# Patient Record
Sex: MALE | Race: WHITE | NOT HISPANIC OR LATINO | Employment: OTHER | URBAN - METROPOLITAN AREA
[De-identification: names, ages, dates, MRNs, and addresses within clinical notes are randomized per-mention and may not be internally consistent; named-entity substitution may affect disease eponyms.]

---

## 2017-06-26 RX ORDER — FLUTICASONE PROPIONATE 50 MCG
1 SPRAY, SUSPENSION (ML) NASAL 2 TIMES DAILY PRN
COMMUNITY
Start: 2016-12-28 | End: 2018-12-06 | Stop reason: SDUPTHER

## 2017-06-27 ENCOUNTER — OFFICE VISIT (OUTPATIENT)
Dept: FAMILY MEDICINE | Facility: CLINIC | Age: 57
End: 2017-06-27
Payer: OTHER GOVERNMENT

## 2017-06-27 VITALS
TEMPERATURE: 99 F | SYSTOLIC BLOOD PRESSURE: 128 MMHG | OXYGEN SATURATION: 99 % | WEIGHT: 174 LBS | HEART RATE: 54 BPM | BODY MASS INDEX: 29.71 KG/M2 | HEIGHT: 64 IN | DIASTOLIC BLOOD PRESSURE: 84 MMHG

## 2017-06-27 DIAGNOSIS — R05.9 COUGH: ICD-10-CM

## 2017-06-27 DIAGNOSIS — J06.9 UPPER RESPIRATORY TRACT INFECTION, UNSPECIFIED TYPE: Primary | ICD-10-CM

## 2017-06-27 PROBLEM — G47.33 OBSTRUCTIVE SLEEP APNEA SYNDROME: Status: ACTIVE | Noted: 2017-06-27

## 2017-06-27 PROBLEM — I10 BENIGN HYPERTENSION: Status: ACTIVE | Noted: 2017-06-27

## 2017-06-27 PROBLEM — R79.89 HIGH THYROID STIMULATING HORMONE (TSH) LEVEL: Status: ACTIVE | Noted: 2017-06-27

## 2017-06-27 PROBLEM — J30.89 PERENNIAL ALLERGIC RHINITIS WITH SEASONAL VARIATION: Status: ACTIVE | Noted: 2017-06-27

## 2017-06-27 PROBLEM — J30.2 PERENNIAL ALLERGIC RHINITIS WITH SEASONAL VARIATION: Status: ACTIVE | Noted: 2017-06-27

## 2017-06-27 PROBLEM — B35.1 ONYCHOMYCOSIS OF TOENAIL: Status: ACTIVE | Noted: 2017-06-27

## 2017-06-27 PROBLEM — Z13.89 ENCOUNTER FOR SCREENING FOR OTHER DISORDER: Status: ACTIVE | Noted: 2017-06-27

## 2017-06-27 PROBLEM — K21.9 GASTROESOPHAGEAL REFLUX DISEASE WITHOUT ESOPHAGITIS: Status: ACTIVE | Noted: 2017-06-27

## 2017-06-27 PROBLEM — Q39.4 TERMINAL ESOPHAGEAL WEB: Status: ACTIVE | Noted: 2017-06-27

## 2017-06-27 PROBLEM — E78.2 MULTIPLE-TYPE HYPERLIPIDEMIA: Status: ACTIVE | Noted: 2017-06-27

## 2017-06-27 PROCEDURE — 99204 OFFICE O/P NEW MOD 45 MIN: CPT | Mod: ,,, | Performed by: FAMILY MEDICINE

## 2017-06-27 RX ORDER — AZITHROMYCIN 250 MG/1
250 TABLET, FILM COATED ORAL DAILY
Qty: 6 TABLET | Refills: 0 | Status: SHIPPED | OUTPATIENT
Start: 2017-06-27 | End: 2017-07-02

## 2017-06-27 RX ORDER — AZITHROMYCIN 250 MG/1
500 TABLET, FILM COATED ORAL DAILY
Qty: 6 TABLET | Refills: 0 | Status: SHIPPED | OUTPATIENT
Start: 2017-06-27 | End: 2017-06-27 | Stop reason: SDUPTHER

## 2017-06-27 RX ORDER — AZITHROMYCIN 250 MG/1
250 TABLET, FILM COATED ORAL DAILY
Qty: 6 TABLET | Refills: 0 | Status: SHIPPED | OUTPATIENT
Start: 2017-06-27 | End: 2017-06-27 | Stop reason: SDUPTHER

## 2017-06-27 NOTE — PROGRESS NOTES
Subjective:       Patient ID:  Patrice Smith is a 57 y.o. male with multiple medical diagnoses as listed in the medical history and problem list that presents for Cough (gets worse as day goes on) and Nasal Congestion (chest congestion)  .      Chief Complaint: Cough (gets worse as day goes on) and Nasal Congestion (chest congestion)    Cough   This is a new problem. The current episode started 1 to 4 weeks ago. The problem has been gradually worsening. The problem occurs hourly. The cough is productive of sputum. Pertinent negatives include no chest pain, chills, ear pain, eye redness, fever, headaches, rash, sore throat, shortness of breath, weight loss or wheezing. Nothing aggravates the symptoms. He has tried nothing for the symptoms. There is no history of asthma, COPD or emphysema.     Review of Systems   Constitutional: Negative for activity change, appetite change, chills, fatigue, fever and weight loss.   HENT: Negative for congestion, ear discharge, ear pain, hearing loss and sore throat.    Eyes: Negative for discharge, redness and itching.   Respiratory: Negative for apnea, chest tightness, shortness of breath and wheezing.    Cardiovascular: Negative for chest pain, palpitations and leg swelling.   Gastrointestinal: Negative for abdominal distention and abdominal pain.   Endocrine: Negative for cold intolerance and polydipsia.   Genitourinary: Negative for dysuria.   Musculoskeletal: Negative for arthralgias and gait problem.   Skin: Negative for color change, pallor and rash.   Neurological: Negative for dizziness and headaches.   Hematological: Negative for adenopathy.   Psychiatric/Behavioral: Negative for agitation.       Past Medical History:   Diagnosis Date    Acid reflux     Hyperlipidemia     Hypertension      No past surgical history on file.  Family History   Problem Relation Age of Onset    Parkinsonism Mother     Hypertension Mother     Ulcers Father     Arthritis Father      Diabetes Father     Hypertension Brother     Cancer Brother     Diabetes Brother     Anesthesia problems Neg Hx     Clotting disorder Neg Hx      Social History     Social History    Marital status:      Spouse name: N/A    Number of children: N/A    Years of education: N/A     Occupational History    Warehouse Mgr.       Social History Main Topics    Smoking status: Never Smoker    Smokeless tobacco: Never Used    Alcohol use Yes      Comment: social    Drug use: No    Sexual activity: Not Asked     Other Topics Concern    None     Social History Narrative    None     Current Outpatient Prescriptions   Medication Sig Dispense Refill    CIALIS 20 mg Tab TAKE 1 TABLET DAILY AS NEEDED 30 tablet 2    esomeprazole (NEXIUM) 40 MG capsule Take 40 mg by mouth before breakfast.        fluticasone (FLONASE) 50 mcg/actuation nasal spray 1 spray by Nasal route 2 (two) times daily as needed.      LACTOBACILLUS ACIDOPHILUS (PROBIOTIC ORAL) Take 1 tablet by mouth every other day.      multivitamin capsule Take 1 capsule by mouth once daily.        valsartan (DIOVAN) 160 MG tablet Take 160 mg by mouth once daily.        azithromycin (Z-CONNER) 250 MG tablet Take 1 tablet (250 mg total) by mouth once daily. 6 tablet 0     No current facility-administered medications for this visit.      Review of patient's allergies indicates:  No Known Allergies  Objective:      Vitals:    06/27/17 0811   BP: 128/84   Pulse: (!) 54   Temp: 98.8 °F (37.1 °C)     Physical Exam   Constitutional: He appears well-developed and well-nourished.   HENT:   Head: Normocephalic.   Nose: Mucosal edema and rhinorrhea present.   Mouth/Throat: Posterior oropharyngeal erythema present.   Eyes: Pupils are equal, round, and reactive to light. Right eye exhibits no discharge. Left eye exhibits no discharge. No scleral icterus.   Neck: No thyromegaly present.   Cardiovascular: Normal rate and regular rhythm.  Exam reveals no gallop and no  friction rub.    No murmur heard.  Pulmonary/Chest: No respiratory distress. He has no wheezes. He has no rales. He exhibits no tenderness.   Abdominal: He exhibits no distension. There is no tenderness.   Musculoskeletal: He exhibits no tenderness.   Skin: No erythema.   Psychiatric: He has a normal mood and affect.       Assessment:       1. Upper respiratory tract infection, unspecified type    2. Cough        Plan:       Upper respiratory tract infection, unspecified type  -Cough  -     -     azithromycin (Z-CONNER) 250 MG tablet; Take 1 tablet (250 mg total) by mouth once daily.  Dispense: 6 tablet; Refill: 0                     X-Ray Chest PA And Lateral  The patient was advised to remain hydrated and take the following medications for symptomatic relief: 1) Altenative with Ibuprofen/Tylenol for myalgias/fever >100.4, 2) Tessalon Perles for throat irritation, 3) Flonase for rhinitis, and 4) Mucinex for chest congestion. Patient will return to clinic if symptoms worsen or persist for > 1 week.       Return in about 4 weeks (around 7/25/2017) for URI.      6/27/2017 Cynthia Erazo M.D.

## 2017-06-28 ENCOUNTER — TELEPHONE (OUTPATIENT)
Dept: FAMILY MEDICINE | Facility: CLINIC | Age: 57
End: 2017-06-28

## 2017-06-28 NOTE — TELEPHONE ENCOUNTER
----- Message from Cynthia Erazo MD sent at 6/28/2017  9:47 AM CDT -----  Please notify pt : x ray wnl.     Thanks . Cynthia ERAZO MD

## 2017-09-22 LAB
ALBUMIN SERPL-MCNC: 4.5 G/DL (ref 3.6–5.1)
ALBUMIN/GLOB SERPL: 2.3 (CALC) (ref 1–2.5)
ALP SERPL-CCNC: 40 U/L (ref 40–115)
ALT SERPL-CCNC: 38 U/L (ref 9–46)
AST SERPL-CCNC: 28 U/L (ref 10–35)
BILIRUB SERPL-MCNC: 0.8 MG/DL (ref 0.2–1.2)
BUN SERPL-MCNC: 22 MG/DL (ref 7–25)
BUN/CREAT SERPL: NORMAL (CALC) (ref 6–22)
CALCIUM SERPL-MCNC: 9.4 MG/DL (ref 8.6–10.3)
CHLORIDE SERPL-SCNC: 101 MMOL/L (ref 98–110)
CHOLEST SERPL-MCNC: 191 MG/DL
CHOLEST/HDLC SERPL: 3.6 (CALC)
CO2 SERPL-SCNC: 24 MMOL/L (ref 20–31)
CREAT SERPL-MCNC: 1.03 MG/DL (ref 0.7–1.33)
GFR SERPL CREATININE-BSD FRML MDRD: 80 ML/MIN/1.73M2
GLOBULIN SER CALC-MCNC: 2 G/DL (CALC) (ref 1.9–3.7)
GLUCOSE SERPL-MCNC: 99 MG/DL (ref 65–99)
HDLC SERPL-MCNC: 53 MG/DL
LDLC SERPL CALC-MCNC: 117 MG/DL (CALC)
NONHDLC SERPL-MCNC: 138 MG/DL (CALC)
POTASSIUM SERPL-SCNC: 4.5 MMOL/L (ref 3.5–5.3)
PROT SERPL-MCNC: 6.5 G/DL (ref 6.1–8.1)
PSA SERPL-MCNC: 1.2 NG/ML
SODIUM SERPL-SCNC: 136 MMOL/L (ref 135–146)
TRIGL SERPL-MCNC: 100 MG/DL
TSH SERPL-ACNC: 3.5 MIU/L (ref 0.4–4.5)

## 2017-09-25 ENCOUNTER — OFFICE VISIT (OUTPATIENT)
Dept: FAMILY MEDICINE | Facility: CLINIC | Age: 57
End: 2017-09-25
Payer: OTHER GOVERNMENT

## 2017-09-25 VITALS
OXYGEN SATURATION: 98 % | SYSTOLIC BLOOD PRESSURE: 144 MMHG | DIASTOLIC BLOOD PRESSURE: 80 MMHG | HEIGHT: 64 IN | BODY MASS INDEX: 29.42 KG/M2 | WEIGHT: 172.31 LBS | HEART RATE: 56 BPM | RESPIRATION RATE: 16 BRPM | TEMPERATURE: 98 F

## 2017-09-25 DIAGNOSIS — I10 BENIGN HYPERTENSION: Primary | ICD-10-CM

## 2017-09-25 DIAGNOSIS — K21.9 GASTROESOPHAGEAL REFLUX DISEASE WITHOUT ESOPHAGITIS: ICD-10-CM

## 2017-09-25 PROCEDURE — 99213 OFFICE O/P EST LOW 20 MIN: CPT | Mod: 25,,, | Performed by: NURSE PRACTITIONER

## 2017-09-25 PROCEDURE — 90686 IIV4 VACC NO PRSV 0.5 ML IM: CPT | Mod: ,,, | Performed by: NURSE PRACTITIONER

## 2017-09-25 PROCEDURE — 3008F BODY MASS INDEX DOCD: CPT | Mod: ,,, | Performed by: NURSE PRACTITIONER

## 2017-09-25 PROCEDURE — 90471 IMMUNIZATION ADMIN: CPT | Mod: ,,, | Performed by: NURSE PRACTITIONER

## 2017-09-25 PROCEDURE — 3077F SYST BP >= 140 MM HG: CPT | Mod: ,,, | Performed by: NURSE PRACTITIONER

## 2017-09-25 PROCEDURE — 3079F DIAST BP 80-89 MM HG: CPT | Mod: ,,, | Performed by: NURSE PRACTITIONER

## 2017-09-25 RX ORDER — VALSARTAN 160 MG/1
160 TABLET ORAL DAILY
Qty: 90 TABLET | Refills: 3 | Status: SHIPPED | OUTPATIENT
Start: 2017-09-25 | End: 2018-03-26 | Stop reason: SDUPTHER

## 2017-09-25 RX ORDER — ESOMEPRAZOLE MAGNESIUM 40 MG/1
40 CAPSULE, DELAYED RELEASE ORAL
Qty: 90 CAPSULE | Refills: 3 | Status: SHIPPED | OUTPATIENT
Start: 2017-09-25 | End: 2018-03-26 | Stop reason: CLARIF

## 2017-09-25 NOTE — PROGRESS NOTES
Subjective:       Patient ID: Patrice Smith is a 57 y.o. male.    Chief Complaint: Follow-up and Hypertension    The patient is a 57 year old male who presents for a Recheck of Hypertension. The onset of the hypertension has been gradual. The hypertension has been occurring in a continuous pattern for years. The course has been constant. There is no chest pain, dyspnea on exertion, edema, fatigue, headache, orthopnea, palpitations, paroxysmal nocturnal dyspnea or visual changes. Self-care includes home blood pressure monitoring and use of antihypertensives. There is no adequate exercise or tobacco use. There is a family history of hypertension (mom).    Additional reasons for visit:    The last clinic visit was 6 month(s) ago. Symptoms include pain and heartburn, while symptoms do not include regurgitation, acid taste in the mouth, sore throat, dysphagia or odynophagia. Symptoms are located in the epigastrium. There is no radiation. The patient describes the pain as burning. Onset was gradual. The patient describes this as mild and improving. Current treatment includes proton pump inhibitors (Nexium). By report there is good compliance with treatment. Risk factors do not include smoking.      Review of Systems   Constitutional: Negative for appetite change, chills, diaphoresis, fatigue, fever and unexpected weight change.   HENT: Negative for congestion, ear pain, hearing loss, sore throat and trouble swallowing.    Eyes: Negative for photophobia, pain and visual disturbance.   Respiratory: Negative for cough, chest tightness and shortness of breath.    Cardiovascular: Negative for chest pain, palpitations and leg swelling.   Gastrointestinal: Negative for abdominal pain, constipation, diarrhea, nausea and vomiting.   Endocrine: Negative for cold intolerance and heat intolerance.   Genitourinary: Negative for difficulty urinating, flank pain, penile pain and testicular pain.   Musculoskeletal: Negative for  arthralgias and myalgias.   Skin: Negative for rash.   Allergic/Immunologic: Negative for immunocompromised state.   Neurological: Negative for dizziness, weakness and headaches.   Hematological: Negative for adenopathy.   Psychiatric/Behavioral: Negative for agitation, confusion, self-injury and suicidal ideas.       Past Medical History:   Diagnosis Date    Acid reflux     Hyperlipidemia     Hypertension        History reviewed. No pertinent surgical history.    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       Social History Main Topics    Smoking status: Never Smoker    Smokeless tobacco: Never Used    Alcohol use Yes      Comment: social    Drug use: No    Sexual activity: Not Asked     Other Topics Concern    None     Social History Narrative    None       Current Outpatient Prescriptions   Medication Sig Dispense Refill    CIALIS 20 mg Tab TAKE 1 TABLET DAILY AS NEEDED 30 tablet 2    esomeprazole (NEXIUM) 40 MG capsule Take 1 capsule (40 mg total) by mouth before breakfast. 90 capsule 3    fluticasone (FLONASE) 50 mcg/actuation nasal spray 1 spray by Nasal route 2 (two) times daily as needed.      LACTOBACILLUS ACIDOPHILUS (PROBIOTIC ORAL) Take 1 tablet by mouth every other day.      multivitamin capsule Take 1 capsule by mouth once daily.        valsartan (DIOVAN) 160 MG tablet Take 1 tablet (160 mg total) by mouth once daily. 90 tablet 3     No current facility-administered medications for this visit.        Review of patient's allergies indicates:  No Known  Allergies  Objective:      Physical Exam   Constitutional: He is oriented to person, place, and time. He appears well-developed and well-nourished. He is cooperative. No distress.   HENT:   Head: Normocephalic and atraumatic.   Nose: Nose normal.   Mouth/Throat: Oropharynx is clear and moist.   Eyes: Conjunctivae, EOM and lids are normal. Pupils are equal, round, and reactive to light. Lids are everted and swept, no foreign bodies found. Right pupil is round and reactive. Left pupil is round and reactive.   Neck: Normal range of motion. Neck supple.   Cardiovascular: Normal rate, regular rhythm, normal heart sounds and intact distal pulses.    Pulmonary/Chest: Effort normal and breath sounds normal. No respiratory distress. He has no wheezes. He has no rales.   Abdominal: Soft. Bowel sounds are normal. He exhibits no mass. There is no tenderness. There is no rigidity and no guarding.   Musculoskeletal: Normal range of motion.   Lymphadenopathy:     He has no cervical adenopathy.     He has no axillary adenopathy.   Neurological: He is alert and oriented to person, place, and time.   Skin: Skin is warm and dry. Capillary refill takes less than 2 seconds.   Psychiatric: He has a normal mood and affect. His behavior is normal. Judgment and thought content normal.   Nursing note and vitals reviewed.      Assessment:       1. Benign hypertension    2. Gastroesophageal reflux disease without esophagitis        Plan:     Benign hypertension - stable.   Lifestyle changes: Reduce the amount of salt in your diet; Lose weight; Avoid drinking too much alcohol;Exercise at least 30 minutes per day most days of the week.  Continue current medications and home BP monitoring.   -     valsartan (DIOVAN) 160 MG tablet; Take 1 tablet (160 mg total) by mouth once daily.  Dispense: 90 tablet; Refill: 3    Gastroesophageal reflux disease without esophagitis - stable.   Weight loss - Losing weight may help to reduce acid reflux. Raise the  head of the bed six to eight inches.  Stay up-right 2-3 hours after meals. Avoid acid reflux inducing foods: caffeine, chocolate, alcohol, peppermint, fatty, spicy, fried foods. Avoid large and late meals.   -     esomeprazole (NEXIUM) 40 MG capsule; Take 1 capsule (40 mg total) by mouth before breakfast.  Dispense: 90 capsule; Refill: 3    Other orders  -     Influenza - Quadrivalent (3 years & older) (PF)    Reviewed and discussed lab results.  F/U in 6 months

## 2017-09-25 NOTE — PATIENT INSTRUCTIONS
Esophagitis     With esophagitis, the lining of the esophagus is inflamed.   Do you often have burning pain in your chest? You may have esophagitis. This is when the lining of the esophagus becomes red and swollen (inflamed). The esophagus is the tube that connects your throat to your stomach. This sheet tells you more about esophagitis. It also explains your treatment options.  Main types of esophagitis  Reflux esophagitis. This is the more common type. It is caused by GERD (gastroesophageal reflux disease). Stomach contents with stomach acid flow back up into the esophagus. This happens over and over. It leads to inflammation. Risk factors can include:  · Being overweight  · Asthma  · Smoking  · Pregnancy  · Frequent vomiting  · Certain medicines (such as aspirin and other anti-inflammatories)  · Hiatal hernia  Infectious esophagitis. This is caused by an infection. You are more at risk for this if you have a weakened immune system and poor nutrition. Antibiotic use can also be a factor. The infection is often due to the following:  · A type of fungus (typically candida)  · A virus, such as herpes simplex virus 1 (HSV-1) or cytomegalovirus (CMV)  Eosinophilic esophagitis. Foods or other things around you can give you an allergic reaction. This triggers an immune response and leads to esophagitis.  Pill-induced esophagitis. Certain types of medicines can cause inflammation and ulcers in the esophagus. These include doxycycline, aspirin, NSAIDs, alendronate, potassium, quinidine, iron.  Symptoms of esophagitis  The following symptoms can occur with esophagitis:  · Pain when swallowing, or trouble swallowing  · Pain behind your breastbone (heartburn)  · Acid regurgitation  · Chronic sore throat  · Gum Inflammation  · Cavities  · Bad breath  · Nausea  · Pain in your upper belly (abdomen)  · Bleeding (indicated by bright red vomit or black, tarry stool)  These symptoms occur more often with reflux  esophagitis:  · Coughing, wheezing, or asthma  · Hoarseness  Diagnosis of esophagitis  Your healthcare provider will ask about your health history and symptoms. Youll also be examined. Sometimes certain tests are needed. These may include:  · Upper endoscopy. A thin, flexible tube with a tiny light and camera is used. It is inserted through the mouth down into the esophagus. This lets the provider look for damage. A small sample of tissue (biopsy) may also be removed. The sample is sent to a lab for testing.  · Upper GI X-ray with barium. An X-ray is done after you drink a substance called barium. Barium may make problems in the esophagus easier to see on an x-ray.  · Esophageal pH. A soft, thin tube is passed into the esophagus through the nose or mouth for 24 hours. It measures the acid level in the esophagus.  · Esophageal manometry. A soft, thin tube is passed into the esophagus through the nose or mouth. It measures muscle contractions in the esophagus.  Treatment of esophagitis  Medicines. Different medicines can help treat esophagitis. The medicine used will depend on the type of esophagitis you have. Talk with your healthcare provider.  Lifestyle changes. Making the following changes can help reduce irritation and ease your symptoms:  · Avoid spicy foods (pepper, chili powder, bland). Also avoid hard foods (nuts, crackers, raw vegetables) and acidic foods and drinks (tomatoes, citrus fruits and juices). Other problem foods include chocolate, peppermint, nutmeg, and foods high in fat.  · Until you can swallow without pain, follow a combined liquid and soft diet. Try foods such as cooked cereals, mashed potatoes, and soups.  · Take small bites and chew your food thoroughly.  · Avoid large meals and heavy evening meals. Don't lie down within 2 to 3 hours of eating.  · Get to or stay at a healthy weight.  · Avoid alcohol, caffeine, and smoking or tobacco products.  · Brush and floss your teeth  · Raise your  upper body by 4 to 6 inches when lying in bed. This can be done using a foam wedge. Or put blocks under the legs at the head of your bed.  Surgery. This may be needed for severe reflux esophagitis. Other noninvasive procedures to treat GERD and esophagitis are being studied. Your provider can tell you more.  Why treatment Is important  Without treatment, esophagitis can get worse. This is especially true with severe reflux esophagitis. For instance, continued symptoms can cause scarring of the esophagus. Over time, this can cause a narrowing the esophagus (stricture). This can make it hard to pass food down to the stomach. As symptoms go on they can also cause changes in the lining of the esophagus. These changes can put you at a slightly higher risk of cancer of the esophagus.   Date Last Reviewed: 7/1/2016  © 1012-5814 [employer]. [address] [place] [address], [place] [address]. All rights reserved. This information is not intended as a substitute for professional medical care. Always follow your healthcare professional's instructions.

## 2017-10-02 PROBLEM — Z13.89 ENCOUNTER FOR SCREENING FOR OTHER DISORDER: Status: RESOLVED | Noted: 2017-06-27 | Resolved: 2017-10-02

## 2017-10-09 ENCOUNTER — OFFICE VISIT (OUTPATIENT)
Dept: UROLOGY | Facility: CLINIC | Age: 57
End: 2017-10-09
Payer: OTHER GOVERNMENT

## 2017-10-09 VITALS
SYSTOLIC BLOOD PRESSURE: 152 MMHG | HEIGHT: 64 IN | WEIGHT: 176.38 LBS | DIASTOLIC BLOOD PRESSURE: 89 MMHG | BODY MASS INDEX: 30.11 KG/M2 | HEART RATE: 52 BPM

## 2017-10-09 DIAGNOSIS — Z80.42 FAMILY HISTORY OF PROSTATE CANCER: ICD-10-CM

## 2017-10-09 DIAGNOSIS — N52.8 OTHER MALE ERECTILE DYSFUNCTION: ICD-10-CM

## 2017-10-09 PROCEDURE — 99999 PR PBB SHADOW E&M-EST. PATIENT-LVL III: CPT | Mod: PBBFAC,,, | Performed by: UROLOGY

## 2017-10-09 PROCEDURE — 81002 URINALYSIS NONAUTO W/O SCOPE: CPT | Mod: PBBFAC | Performed by: UROLOGY

## 2017-10-09 PROCEDURE — 99213 OFFICE O/P EST LOW 20 MIN: CPT | Mod: S$PBB,,, | Performed by: UROLOGY

## 2017-10-09 PROCEDURE — 99213 OFFICE O/P EST LOW 20 MIN: CPT | Mod: PBBFAC | Performed by: UROLOGY

## 2017-10-09 RX ORDER — TADALAFIL 20 MG/1
20 TABLET ORAL
Qty: 30 TABLET | Refills: 3 | Status: SHIPPED | OUTPATIENT
Start: 2017-10-09 | End: 2018-11-19 | Stop reason: SDUPTHER

## 2017-10-09 NOTE — PROGRESS NOTES
CHIEF COMPLAINT:    Mr. Smith is a 57 y.o. male presenting for a follow up on family history of prostate cancer (brother) and erectile dysfunction    PRESENTING ILLNESS:    Patrice Smith is a 57 y.o. male who returns for follow up.  He states that he did not take the Avodart, recalls that there was a problem obtaining it through .  He found that the nocturia resolved once he was diagnosed with obstructed sleep apnea and used the CPAP.  He feels much better since he is able to sleep.  With regards to his erections, he used 1/2 a tab Cialis 20 mg when he needs to.      REVIEW OF SYSTEMS:    Review of Systems   Constitutional: Negative.    HENT: Negative.    Eyes: Negative.    Respiratory:        BRICE   Cardiovascular: Negative.    Gastrointestinal: Negative.    Genitourinary: Negative.    Musculoskeletal: Negative.    Skin: Negative.    Neurological: Negative.    Endo/Heme/Allergies: Negative.    Psychiatric/Behavioral: Negative.      PATIENT HISTORY:    Past Medical History:   Diagnosis Date    Acid reflux     Hyperlipidemia     Hypertension        No past surgical history on file.    Family History   Problem Relation Age of Onset    Parkinsonism Mother     Hypertension Mother     Ulcers Father     Arthritis Father     Diabetes Father     Hypertension Brother     Cancer Brother     Diabetes Brother      Social History    Marital status:      Occupational History    Warehouse Mgr.       Social History Main Topics    Smoking status: Never Smoker    Smokeless tobacco: Never Used    Alcohol use Yes      Comment: social    Drug use: No    Sexual activity: Not on file       Allergies:  Review of patient's allergies indicates no known allergies.    Medications:  Outpatient Encounter Prescriptions as of 10/9/2017   Medication Sig Dispense Refill    esomeprazole (NEXIUM) 40 MG capsule Take 1 capsule (40 mg total) by mouth before breakfast. 90 capsule 3    fluticasone (FLONASE) 50  mcg/actuation nasal spray 1 spray by Nasal route 2 (two) times daily as needed.      LACTOBACILLUS ACIDOPHILUS (PROBIOTIC ORAL) Take 1 tablet by mouth every other day.      multivitamin capsule Take 1 capsule by mouth once daily.        tadalafil (CIALIS) 20 MG Tab Take 1 tablet (20 mg total) by mouth twice a week. As needed 30 tablet 3    valsartan (DIOVAN) 160 MG tablet Take 1 tablet (160 mg total) by mouth once daily. 90 tablet 3    [DISCONTINUED] CIALIS 20 mg Tab TAKE 1 TABLET DAILY AS NEEDED 30 tablet 2     No facility-administered encounter medications on file as of 10/9/2017.          PHYSICAL EXAMINATION:    The patient generally appears in good health, is appropriately interactive, and is in no apparent distress.    Skin: No lesions.    Mental: Cooperative with normal affect.    Neuro: Grossly intact.    HEENT: Normal. No evidence of lymphadenopathy.    Chest:  normal inspiratory effort.    Abdomen: Soft, non-tender. No masses or organomegaly. Bladder is not palpable. No evidence of flank discomfort. No evidence of inguinal hernia.    Extremities: No clubbing, cyanosis, or edema    Scrotum showed no rashes or lesions. Testicles showed no masses or tenderness.  Epididymis showed no masses or tenderness.  Penis was circumcised. No meatal stenosis. No penile discharge.  No inguinal hernias.  No inguinal lymphadenopathy.    CRISTÓBAL:  30 g prostate, no nodularity.  Normal rectal tone, no rectal masses.    LABS:    Lab Results   Component Value Date    BUN 22 09/21/2017    CREATININE 1.03 09/21/2017     Lab Results   Component Value Date    PSA 1.6 02/06/2014    PSA 1.72 11/19/2012    PSA 1.4 01/06/2011   PSA 1.2 ng/ml on 9/21/2017 done at [facility], can be found under labs.     UA 1.010, pH 7, otherwise, negative    IMPRESSION:    Encounter Diagnoses   Name Primary?    Other male erectile dysfunction     Family history of prostate cancer        PLAN:    1.  Refilled the Cialis  2.  His exam and recent labs are  stable.   3.  Follow up in 1 year.

## 2018-03-22 ENCOUNTER — PATIENT MESSAGE (OUTPATIENT)
Dept: FAMILY MEDICINE | Facility: CLINIC | Age: 58
End: 2018-03-22

## 2018-03-22 ENCOUNTER — TELEPHONE (OUTPATIENT)
Dept: FAMILY MEDICINE | Facility: CLINIC | Age: 58
End: 2018-03-22

## 2018-03-22 DIAGNOSIS — E78.2 MULTIPLE-TYPE HYPERLIPIDEMIA: ICD-10-CM

## 2018-03-22 DIAGNOSIS — I10 BENIGN HYPERTENSION: Primary | ICD-10-CM

## 2018-03-22 DIAGNOSIS — E66.9 CLASS 1 OBESITY WITH BODY MASS INDEX (BMI) OF 30.0 TO 30.9 IN ADULT, UNSPECIFIED OBESITY TYPE, UNSPECIFIED WHETHER SERIOUS COMORBIDITY PRESENT: ICD-10-CM

## 2018-03-22 NOTE — TELEPHONE ENCOUNTER
Pt. called & said he has an appt. w/you on 3/26/18. He wants to know if you want to order labs before his visit. He would like you to send them to [facility].

## 2018-03-24 LAB
ALBUMIN SERPL-MCNC: 4.7 G/DL (ref 3.6–5.1)
ALBUMIN/CREAT UR: 3 MCG/MG CREAT
ALBUMIN/GLOB SERPL: 2 (CALC) (ref 1–2.5)
ALP SERPL-CCNC: 52 U/L (ref 40–115)
ALT SERPL-CCNC: 40 U/L (ref 9–46)
APPEARANCE UR: CLEAR
AST SERPL-CCNC: 27 U/L (ref 10–35)
BASOPHILS # BLD AUTO: 18 CELLS/UL (ref 0–200)
BASOPHILS NFR BLD AUTO: 0.4 %
BILIRUB SERPL-MCNC: 0.8 MG/DL (ref 0.2–1.2)
BILIRUB UR QL STRIP: NEGATIVE
BUN SERPL-MCNC: 16 MG/DL (ref 7–25)
BUN/CREAT SERPL: ABNORMAL (CALC) (ref 6–22)
CALCIUM SERPL-MCNC: 9.4 MG/DL (ref 8.6–10.3)
CHLORIDE SERPL-SCNC: 104 MMOL/L (ref 98–110)
CHOLEST SERPL-MCNC: 230 MG/DL
CHOLEST/HDLC SERPL: 4.1 (CALC)
CO2 SERPL-SCNC: 27 MMOL/L (ref 20–31)
COLOR UR: YELLOW
CREAT SERPL-MCNC: 1.07 MG/DL (ref 0.7–1.33)
CREAT UR-MCNC: 113 MG/DL (ref 20–370)
EOSINOPHIL # BLD AUTO: 131 CELLS/UL (ref 15–500)
EOSINOPHIL NFR BLD AUTO: 2.9 %
ERYTHROCYTE [DISTWIDTH] IN BLOOD BY AUTOMATED COUNT: 13.2 % (ref 11–15)
GFR SERPL CREATININE-BSD FRML MDRD: 76 ML/MIN/1.73M2
GLOBULIN SER CALC-MCNC: 2.4 G/DL (CALC) (ref 1.9–3.7)
GLUCOSE SERPL-MCNC: 106 MG/DL (ref 65–99)
GLUCOSE UR QL STRIP: NEGATIVE
HCT VFR BLD AUTO: 47.8 % (ref 38.5–50)
HDLC SERPL-MCNC: 56 MG/DL
HGB BLD-MCNC: 16.5 G/DL (ref 13.2–17.1)
HGB UR QL STRIP: NEGATIVE
KETONES UR QL STRIP: NEGATIVE
LDLC SERPL CALC-MCNC: 154 MG/DL (CALC)
LEUKOCYTE ESTERASE UR QL STRIP: NEGATIVE
LYMPHOCYTES # BLD AUTO: 1859 CELLS/UL (ref 850–3900)
LYMPHOCYTES NFR BLD AUTO: 41.3 %
MCH RBC QN AUTO: 33.8 PG (ref 27–33)
MCHC RBC AUTO-ENTMCNC: 34.5 G/DL (ref 32–36)
MCV RBC AUTO: 98 FL (ref 80–100)
MICROALBUMIN UR-MCNC: 0.3 MG/DL
MONOCYTES # BLD AUTO: 414 CELLS/UL (ref 200–950)
MONOCYTES NFR BLD AUTO: 9.2 %
NEUTROPHILS # BLD AUTO: 2079 CELLS/UL (ref 1500–7800)
NEUTROPHILS NFR BLD AUTO: 46.2 %
NITRITE UR QL STRIP: NEGATIVE
NONHDLC SERPL-MCNC: 174 MG/DL (CALC)
PH UR STRIP: 5.5 [PH] (ref 5–8)
PLATELET # BLD AUTO: 212 THOUSAND/UL (ref 140–400)
PMV BLD REES-ECKER: 11.4 FL (ref 7.5–12.5)
POTASSIUM SERPL-SCNC: 4.5 MMOL/L (ref 3.5–5.3)
PROT SERPL-MCNC: 7.1 G/DL (ref 6.1–8.1)
PROT UR QL STRIP: NEGATIVE
RBC # BLD AUTO: 4.88 MILLION/UL (ref 4.2–5.8)
SODIUM SERPL-SCNC: 138 MMOL/L (ref 135–146)
SP GR UR STRIP: 1.01 (ref 1–1.03)
TRIGL SERPL-MCNC: 96 MG/DL
TSH SERPL-ACNC: 3.8 MIU/L (ref 0.4–4.5)
WBC # BLD AUTO: 4.5 THOUSAND/UL (ref 3.8–10.8)

## 2018-03-26 ENCOUNTER — OFFICE VISIT (OUTPATIENT)
Dept: FAMILY MEDICINE | Facility: CLINIC | Age: 58
End: 2018-03-26
Payer: OTHER GOVERNMENT

## 2018-03-26 VITALS
HEART RATE: 57 BPM | DIASTOLIC BLOOD PRESSURE: 78 MMHG | BODY MASS INDEX: 30.22 KG/M2 | WEIGHT: 177 LBS | SYSTOLIC BLOOD PRESSURE: 126 MMHG | OXYGEN SATURATION: 99 % | HEIGHT: 64 IN

## 2018-03-26 DIAGNOSIS — I10 BENIGN HYPERTENSION: Primary | ICD-10-CM

## 2018-03-26 DIAGNOSIS — E78.2 MULTIPLE-TYPE HYPERLIPIDEMIA: ICD-10-CM

## 2018-03-26 DIAGNOSIS — K21.9 GASTROESOPHAGEAL REFLUX DISEASE WITHOUT ESOPHAGITIS: ICD-10-CM

## 2018-03-26 DIAGNOSIS — Z12.5 PROSTATE CANCER SCREENING: ICD-10-CM

## 2018-03-26 DIAGNOSIS — E66.09 CLASS 1 OBESITY DUE TO EXCESS CALORIES WITHOUT SERIOUS COMORBIDITY WITH BODY MASS INDEX (BMI) OF 30.0 TO 30.9 IN ADULT: ICD-10-CM

## 2018-03-26 PROCEDURE — 99214 OFFICE O/P EST MOD 30 MIN: CPT | Mod: ,,, | Performed by: NURSE PRACTITIONER

## 2018-03-26 RX ORDER — VALSARTAN 160 MG/1
160 TABLET ORAL DAILY
Qty: 90 TABLET | Refills: 2 | Status: SHIPPED | OUTPATIENT
Start: 2018-03-26 | End: 2018-07-23 | Stop reason: ALTCHOICE

## 2018-03-26 RX ORDER — ROSUVASTATIN CALCIUM 10 MG/1
10 TABLET, COATED ORAL DAILY
Qty: 90 TABLET | Refills: 2 | Status: SHIPPED | OUTPATIENT
Start: 2018-03-26 | End: 2018-11-26 | Stop reason: SDUPTHER

## 2018-03-26 RX ORDER — ESOMEPRAZOLE MAGNESIUM 40 MG/1
40 CAPSULE, DELAYED RELEASE ORAL
Qty: 90 CAPSULE | Refills: 2 | Status: SHIPPED | OUTPATIENT
Start: 2018-03-26 | End: 2018-11-27 | Stop reason: SDUPTHER

## 2018-03-26 NOTE — PATIENT INSTRUCTIONS
Medicines for Acid Reflux  Your healthcare provider has told you that you have acid reflux. This condition causes stomach acid to wash up into your throat. For most people, acid reflux is troubling but not dangerous. But left untreated, acid reflux sometimes damages the esophagus. Medicines can help control acid reflux and limit your risk of future problems.  Medicines for acid reflux  Your healthcare provider may prescribe medicine to help treat your acid reflux. Medicine will be based on your symptoms and any test results. Your provider will explain how to take your medicine. You will also be told about possible side effects.  Reducing stomach acid  Your provider may suggest antacids that you can buy over the counter. Antacids can give fast relief. Or you may be told to take a type of medicine called H2 blockers. These are available over the counter and by prescription (for higher doses).  Blocking stomach acid  In more severe cases, your healthcare provider may suggest stronger medicines such as proton pump inhibitors (PPIs). These keep the stomach from making acid. They are often prescribed for long-term use.  Other medicines  In some cases medicines to reduce or block stomach acid may not work. Then you may be switched to another type of medicine that helps your stomach empty better.     Date Last Reviewed: 10/1/2016  © 1521-1271 [employer]. [address] [place] [address], [place] [address]. All rights reserved. This information is not intended as a substitute for professional medical care. Always follow your healthcare professional's instructions.        Taking Your Blood Pressure  Blood pressure is the force of blood against the artery wall as it moves from the heart through the blood vessels. You can take your own blood pressure reading using a digital monitor. Take your readings the same each time, using the same arm. Take readings as often as your healthcare provider instructs.  About blood  pressure monitors  Blood pressure monitors are designed for certain ages and cases. You can find monitors for older adults, for pregnant women, and for children. Make sure the one you choose is the right one for your age and situation.  The American Heart Association recommends an automatic cuff monitor that fits on your upper arm (bicep). The cuff should fit your arm size. A cuff thats too large or too small will not give an accurate reading. Measure around your upper arm to find your size.  Monitors that attach to your finger or wrist are not as accurate as monitors for your upper arm.  Ask your healthcare provider for help in choosing a monitor. Bring your monitor to your next provider visit if you need help in using it the correct way.  The steps below are general instructions for using an automatic digital monitor.  Step 1. Relax    · Take your blood pressure at the same time every day, such as in the morning or evening, or at the time your healthcare provider recommends.  · Wait at least a half-hour after smoking, eating, or exercising. Don't drink coffee, tea, soda, or other caffeinated beverages before checking your blood pressure.  · Sit comfortably at a table with both feet on the floor. Do not cross your legs or feet. Place the monitor near you.  · Rest for a few minutes before you begin.  Step 2. Wrap the cuff    · Place your arm on the table, palm up. Your arm should be at the level of your heart. Wrap the cuff around your upper arm, just above your elbow. Its best done on bare skin, not over clothing. Most cuffs will indicate where the brachial artery (the blood vessel in the middle of the arm at the inner side of the elbow) should line up with the cuff. Look in your monitor's instruction booklet for an illustration. You can also bring your cuff to your healthcare provider and have them show you how to correctly place the cuff.  Step 3. Inflate the cuff    · Push the button that starts the  "pump.  · The cuff will tighten, then loosen.  · The numbers will change. When they stop changing, your blood pressure reading will appear.  · Take 2 or 3 readings one minute apart.  Step 4. Write down the results of each reading    · Write down your blood pressure numbers for each reading. Note the date and time. Keep your results in one place, such as a notebook. Even if your monitor has a built-in memory, keep a hard copy of the readings.  · Remove the cuff from your arm. Turn off the machine.  · Bring your blood pressure records with your healthcare providers at each visit.  · If you start a new blood pressure medicine, note the day you started the new medicine. Also note the day if you change the dose of your medicine. This information goes on your blood pressure recording sheet. This will help your healthcare provider monitor how well the medicine changes are working.  · Ask your healthcare provider what numbers should prompt you to call him or her. Also ask what numbers should prompt you to get help right away.  Date Last Reviewed: 11/1/2016 © 2000-2017 [employer]. [address] [place] [address]. All rights reserved. This information is not intended as a substitute for professional medical care. Always follow your healthcare professional's instructions.        Understanding Body Mass Index (BMI)  Body mass index (BMI) is a method of screening for a weight category using the ratio of your height to your weight. The BMI is a measure of overweight that is corrected for height. Knowing your BMI is a way to tell if you are at a healthy weight. The higher your BMI, the greater your risk for weight-related health problems.  What BMI means  · BMI below 18.5: Underweight  · BMI 18.5 to 24.9: Healthy weight or "ideal body weight"   · BMI 25 to 29.9: Overweight  · BMI 30 and over: Obese  · BMI 40 and over: Severe obesity   Online BMI Calculators  Find your BMI with an online BMI calculator " tool, such as these from the CDC:  · BMI calculator for adults  · BMI calculator for children and teens   Using the BMI chart  To figure out your BMI, find your height and weight (or the numbers closest to them) on the table below. Follow each column of numbers to where your height and weight meet on the table. That is your BMI.    Date Last Reviewed: 7/1/2016  © 3481-4501 [employer]. [address] [place] [address], [place] [address]. All rights reserved. This information is not intended as a substitute for professional medical care. Always follow your healthcare professional's instructions.

## 2018-03-26 NOTE — PROGRESS NOTES
Subjective:       Patient ID: Patrice Smith is a 58 y.o. male.    Chief Complaint: Hypertension; Gastroesophageal Reflux; and Follow-up (labs)    Hypertension   This is a chronic problem. The current episode started more than 1 year ago. The problem has been waxing and waning since onset. The problem is controlled. Pertinent negatives include no anxiety, blurred vision, chest pain, headaches, malaise/fatigue, neck pain, orthopnea, palpitations, peripheral edema, PND, shortness of breath or sweats. There are no associated agents to hypertension. Risk factors for coronary artery disease include dyslipidemia, male gender, obesity, sedentary lifestyle and stress. Past treatments include angiotensin blockers. The current treatment provides significant improvement. Compliance problems include exercise and diet.  There is no history of angina, kidney disease, CAD/MI, CVA, heart failure, left ventricular hypertrophy, PVD or retinopathy. There is no history of chronic renal disease or a hypertension causing med.   Gastroesophageal Reflux   He complains of heartburn. He reports no abdominal pain, no belching, no chest pain, no choking, no coughing, no dysphagia, no early satiety, no globus sensation, no hoarse voice, no nausea, no sore throat, no stridor, no tooth decay, no water brash or no wheezing. This is a chronic problem. The current episode started more than 1 year ago. The problem occurs occasionally. The problem has been waxing and waning. The heartburn duration is less than a minute. The heartburn is located in the substernum. The heartburn is of moderate intensity. The heartburn does not wake him from sleep. The heartburn does not limit his activity. The heartburn changes with position. The symptoms are aggravated by certain foods and exertion. Pertinent negatives include no anemia, fatigue, melena, muscle weakness, orthopnea or weight loss. Risk factors include obesity and lack of exercise. He has tried a PPI  for the symptoms. The treatment provided significant relief. Past invasive treatments do not include gastroplasty.   Hyperlipidemia   This is a recurrent problem. This is a new diagnosis. The problem is uncontrolled. Recent lipid tests were reviewed and are high. Exacerbating diseases include obesity. He has no history of chronic renal disease, diabetes, hypothyroidism, liver disease or nephrotic syndrome. Factors aggravating his hyperlipidemia include fatty foods. Pertinent negatives include no chest pain, focal sensory loss, focal weakness, leg pain, myalgias or shortness of breath. Current antihyperlipidemic treatment includes diet change. The current treatment provides no improvement of lipids. Compliance problems include adherence to diet and adherence to exercise.  Risk factors for coronary artery disease include hypertension, post-menopausal, stress, male sex, dyslipidemia and obesity.     Review of Systems   Constitutional: Negative for appetite change, chills, diaphoresis, fatigue, fever, malaise/fatigue, unexpected weight change and weight loss.        Obesity   HENT: Negative for congestion, ear discharge, ear pain, hearing loss, hoarse voice, sore throat, trouble swallowing and voice change.    Eyes: Negative for blurred vision, photophobia, pain and visual disturbance.   Respiratory: Negative for cough, choking, chest tightness, shortness of breath and wheezing.    Cardiovascular: Negative for chest pain, palpitations, orthopnea, leg swelling and PND.        Hypertension, hyperlipidemia   Gastrointestinal: Positive for heartburn. Negative for abdominal pain, constipation, diarrhea, dysphagia, melena, nausea and vomiting.          GERD   Endocrine: Negative for cold intolerance and heat intolerance.   Genitourinary: Negative for difficulty urinating, dysuria and flank pain.   Musculoskeletal: Negative for arthralgias, gait problem, myalgias, muscle weakness and neck pain.   Skin: Negative for rash.    Allergic/Immunologic: Negative for immunocompromised state.   Neurological: Negative for dizziness, focal weakness, weakness and headaches.   Hematological: Negative for adenopathy.   Psychiatric/Behavioral: Negative for agitation, confusion, self-injury and suicidal ideas.       Past Medical History:   Diagnosis Date    Acid reflux     Hyperlipidemia     Hypertension     History reviewed. No pertinent surgical history.    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       Social History Main Topics    Smoking status: Never Smoker    Smokeless tobacco: Never Used    Alcohol use Yes      Comment: social    Drug use: No    Sexual activity: Not Asked     Other Topics Concern    None     Social History Narrative    None       Current Outpatient Prescriptions   Medication Sig Dispense Refill    fluticasone (FLONASE) 50 mcg/actuation nasal spray 1 spray by Nasal route 2 (two) times daily as needed.      LACTOBACILLUS ACIDOPHILUS (PROBIOTIC ORAL) Take 1 tablet by mouth every other day.      multivitamin capsule Take 1 capsule by mouth once daily.        tadalafil (CIALIS) 20 MG Tab Take 1 tablet (20 mg total) by mouth twice a week. As needed 30 tablet 3    valsartan (DIOVAN) 160 MG tablet Take 1 tablet (160 mg total) by mouth once daily. 90 tablet 2    esomeprazole (NEXIUM) 40 MG capsule Take 1 capsule (40 mg total) by mouth before breakfast. 90 capsule 2    rosuvastatin (CRESTOR) 10 MG tablet Take 1 tablet (10 mg total) by mouth once daily. 90 tablet 2     No current  "facility-administered medications for this visit.        Review of patient's allergies indicates:  No Known Allergies  Objective:    HPI     Follow-up    Additional comments: labs       Last edited by Katt Smith LPN on 3/26/2018  8:01 AM. (History)      Blood pressure 126/78, pulse (!) 57, height 5' 4" (1.626 m), weight 80.3 kg (177 lb), SpO2 99 %. Body mass index is 30.38 kg/m².   Physical Exam   Constitutional: He is oriented to person, place, and time. He appears well-developed. No distress.   Obesity   HENT:   Head: Normocephalic and atraumatic.   Right Ear: Tympanic membrane and external ear normal.   Left Ear: Tympanic membrane and external ear normal.   Nose: Nose normal.   Mouth/Throat: Uvula is midline, oropharynx is clear and moist and mucous membranes are normal.   Eyes: Conjunctivae, EOM and lids are normal. Pupils are equal, round, and reactive to light.   Neck: Normal range of motion. Neck supple.   Cardiovascular: Normal rate, regular rhythm, S1 normal, S2 normal, normal heart sounds, intact distal pulses and normal pulses.    No murmur heard.  Pulmonary/Chest: Effort normal and breath sounds normal. No respiratory distress. He has no wheezes.   Abdominal: Soft. Bowel sounds are normal. There is no tenderness.   Musculoskeletal: Normal range of motion.   Lymphadenopathy:     He has no cervical adenopathy.   Neurological: He is alert and oriented to person, place, and time.   Skin: Skin is warm and dry. No rash noted.   Psychiatric: He has a normal mood and affect. His speech is normal and behavior is normal. Judgment and thought content normal.   Nursing note and vitals reviewed.          Assessment:       1. Benign hypertension    2. Multiple-type hyperlipidemia    3. Gastroesophageal reflux disease without esophagitis    4. Class 1 obesity due to excess calories without serious comorbidity with body mass index (BMI) of 30.0 to 30.9 in adult    5. Prostate cancer screening        Plan:     "   Patrice was seen today for hypertension, gastroesophageal reflux and follow-up.    Diagnoses and all orders for this visit:    Benign hypertension  -     valsartan (DIOVAN) 160 MG tablet; Take 1 tablet (160 mg total) by mouth once daily.  -     Microalbumin/creatinine urine ratio; Future  -     Urinalysis; Future  -     Microalbumin/creatinine urine ratio  -     Urinalysis  -Lifestyle changes: Reduce the amount of salt in your diet; Lose weight; Avoid drinking too much alcohol; Exercise at least 30 minutes per day most days of the week.  Continue current medications and home BP monitoring.     Multiple-type hyperlipidemia  -     Lipid panel; Future  -     Lipid panel  -     rosuvastatin (CRESTOR) 10 MG tablet; Take 1 tablet (10 mg total) by mouth once daily.  -Limit red meat, butter, fried foods, cheese, and other foods that have a lot of saturated fat. Consume more: lean meats, fish, fruits, vegetables, whole grains, beans, lentils, and nuts.  Weight loss, and 30-45 min of cardiovascular exercise daily.     Gastroesophageal reflux disease without esophagitis  -     esomeprazole (NEXIUM) 40 MG capsule; Take 1 capsule (40 mg total) by mouth before breakfast.  -     Comprehensive metabolic panel; Future  -     Comprehensive metabolic panel    Class 1 obesity due to excess calories without serious comorbidity with body mass index (BMI) of 30.0 to 30.9 in adult    Prostate cancer screening  -     PSA, Screening; Future  -     PSA, Screening       Labs reviewed with patient.  Crestor started today for hyperlipidemia.  Will recheck in 6 months.  Follow up at that time.  Labs ordered for 6 months.  Obtain labs for next office visit.

## 2018-07-19 ENCOUNTER — TELEPHONE (OUTPATIENT)
Dept: FAMILY MEDICINE | Facility: CLINIC | Age: 58
End: 2018-07-19

## 2018-07-19 DIAGNOSIS — I10 BENIGN HYPERTENSION: Primary | ICD-10-CM

## 2018-07-23 RX ORDER — IRBESARTAN 150 MG/1
150 TABLET ORAL NIGHTLY
Qty: 30 TABLET | Refills: 2 | Status: SHIPPED | OUTPATIENT
Start: 2018-07-23 | End: 2018-10-19 | Stop reason: SDUPTHER

## 2018-07-23 NOTE — TELEPHONE ENCOUNTER
Irbesartan sent to the pharmacy.  Patient should stop taking valsartan and start taking irbesartan daily.  Monitor blood pressure daily and follow up within 2 months.  Medication sent to local pharmacy.

## 2018-09-24 ENCOUNTER — PATIENT MESSAGE (OUTPATIENT)
Dept: FAMILY MEDICINE | Facility: CLINIC | Age: 58
End: 2018-09-24

## 2018-09-29 LAB
ALBUMIN SERPL-MCNC: 4.7 G/DL (ref 3.6–5.1)
ALBUMIN/CREAT UR: NORMAL MCG/MG CREAT
ALBUMIN/GLOB SERPL: 2.4 (CALC) (ref 1–2.5)
ALP SERPL-CCNC: 46 U/L (ref 40–115)
ALT SERPL-CCNC: 36 U/L (ref 9–46)
APPEARANCE UR: CLEAR
AST SERPL-CCNC: 22 U/L (ref 10–35)
BILIRUB SERPL-MCNC: 0.7 MG/DL (ref 0.2–1.2)
BILIRUB UR QL STRIP: NEGATIVE
BUN SERPL-MCNC: 21 MG/DL (ref 7–25)
BUN/CREAT SERPL: NORMAL (CALC) (ref 6–22)
CALCIUM SERPL-MCNC: 9.5 MG/DL (ref 8.6–10.3)
CHLORIDE SERPL-SCNC: 100 MMOL/L (ref 98–110)
CHOLEST SERPL-MCNC: 113 MG/DL
CHOLEST/HDLC SERPL: 2 (CALC)
CO2 SERPL-SCNC: 24 MMOL/L (ref 20–32)
COLOR UR: YELLOW
CREAT SERPL-MCNC: 1.05 MG/DL (ref 0.7–1.33)
CREAT UR-MCNC: 71 MG/DL (ref 20–320)
GFR SERPL CREATININE-BSD FRML MDRD: 78 ML/MIN/1.73M2
GLOBULIN SER CALC-MCNC: 2 G/DL (CALC) (ref 1.9–3.7)
GLUCOSE SERPL-MCNC: 92 MG/DL (ref 65–99)
GLUCOSE UR QL STRIP: NEGATIVE
HDLC SERPL-MCNC: 56 MG/DL
HGB UR QL STRIP: NEGATIVE
KETONES UR QL STRIP: NEGATIVE
LDLC SERPL CALC-MCNC: 40 MG/DL (CALC)
LEUKOCYTE ESTERASE UR QL STRIP: NEGATIVE
MICROALBUMIN UR-MCNC: <0.2 MG/DL
NITRITE UR QL STRIP: NEGATIVE
NONHDLC SERPL-MCNC: 57 MG/DL (CALC)
PH UR STRIP: 6 [PH] (ref 5–8)
POTASSIUM SERPL-SCNC: 4.4 MMOL/L (ref 3.5–5.3)
PROT SERPL-MCNC: 6.7 G/DL (ref 6.1–8.1)
PROT UR QL STRIP: NEGATIVE
PSA SERPL-MCNC: 1.2 NG/ML
SODIUM SERPL-SCNC: 136 MMOL/L (ref 135–146)
SP GR UR STRIP: 1.01 (ref 1–1.03)
TRIGL SERPL-MCNC: 89 MG/DL

## 2018-10-05 ENCOUNTER — OFFICE VISIT (OUTPATIENT)
Dept: FAMILY MEDICINE | Facility: CLINIC | Age: 58
End: 2018-10-05
Payer: OTHER GOVERNMENT

## 2018-10-05 ENCOUNTER — TELEPHONE (OUTPATIENT)
Dept: FAMILY MEDICINE | Facility: CLINIC | Age: 58
End: 2018-10-05

## 2018-10-05 VITALS
DIASTOLIC BLOOD PRESSURE: 82 MMHG | TEMPERATURE: 99 F | OXYGEN SATURATION: 98 % | SYSTOLIC BLOOD PRESSURE: 138 MMHG | HEART RATE: 59 BPM | WEIGHT: 167 LBS | HEIGHT: 64 IN | BODY MASS INDEX: 28.51 KG/M2

## 2018-10-05 DIAGNOSIS — E78.2 MULTIPLE-TYPE HYPERLIPIDEMIA: ICD-10-CM

## 2018-10-05 DIAGNOSIS — R79.89 HIGH THYROID STIMULATING HORMONE (TSH) LEVEL: ICD-10-CM

## 2018-10-05 DIAGNOSIS — Z00.00 WELL ADULT EXAM: ICD-10-CM

## 2018-10-05 DIAGNOSIS — Z23 IMMUNIZATION DUE: Primary | ICD-10-CM

## 2018-10-05 DIAGNOSIS — R73.02 IGT (IMPAIRED GLUCOSE TOLERANCE): ICD-10-CM

## 2018-10-05 DIAGNOSIS — R79.89 HIGH SERUM THYROID STIMULATING HORMONE (TSH): ICD-10-CM

## 2018-10-05 DIAGNOSIS — G47.33 OBSTRUCTIVE SLEEP APNEA SYNDROME: ICD-10-CM

## 2018-10-05 PROBLEM — E66.09 CLASS 1 OBESITY DUE TO EXCESS CALORIES WITHOUT SERIOUS COMORBIDITY WITH BODY MASS INDEX (BMI) OF 30.0 TO 30.9 IN ADULT: Status: RESOLVED | Noted: 2018-03-26 | Resolved: 2018-10-05

## 2018-10-05 LAB — HBA1C MFR BLD: 5.4 %

## 2018-10-05 PROCEDURE — 99396 PREV VISIT EST AGE 40-64: CPT | Mod: 25,,, | Performed by: FAMILY MEDICINE

## 2018-10-05 PROCEDURE — 83036 HEMOGLOBIN GLYCOSYLATED A1C: CPT | Mod: QW,,, | Performed by: FAMILY MEDICINE

## 2018-10-05 PROCEDURE — 90686 IIV4 VACC NO PRSV 0.5 ML IM: CPT | Mod: ,,, | Performed by: FAMILY MEDICINE

## 2018-10-05 PROCEDURE — 90471 IMMUNIZATION ADMIN: CPT | Mod: ,,, | Performed by: FAMILY MEDICINE

## 2018-10-05 NOTE — PROGRESS NOTES
Subjective:       Patient ID: Patrice Smith is a 58 y.o. male.    Chief Complaint: Annual Exam      Patient is here today for his annual well visit.    Patient has had elevated blood sugar in the past but has improved since he has lost weight. Last blood sugar was 92 fasting.  Wt Readings from Last 3 Encounters:   10/05/18 75.8 kg (167 lb)   03/26/18 80.3 kg (177 lb)   10/09/17 80 kg (176 lb 5.9 oz)     Lab Results   Component Value Date    WBC 4.5 03/23/2018    HGB 16.5 03/23/2018    HCT 47.8 03/23/2018     03/23/2018    CHOL 113 09/28/2018    TRIG 89 09/28/2018    HDL 56 09/28/2018    ALT 36 09/28/2018    AST 22 09/28/2018     09/28/2018    K 4.4 09/28/2018     09/28/2018    CREATININE 1.05 09/28/2018    BUN 21 09/28/2018    CO2 24 09/28/2018    TSH 3.80 03/23/2018    PSA 1.6 02/06/2014    MICROALBUR <0.2 09/28/2018     PSA was 1.2 March 2018      Allergies and Medications:   Review of patient's allergies indicates:  No Known Allergies  Current Outpatient Medications   Medication Sig Dispense Refill    esomeprazole (NEXIUM) 40 MG capsule Take 1 capsule (40 mg total) by mouth before breakfast. 90 capsule 2    fluticasone (FLONASE) 50 mcg/actuation nasal spray 1 spray by Nasal route 2 (two) times daily as needed.      irbesartan (AVAPRO) 150 MG tablet Take 1 tablet (150 mg total) by mouth every evening. 30 tablet 2    multivitamin capsule Take 1 capsule by mouth once daily.        rosuvastatin (CRESTOR) 10 MG tablet Take 1 tablet (10 mg total) by mouth once daily. 90 tablet 2    tadalafil (CIALIS) 20 MG Tab Take 1 tablet (20 mg total) by mouth twice a week. As needed 30 tablet 3     No current facility-administered medications for this visit.        Family History:   Family History   Problem Relation Age of Onset    Parkinsonism Mother     Hypertension Mother     Ulcers Father     Arthritis Father     Diabetes Father     Hypertension Brother     Cancer Brother     Diabetes Brother      Anesthesia problems Neg Hx     Clotting disorder Neg Hx        Social History:   Social History     Socioeconomic History    Marital status:      Spouse name: Not on file    Number of children: Not on file    Years of education: Not on file    Highest education level: Not on file   Social Needs    Financial resource strain: Not on file    Food insecurity - worry: Not on file    Food insecurity - inability: Not on file    Transportation needs - medical: Not on file    Transportation needs - non-medical: Not on file   Occupational History    Occupation: Warehouse Mgr.    Tobacco Use    Smoking status: Never Smoker    Smokeless tobacco: Never Used   Substance and Sexual Activity    Alcohol use: Yes     Comment: social    Drug use: No    Sexual activity: Not on file   Other Topics Concern    Not on file   Social History Narrative    Not on file       Review of Systems   Constitutional: Positive for unexpected weight change (Intentional weight loss since last visit. ). Negative for activity change, appetite change, chills, diaphoresis, fatigue and fever.   HENT: Negative for congestion, dental problem, drooling, ear discharge, ear pain, facial swelling, hearing loss, mouth sores, nosebleeds, postnasal drip, rhinorrhea, sinus pressure, sinus pain, sneezing, sore throat, tinnitus, trouble swallowing and voice change.    Eyes: Negative for photophobia, pain, discharge, redness, itching and visual disturbance.   Respiratory: Positive for apnea ( Obstructive sleep apnea on AutoPap and well controlled.). Negative for cough, choking, chest tightness, shortness of breath, wheezing and stridor.    Cardiovascular: Negative for chest pain, palpitations and leg swelling.   Gastrointestinal: Negative for abdominal distention, abdominal pain, anal bleeding, blood in stool, constipation, diarrhea, nausea, rectal pain and vomiting.   Endocrine: Negative for cold intolerance, heat intolerance, polydipsia,  polyphagia and polyuria.   Genitourinary: Negative for decreased urine volume, difficulty urinating, discharge, dysuria, enuresis, flank pain, frequency, genital sores, hematuria, penile pain, penile swelling, scrotal swelling, testicular pain and urgency.        Some ED controlled with Cialis.   Musculoskeletal: Negative for arthralgias, back pain, gait problem, joint swelling, myalgias, neck pain and neck stiffness.   Skin: Negative for color change, pallor, rash and wound.   Allergic/Immunologic: Negative for environmental allergies, food allergies and immunocompromised state.   Neurological: Negative for dizziness, tremors, seizures, syncope, facial asymmetry, speech difficulty, weakness, light-headedness, numbness and headaches.   Hematological: Negative for adenopathy. Does not bruise/bleed easily.   Psychiatric/Behavioral: Positive for sleep disturbance. Negative for agitation, behavioral problems, confusion, decreased concentration, dysphoric mood, hallucinations, self-injury and suicidal ideas. The patient is not nervous/anxious and is not hyperactive.        Objective:     Vitals:    10/05/18 0829   BP: 138/82   Pulse: (!) 59   Temp: 98.8 °F (37.1 °C)        Physical Exam   Constitutional: He is oriented to person, place, and time. He appears well-developed and well-nourished.  Non-toxic appearance. He does not have a sickly appearance. He does not appear ill. No distress.   HENT:   Head: Normocephalic and atraumatic.   Right Ear: External ear normal.   Left Ear: External ear normal.   Nose: Nose normal.   Mouth/Throat: Oropharynx is clear and moist. No oropharyngeal exudate.   Eyes: Conjunctivae and EOM are normal. Pupils are equal, round, and reactive to light. Right eye exhibits no discharge. Left eye exhibits no discharge. No scleral icterus.   Neck: Normal range of motion. Neck supple. No JVD present. No tracheal deviation present. No thyromegaly present.   Cardiovascular: Normal rate, normal heart  sounds and intact distal pulses. Exam reveals no gallop and no friction rub.   No murmur heard.  Pulmonary/Chest: Effort normal and breath sounds normal. No stridor. No respiratory distress. He has no wheezes. He has no rales. He exhibits no tenderness.   Abdominal: Soft. Bowel sounds are normal. He exhibits no distension and no mass. There is no tenderness. There is no rebound and no guarding. No hernia.   Genitourinary: Rectum normal, prostate normal, testes normal and penis normal. Rectal exam shows guaiac negative stool. Cremasteric reflex is present. Right testis shows no mass, no swelling and no tenderness. Right testis is descended. Cremasteric reflex is not absent on the right side. Left testis shows no mass, no swelling and no tenderness. Left testis is descended. Cremasteric reflex is not absent on the left side. Circumcised. No phimosis, paraphimosis, hypospadias, penile erythema or penile tenderness. No discharge found.   Musculoskeletal: He exhibits no edema, tenderness or deformity.   Lymphadenopathy:     He has no cervical adenopathy.   Neurological: He is alert and oriented to person, place, and time. He has normal reflexes. He displays normal reflexes. No cranial nerve deficit or sensory deficit. He exhibits normal muscle tone. Coordination normal.   Skin: Skin is warm and dry. No rash noted. He is not diaphoretic. No erythema. No pallor.   Psychiatric: He has a normal mood and affect. His behavior is normal. Judgment and thought content normal.   Nursing note and vitals reviewed.      Assessment:       1. Immunization due    2. High thyroid stimulating hormone (TSH) level    3. Multiple-type hyperlipidemia    4. Obstructive sleep apnea syndrome    5. Well adult exam    6. IGT (impaired glucose tolerance)    7. High serum thyroid stimulating hormone (TSH)        Plan:       Patrice was seen today for annual exam.    Diagnoses and all orders for this visit:    Immunization due  -     Influenza -  Quadrivalent (3 years & older) (PF)    High thyroid stimulating hormone (TSH) level    Multiple-type hyperlipidemia    Obstructive sleep apnea syndrome    Well adult exam    IGT (impaired glucose tolerance)  -     POCT HEMOGLOBIN A1C    High serum thyroid stimulating hormone (TSH)         Follow-up in about 6 months (around 4/5/2019) for follow up cholesterol and blood sugar..

## 2018-10-08 ENCOUNTER — TELEPHONE (OUTPATIENT)
Dept: FAMILY MEDICINE | Facility: CLINIC | Age: 58
End: 2018-10-08

## 2018-10-08 NOTE — TELEPHONE ENCOUNTER
----- Message from MARGUERITE Valdivia sent at 10/5/2018 10:56 AM CDT -----  Labs look good.  Cholesterol has greatly improved and is now normal.  Continue current medication.

## 2018-10-17 ENCOUNTER — TELEPHONE (OUTPATIENT)
Dept: FAMILY MEDICINE | Facility: CLINIC | Age: 58
End: 2018-10-17

## 2018-10-17 LAB — TSH SERPL DL<=0.005 MIU/L-ACNC: 5.15 ULU/ML (ref 0.3–5.6)

## 2018-10-17 NOTE — TELEPHONE ENCOUNTER
----- Message from Ant Gallo MD sent at 10/17/2018 12:03 PM CDT -----  Results Ok, notify patient.

## 2018-10-19 DIAGNOSIS — I10 BENIGN HYPERTENSION: ICD-10-CM

## 2018-10-19 NOTE — TELEPHONE ENCOUNTER
Pharmacy faxed and requested a 90 day refill on Irbesartan tab 150 mg.    Patient was last seen in the office on 10/05/2018.

## 2018-10-22 RX ORDER — IRBESARTAN 150 MG/1
150 TABLET ORAL NIGHTLY
Qty: 90 TABLET | Refills: 3 | Status: SHIPPED | OUTPATIENT
Start: 2018-10-22 | End: 2018-10-24 | Stop reason: SDUPTHER

## 2018-10-24 DIAGNOSIS — I10 BENIGN HYPERTENSION: ICD-10-CM

## 2018-10-24 RX ORDER — IRBESARTAN 150 MG/1
150 TABLET ORAL NIGHTLY
Qty: 90 TABLET | Refills: 3 | Status: SHIPPED | OUTPATIENT
Start: 2018-10-24 | End: 2018-10-29 | Stop reason: SDUPTHER

## 2018-10-29 DIAGNOSIS — I10 BENIGN HYPERTENSION: ICD-10-CM

## 2018-10-29 RX ORDER — IRBESARTAN 150 MG/1
150 TABLET ORAL NIGHTLY
Qty: 90 TABLET | Refills: 3 | Status: SHIPPED | OUTPATIENT
Start: 2018-10-29 | End: 2019-04-05

## 2018-10-29 NOTE — TELEPHONE ENCOUNTER
Patient sent a message to the portal requesting a refill on Avapro.    Patient was last seen in the office on 10/5/2018.

## 2018-11-19 DIAGNOSIS — N52.8 OTHER MALE ERECTILE DYSFUNCTION: ICD-10-CM

## 2018-11-24 RX ORDER — TADALAFIL 20 MG/1
20 TABLET ORAL
Qty: 30 TABLET | Refills: 3 | Status: SHIPPED | OUTPATIENT
Start: 2018-11-26 | End: 2019-02-01 | Stop reason: SDUPTHER

## 2018-11-26 DIAGNOSIS — K21.9 GASTROESOPHAGEAL REFLUX DISEASE WITHOUT ESOPHAGITIS: ICD-10-CM

## 2018-11-26 DIAGNOSIS — E78.2 MULTIPLE-TYPE HYPERLIPIDEMIA: ICD-10-CM

## 2018-11-27 RX ORDER — ROSUVASTATIN CALCIUM 10 MG/1
10 TABLET, COATED ORAL DAILY
Qty: 90 TABLET | Refills: 2 | Status: SHIPPED | OUTPATIENT
Start: 2018-11-27 | End: 2018-11-29 | Stop reason: SDUPTHER

## 2018-11-27 RX ORDER — PANTOPRAZOLE SODIUM 40 MG/1
40 TABLET, DELAYED RELEASE ORAL DAILY
Qty: 90 TABLET | Refills: 3 | Status: SHIPPED | OUTPATIENT
Start: 2018-11-27 | End: 2019-11-04 | Stop reason: SDUPTHER

## 2018-11-27 RX ORDER — ESOMEPRAZOLE MAGNESIUM 40 MG/1
40 CAPSULE, DELAYED RELEASE ORAL
Qty: 90 CAPSULE | Refills: 2 | Status: CANCELLED | OUTPATIENT
Start: 2018-11-27

## 2018-11-27 NOTE — TELEPHONE ENCOUNTER
Pharmacy faxed in a refill request on Rosuvastatin tabs 10 mg and Esomeprazole MAG DR CAPS 40 MG.   They are also requesting a 90 day supply.     Patient was last seen in the office on 10/5/2018.

## 2018-11-29 DIAGNOSIS — E78.2 MULTIPLE-TYPE HYPERLIPIDEMIA: ICD-10-CM

## 2018-11-29 NOTE — TELEPHONE ENCOUNTER
Pharmacy sent a refill request for Rosuvastatin 10 mg Tablets.     Patient was last seen in the office on 10/5/2018.

## 2018-12-03 RX ORDER — ROSUVASTATIN CALCIUM 10 MG/1
10 TABLET, COATED ORAL DAILY
Qty: 90 TABLET | Refills: 1 | Status: SHIPPED | OUTPATIENT
Start: 2018-12-03 | End: 2019-08-16 | Stop reason: SDUPTHER

## 2018-12-06 DIAGNOSIS — J30.2 PERENNIAL ALLERGIC RHINITIS WITH SEASONAL VARIATION: Primary | ICD-10-CM

## 2018-12-06 DIAGNOSIS — J30.89 PERENNIAL ALLERGIC RHINITIS WITH SEASONAL VARIATION: Primary | ICD-10-CM

## 2018-12-06 RX ORDER — FLUTICASONE PROPIONATE 50 MCG
1 SPRAY, SUSPENSION (ML) NASAL 2 TIMES DAILY PRN
Qty: 3 BOTTLE | Refills: 1 | Status: SHIPPED | OUTPATIENT
Start: 2018-12-06 | End: 2019-05-01 | Stop reason: SDUPTHER

## 2019-01-30 NOTE — PROGRESS NOTES
CHIEF COMPLAINT:    Mr. Smith is a 58 y.o. male presenting for a follow up on family history of prostate cancer (brother) and erectile dysfunction.    PRESENTING ILLNESS:    Patrice Smith is a 58 y.o. male who returns for follow up.  He is doing well.  Has no voiding complaints.  He continues to use Cialis with success.  No changes in dose. He did have a calf injury.      REVIEW OF SYSTEMS:    Review of Systems   Constitutional: Negative.    HENT: Negative.    Eyes: Negative.    Respiratory: Negative.    Cardiovascular: Negative.    Gastrointestinal: Negative.    Genitourinary: Negative.    Musculoskeletal: Positive for myalgias.   Skin: Negative.    Neurological: Negative.    Endo/Heme/Allergies: Negative.    Psychiatric/Behavioral: Negative.        PATIENT HISTORY:    Past Medical History:   Diagnosis Date    Acid reflux     Hyperlipidemia     Hypertension        No past surgical history on file.    Family History   Problem Relation Age of Onset    Parkinsonism Mother     Hypertension Mother     Ulcers Father     Arthritis Father     Diabetes Father     Hypertension Brother     Cancer Brother     Diabetes Brother      Socioeconomic History    Marital status:    Occupational History    Occupation: WarehIntern Mgr.    Tobacco Use    Smoking status: Never Smoker    Smokeless tobacco: Never Used   Substance and Sexual Activity    Alcohol use: Yes     Comment: social    Drug use: No    Sexual activity: Not on file       Allergies:  Patient has no known allergies.    Medications:  Outpatient Encounter Medications as of 2/1/2019   Medication Sig Dispense Refill    fluticasone (FLONASE) 50 mcg/actuation nasal spray 1 spray (50 mcg total) by Each Nare route 2 (two) times daily as needed. 3 Bottle 1    irbesartan (AVAPRO) 150 MG tablet Take 1 tablet (150 mg total) by mouth every evening. 90 tablet 3    multivitamin capsule Take 1 capsule by mouth once daily.        pantoprazole (PROTONIX) 40  MG tablet Take 1 tablet (40 mg total) by mouth once daily. 90 tablet 3    rosuvastatin (CRESTOR) 10 MG tablet Take 1 tablet (10 mg total) by mouth once daily. 90 tablet 1    [START ON 2/4/2019] tadalafil (CIALIS) 20 MG Tab Take 1 tablet (20 mg total) by mouth twice a week. As needed 30 tablet 3    [DISCONTINUED] tadalafil (CIALIS) 20 MG Tab Take 1 tablet (20 mg total) by mouth twice a week. As needed 30 tablet 3     No facility-administered encounter medications on file as of 2/1/2019.          PHYSICAL EXAMINATION:    The patient generally appears in good health, is appropriately interactive, and is in no apparent distress.    Skin: No lesions.    Mental: Cooperative with normal affect.    Neuro: Grossly intact.    HEENT: Normal. No evidence of lymphadenopathy.    Chest:  normal inspiratory effort.    Abdomen: Soft, non-tender. No masses or organomegaly. Bladder is not palpable. No evidence of flank discomfort. No evidence of inguinal hernia.    Extremities: No clubbing, cyanosis, or edema    Scrotum showed no rashes or lesions. Testicles showed no masses or tenderness.  Epididymis showed no masses or tenderness.  Penis was circumcised. No meatal stenosis. No penile discharge.  No inguinal hernias.  No inguinal lymphadenopathy.    CRISTÓBAL:  30 g prostate. No nodularity.  Normal rectal tone, no rectal masses.     LABS:    Lab Results   Component Value Date    BUN 21 09/28/2018    CREATININE 1.05 09/28/2018     UA 1.005, pH 7, otherwise, negative    PSA 1.2 done 4 months ago at an outside lab.     IMPRESSION:    Encounter Diagnoses   Name Primary?    Other male erectile dysfunction        PLAN:    1.  Refilled Cialis  2.  Follow up in 1 year.

## 2019-02-01 ENCOUNTER — OFFICE VISIT (OUTPATIENT)
Dept: UROLOGY | Facility: CLINIC | Age: 59
End: 2019-02-01
Payer: OTHER GOVERNMENT

## 2019-02-01 VITALS
WEIGHT: 182.31 LBS | SYSTOLIC BLOOD PRESSURE: 154 MMHG | DIASTOLIC BLOOD PRESSURE: 88 MMHG | BODY MASS INDEX: 31.12 KG/M2 | HEART RATE: 54 BPM | HEIGHT: 64 IN

## 2019-02-01 DIAGNOSIS — N52.8 OTHER MALE ERECTILE DYSFUNCTION: ICD-10-CM

## 2019-02-01 PROCEDURE — 99999 PR PBB SHADOW E&M-EST. PATIENT-LVL III: ICD-10-PCS | Mod: PBBFAC,,, | Performed by: UROLOGY

## 2019-02-01 PROCEDURE — 99213 PR OFFICE/OUTPT VISIT, EST, LEVL III, 20-29 MIN: ICD-10-PCS | Mod: 25,S$PBB,, | Performed by: UROLOGY

## 2019-02-01 PROCEDURE — 99213 OFFICE O/P EST LOW 20 MIN: CPT | Mod: 25,S$PBB,, | Performed by: UROLOGY

## 2019-02-01 PROCEDURE — 99999 PR PBB SHADOW E&M-EST. PATIENT-LVL III: CPT | Mod: PBBFAC,,, | Performed by: UROLOGY

## 2019-02-01 RX ORDER — TADALAFIL 20 MG/1
20 TABLET ORAL
Qty: 30 TABLET | Refills: 3 | Status: SHIPPED | OUTPATIENT
Start: 2019-02-04 | End: 2022-06-22 | Stop reason: SDUPTHER

## 2019-03-25 ENCOUNTER — TELEPHONE (OUTPATIENT)
Dept: FAMILY MEDICINE | Facility: CLINIC | Age: 59
End: 2019-03-25

## 2019-03-25 DIAGNOSIS — Z80.42 FAMILY HISTORY OF PROSTATE CANCER: ICD-10-CM

## 2019-03-25 DIAGNOSIS — R79.89 HIGH THYROID STIMULATING HORMONE (TSH) LEVEL: ICD-10-CM

## 2019-03-25 DIAGNOSIS — I10 HYPERTENSION, UNSPECIFIED TYPE: Primary | ICD-10-CM

## 2019-03-25 DIAGNOSIS — E78.2 MULTIPLE-TYPE HYPERLIPIDEMIA: ICD-10-CM

## 2019-04-03 LAB
ALBUMIN SERPL-MCNC: 4.9 G/DL (ref 3.5–5.5)
ALBUMIN/CREAT UR: <6.4 MG/G CREAT (ref 0–30)
ALBUMIN/GLOB SERPL: 2.2 {RATIO} (ref 1.2–2.2)
ALP SERPL-CCNC: 47 IU/L (ref 39–117)
ALT SERPL-CCNC: 36 IU/L (ref 0–44)
AST SERPL-CCNC: 24 IU/L (ref 0–40)
BASOPHILS # BLD AUTO: 0 X10E3/UL (ref 0–0.2)
BASOPHILS NFR BLD AUTO: 0 %
BILIRUB SERPL-MCNC: 0.7 MG/DL (ref 0–1.2)
BUN SERPL-MCNC: 20 MG/DL (ref 6–24)
BUN/CREAT SERPL: 19 (ref 9–20)
CALCIUM SERPL-MCNC: 9.5 MG/DL (ref 8.7–10.2)
CHLORIDE SERPL-SCNC: 101 MMOL/L (ref 96–106)
CHOLEST SERPL-MCNC: 113 MG/DL (ref 100–199)
CO2 SERPL-SCNC: 23 MMOL/L (ref 20–29)
CREAT SERPL-MCNC: 1.03 MG/DL (ref 0.76–1.27)
CREAT UR-MCNC: 47 MG/DL
EOSINOPHIL # BLD AUTO: 0.2 X10E3/UL (ref 0–0.4)
EOSINOPHIL NFR BLD AUTO: 4 %
ERYTHROCYTE [DISTWIDTH] IN BLOOD BY AUTOMATED COUNT: 13.5 % (ref 12.3–15.4)
GLOBULIN SER CALC-MCNC: 2.2 G/DL (ref 1.5–4.5)
GLUCOSE SERPL-MCNC: 104 MG/DL (ref 65–99)
HCT VFR BLD AUTO: 44.7 % (ref 37.5–51)
HDLC SERPL-MCNC: 57 MG/DL
HGB BLD-MCNC: 15.1 G/DL (ref 13–17.7)
IMM GRANULOCYTES # BLD AUTO: 0 X10E3/UL (ref 0–0.1)
IMM GRANULOCYTES NFR BLD AUTO: 0 %
LDLC SERPL CALC-MCNC: 40 MG/DL (ref 0–99)
LYMPHOCYTES # BLD AUTO: 1.7 X10E3/UL (ref 0.7–3.1)
LYMPHOCYTES NFR BLD AUTO: 41 %
MCH RBC QN AUTO: 33.7 PG (ref 26.6–33)
MCHC RBC AUTO-ENTMCNC: 33.8 G/DL (ref 31.5–35.7)
MCV RBC AUTO: 100 FL (ref 79–97)
MICROALBUMIN UR-MCNC: <3 UG/ML
MONOCYTES # BLD AUTO: 0.4 X10E3/UL (ref 0.1–0.9)
MONOCYTES NFR BLD AUTO: 9 %
NEUTROPHILS # BLD AUTO: 1.9 X10E3/UL (ref 1.4–7)
NEUTROPHILS NFR BLD AUTO: 46 %
PLATELET # BLD AUTO: 216 X10E3/UL (ref 150–379)
POTASSIUM SERPL-SCNC: 4.6 MMOL/L (ref 3.5–5.2)
PROT SERPL-MCNC: 7.1 G/DL (ref 6–8.5)
PSA SERPL-MCNC: 1.6 NG/ML (ref 0–4)
RBC # BLD AUTO: 4.48 X10E6/UL (ref 4.14–5.8)
SODIUM SERPL-SCNC: 138 MMOL/L (ref 134–144)
TRIGL SERPL-MCNC: 79 MG/DL (ref 0–149)
TSH SERPL DL<=0.005 MIU/L-ACNC: 4.57 UIU/ML (ref 0.45–4.5)
VLDLC SERPL CALC-MCNC: 16 MG/DL (ref 5–40)
WBC # BLD AUTO: 4 X10E3/UL (ref 3.4–10.8)

## 2019-04-05 ENCOUNTER — OFFICE VISIT (OUTPATIENT)
Dept: FAMILY MEDICINE | Facility: CLINIC | Age: 59
End: 2019-04-05
Payer: OTHER GOVERNMENT

## 2019-04-05 VITALS
DIASTOLIC BLOOD PRESSURE: 78 MMHG | TEMPERATURE: 99 F | RESPIRATION RATE: 17 BRPM | WEIGHT: 172.5 LBS | OXYGEN SATURATION: 99 % | BODY MASS INDEX: 29.45 KG/M2 | HEART RATE: 52 BPM | SYSTOLIC BLOOD PRESSURE: 144 MMHG | HEIGHT: 64 IN

## 2019-04-05 DIAGNOSIS — I10 ESSENTIAL HYPERTENSION: Primary | ICD-10-CM

## 2019-04-05 DIAGNOSIS — R73.02 IGT (IMPAIRED GLUCOSE TOLERANCE): ICD-10-CM

## 2019-04-05 DIAGNOSIS — E66.3 OVERWEIGHT: ICD-10-CM

## 2019-04-05 PROCEDURE — 99213 OFFICE O/P EST LOW 20 MIN: CPT | Mod: ,,, | Performed by: FAMILY MEDICINE

## 2019-04-05 PROCEDURE — 99213 PR OFFICE/OUTPT VISIT, EST, LEVL III, 20-29 MIN: ICD-10-PCS | Mod: ,,, | Performed by: FAMILY MEDICINE

## 2019-04-05 RX ORDER — IRBESARTAN 300 MG/1
300 TABLET ORAL NIGHTLY
Qty: 90 TABLET | Refills: 3 | Status: SHIPPED | OUTPATIENT
Start: 2019-04-05 | End: 2019-07-02 | Stop reason: SDUPTHER

## 2019-04-05 NOTE — PROGRESS NOTES
Subjective:       Patient ID: Patrice Smith is a 59 y.o. male.    Chief Complaint: Diabetes      Patient is here for a six-month follow-up on his diabetes. He was recently diagnosed and is on medication or home testing yet.  Lab Results       Component                Value               Date                       HGBA1C                   5.4                 10/05/2018            Wt Readings from Last 3 Encounters:  04/05/19 : 78.2 kg (172 lb 8 oz)  02/01/19 : 82.7 kg (182 lb 5.1 oz)  BP Readings from Last 3 Encounters:  04/05/19 : (!) 144/78  02/01/19 : (!) 154/88  10/05/18 : 138/82    10/05/18 : 75.8 kg (167 lb)          Diabetes   He presents for his follow-up diabetic visit. He has type 2 diabetes mellitus.       Allergies and Medications:   Review of patient's allergies indicates:  No Known Allergies  Current Outpatient Medications   Medication Sig Dispense Refill    fluticasone (FLONASE) 50 mcg/actuation nasal spray 1 spray (50 mcg total) by Each Nare route 2 (two) times daily as needed. 3 Bottle 1    multivitamin capsule Take 1 capsule by mouth once daily.        pantoprazole (PROTONIX) 40 MG tablet Take 1 tablet (40 mg total) by mouth once daily. 90 tablet 3    rosuvastatin (CRESTOR) 10 MG tablet Take 1 tablet (10 mg total) by mouth once daily. 90 tablet 1    tadalafil (CIALIS) 20 MG Tab Take 1 tablet (20 mg total) by mouth twice a week. As needed 30 tablet 3    irbesartan (AVAPRO) 300 MG tablet Take 1 tablet (300 mg total) by mouth every evening. 90 tablet 3     No current facility-administered medications for this visit.        Family History:   Family History   Problem Relation Age of Onset    Parkinsonism Mother     Hypertension Mother     Ulcers Father     Arthritis Father     Diabetes Father     Hypertension Brother     Cancer Brother     Diabetes Brother     Anesthesia problems Neg Hx     Clotting disorder Neg Hx        Social History:   Social History     Socioeconomic History     Marital status:      Spouse name: Not on file    Number of children: Not on file    Years of education: Not on file    Highest education level: Not on file   Occupational History    Occupation: Warehouse Mgr.    Social Needs    Financial resource strain: Not on file    Food insecurity:     Worry: Not on file     Inability: Not on file    Transportation needs:     Medical: Not on file     Non-medical: Not on file   Tobacco Use    Smoking status: Never Smoker    Smokeless tobacco: Never Used   Substance and Sexual Activity    Alcohol use: Yes     Comment: social    Drug use: No    Sexual activity: Not on file   Lifestyle    Physical activity:     Days per week: Not on file     Minutes per session: Not on file    Stress: Not at all   Relationships    Social connections:     Talks on phone: Not on file     Gets together: Not on file     Attends Synagogue service: Not on file     Active member of club or organization: Not on file     Attends meetings of clubs or organizations: Not on file     Relationship status: Not on file   Other Topics Concern    Not on file   Social History Narrative    Not on file       Review of Systems    Objective:     Vitals:    04/05/19 0935   BP: (!) 144/78   Pulse:    Resp:    Temp:         Physical Exam   Cardiovascular: Normal rate, regular rhythm, normal heart sounds and intact distal pulses. Exam reveals no gallop and no friction rub.   No murmur heard.  Pulmonary/Chest: Effort normal and breath sounds normal. No stridor. No respiratory distress. He has no wheezes. He has no rales. He exhibits no tenderness.       Assessment:       1. Essential hypertension    2. Overweight    3. IGT (impaired glucose tolerance)        Plan:       Patrice was seen today for diabetes.    Diagnoses and all orders for this visit:    Essential hypertension  -     irbesartan (AVAPRO) 300 MG tablet; Take 1 tablet (300 mg total) by mouth every evening.    Overweight    IGT (impaired  glucose tolerance)         Follow up in about 6 months (around 10/5/2019) for follow uo DM, follow up HTN.

## 2019-04-05 NOTE — PATIENT INSTRUCTIONS
Diabetes: Learning About Serving and Portion Sizes     A good rule of thumb: Devote half your plate to vegetables and green salad. Split the other half between protein and starchy carbohydrates. Fruit makes a good dessert.     Servings and portions. Whats the difference? These terms can be very confusing. But learning to measure serving sizes can help you figure out how many carbohydrates (carbs) and other foods you eat each day. They are also powerful tools for managing your weight.  Servings and portions  Many different words are used to describe amounts of food. If your health care provider uses a term youre not sure of, dont be afraid to ask. It helps to know the difference between servings and portions:  · A serving size is a fixed size. Food producers use this term to describe their products. For example, the label on a cereal box could say that 1 cup of dry cereal = 1 serving.  · A portion (also called a helping) is how much you eat or how much you put on your plate at a meal. For example, you might eat 2 cups of cereal at breakfast.  Using serving information  The portion you choose to eat (such as 2 cups of cereal) may be more than one serving as listed on the food label (such as 1 cup of cereal). Thats why it helps to measure or weigh the food you eat. Because the food label values are based on servings, youll need to know how many servings you eat at one sitting.     Ounces: 2 to 3 ounces is about the size of your palm.       1 Cup: 1 cup (or a medium-sized piece) is about the size of your fist.       1/2 Cup: 1/2 cup is about the size of your cupped hand.      One tablespoon is about the size of your thumb.  One teaspoon is about the size of the tip of your thumb.  Keeping track of serving sizes  When youre planning for a snack or a meal, keep servings in mind. If you dont have measuring cups or a scale handy, there are ways to eyeball serving sizes, such as comparing your food to the size  of your hand (see pictures above).  Managing portion sizes  If your weight is a concern, reducing your portions can help. You can eat more than one serving of a food at once. But to keep from eating too much at one meal, learn how to manage your portions. A portion is the amount of each type of food on your plate. See the plate diagram for an example of balanced portions.  Date Last Reviewed: 3/1/2016  © 1014-5496 [employer]. [address] [place] [address], [place] [address]. All rights reserved. This information is not intended as a substitute for professional medical care. Always follow your healthcare professional's instructions.

## 2019-05-01 DIAGNOSIS — J30.89 PERENNIAL ALLERGIC RHINITIS WITH SEASONAL VARIATION: ICD-10-CM

## 2019-05-01 DIAGNOSIS — J30.2 PERENNIAL ALLERGIC RHINITIS WITH SEASONAL VARIATION: ICD-10-CM

## 2019-05-02 RX ORDER — FLUTICASONE PROPIONATE 50 MCG
SPRAY, SUSPENSION (ML) NASAL
Qty: 48 G | Refills: 5 | Status: SHIPPED | OUTPATIENT
Start: 2019-05-02 | End: 2021-05-10 | Stop reason: SDUPTHER

## 2019-07-02 DIAGNOSIS — I10 ESSENTIAL HYPERTENSION: ICD-10-CM

## 2019-07-02 RX ORDER — IRBESARTAN 300 MG/1
300 TABLET ORAL NIGHTLY
Qty: 90 TABLET | Refills: 1 | Status: SHIPPED | OUTPATIENT
Start: 2019-07-02 | End: 2019-09-26 | Stop reason: SDUPTHER

## 2019-08-16 DIAGNOSIS — E78.2 MULTIPLE-TYPE HYPERLIPIDEMIA: ICD-10-CM

## 2019-08-16 RX ORDER — ROSUVASTATIN CALCIUM 10 MG/1
10 TABLET, COATED ORAL DAILY
Qty: 90 TABLET | Refills: 0 | Status: SHIPPED | OUTPATIENT
Start: 2019-08-16 | End: 2019-10-23 | Stop reason: SDUPTHER

## 2019-09-26 DIAGNOSIS — I10 ESSENTIAL HYPERTENSION: ICD-10-CM

## 2019-09-26 RX ORDER — IRBESARTAN 300 MG/1
300 TABLET ORAL NIGHTLY
Qty: 90 TABLET | Refills: 1 | Status: SHIPPED | OUTPATIENT
Start: 2019-09-26 | End: 2019-10-11

## 2019-10-01 ENCOUNTER — TELEPHONE (OUTPATIENT)
Dept: FAMILY MEDICINE | Facility: CLINIC | Age: 59
End: 2019-10-01

## 2019-10-01 DIAGNOSIS — R79.89 HIGH SERUM THYROID STIMULATING HORMONE (TSH): ICD-10-CM

## 2019-10-01 DIAGNOSIS — E78.2 MULTIPLE-TYPE HYPERLIPIDEMIA: Primary | ICD-10-CM

## 2019-10-04 ENCOUNTER — PATIENT MESSAGE (OUTPATIENT)
Dept: FAMILY MEDICINE | Facility: CLINIC | Age: 59
End: 2019-10-04

## 2019-10-08 LAB
ALBUMIN SERPL-MCNC: 5 G/DL (ref 3.5–5.5)
ALBUMIN/GLOB SERPL: 2.5 {RATIO} (ref 1.2–2.2)
ALP SERPL-CCNC: 46 IU/L (ref 39–117)
ALT SERPL-CCNC: 37 IU/L (ref 0–44)
AST SERPL-CCNC: 25 IU/L (ref 0–40)
BILIRUB SERPL-MCNC: 0.9 MG/DL (ref 0–1.2)
BUN SERPL-MCNC: 21 MG/DL (ref 6–24)
BUN/CREAT SERPL: 19 (ref 9–20)
CALCIUM SERPL-MCNC: 9.3 MG/DL (ref 8.7–10.2)
CHLORIDE SERPL-SCNC: 100 MMOL/L (ref 96–106)
CHOLEST SERPL-MCNC: 103 MG/DL (ref 100–199)
CO2 SERPL-SCNC: 23 MMOL/L (ref 20–29)
CREAT SERPL-MCNC: 1.12 MG/DL (ref 0.76–1.27)
GLOBULIN SER CALC-MCNC: 2 G/DL (ref 1.5–4.5)
GLUCOSE SERPL-MCNC: 102 MG/DL (ref 65–99)
HDLC SERPL-MCNC: 53 MG/DL
LDLC SERPL CALC-MCNC: 36 MG/DL (ref 0–99)
POTASSIUM SERPL-SCNC: 5 MMOL/L (ref 3.5–5.2)
PROT SERPL-MCNC: 7 G/DL (ref 6–8.5)
SODIUM SERPL-SCNC: 138 MMOL/L (ref 134–144)
TRIGL SERPL-MCNC: 72 MG/DL (ref 0–149)
TSH SERPL DL<=0.005 MIU/L-ACNC: 5.02 UIU/ML (ref 0.45–4.5)
VLDLC SERPL CALC-MCNC: 14 MG/DL (ref 5–40)

## 2019-10-08 NOTE — TELEPHONE ENCOUNTER
Labs returned normal except for mildly elevated glucose this is chronic and stable continue sugar free low carb diet recheck in 6 months

## 2019-10-10 ENCOUNTER — TELEPHONE (OUTPATIENT)
Dept: FAMILY MEDICINE | Facility: CLINIC | Age: 59
End: 2019-10-10

## 2019-10-10 NOTE — TELEPHONE ENCOUNTER
----- Message from Ant Gallo MD sent at 10/8/2019  8:16 AM CDT -----  Labs returned normal except for mildly elevated glucose this is chronic and stable continue sugar free low carb diet recheck in 6 months

## 2019-10-11 ENCOUNTER — OFFICE VISIT (OUTPATIENT)
Dept: FAMILY MEDICINE | Facility: CLINIC | Age: 59
End: 2019-10-11
Payer: OTHER GOVERNMENT

## 2019-10-11 VITALS
RESPIRATION RATE: 17 BRPM | OXYGEN SATURATION: 99 % | DIASTOLIC BLOOD PRESSURE: 76 MMHG | SYSTOLIC BLOOD PRESSURE: 146 MMHG | HEART RATE: 57 BPM | BODY MASS INDEX: 28.48 KG/M2 | WEIGHT: 166.81 LBS | HEIGHT: 64 IN | TEMPERATURE: 98 F

## 2019-10-11 DIAGNOSIS — Z00.00 WELL ADULT EXAM: Primary | ICD-10-CM

## 2019-10-11 DIAGNOSIS — Z23 NEED FOR INFLUENZA VACCINATION: ICD-10-CM

## 2019-10-11 DIAGNOSIS — I10 ESSENTIAL HYPERTENSION: ICD-10-CM

## 2019-10-11 DIAGNOSIS — Z23 IMMUNIZATION DUE: ICD-10-CM

## 2019-10-11 DIAGNOSIS — E66.3 OVERWEIGHT: ICD-10-CM

## 2019-10-11 DIAGNOSIS — E78.2 MULTIPLE-TYPE HYPERLIPIDEMIA: ICD-10-CM

## 2019-10-11 DIAGNOSIS — G47.33 OBSTRUCTIVE SLEEP APNEA SYNDROME: ICD-10-CM

## 2019-10-11 PROCEDURE — 99396 PREV VISIT EST AGE 40-64: CPT | Mod: 25,S$PBB,, | Performed by: FAMILY MEDICINE

## 2019-10-11 PROCEDURE — 90471 IMMUNIZATION ADMIN: CPT | Mod: PBBFAC | Performed by: FAMILY MEDICINE

## 2019-10-11 PROCEDURE — 99396 PR PREVENTIVE VISIT,EST,40-64: ICD-10-PCS | Mod: 25,S$PBB,, | Performed by: FAMILY MEDICINE

## 2019-10-11 PROCEDURE — 99213 OFFICE O/P EST LOW 20 MIN: CPT | Performed by: FAMILY MEDICINE

## 2019-10-11 RX ORDER — NAPROXEN SODIUM 220 MG/1
81 TABLET, FILM COATED ORAL DAILY
Refills: 0 | COMMUNITY
Start: 2019-10-11 | End: 2022-06-16

## 2019-10-11 RX ORDER — METOPROLOL SUCCINATE 50 MG/1
50 TABLET, EXTENDED RELEASE ORAL DAILY
Qty: 30 TABLET | Refills: 11 | Status: SHIPPED | OUTPATIENT
Start: 2019-10-11 | End: 2019-10-18 | Stop reason: ALTCHOICE

## 2019-10-11 NOTE — PROGRESS NOTES
Subjective:       Patient ID: Patrice Smith is a 59 y.o. male.    Chief Complaint: Annual Exam      Patient is here for his annual physical today he reports that his blood pressure is normally stable and controlled at home,  BP Readings from Last 3 Encounters:  10/11/19 : (!) 146/76  04/05/19 : (!) 144/78  02/01/19 : (!) 154/88  Lab Results       Component                Value               Date                       WBC                      4.0                 04/02/2019                 HGB                      15.1                04/02/2019                 HCT                      44.7                04/02/2019                 PLT                      216                 04/02/2019                 CHOL                     103                 10/07/2019                 TRIG                     72                  10/07/2019                 HDL                      53                  10/07/2019                 ALT                      37                  10/07/2019                 AST                      25                  10/07/2019                 NA                       138                 10/07/2019                 K                        5.0                 10/07/2019                 CL                       100                 10/07/2019                 CREATININE               1.12                10/07/2019                 BUN                      21                  10/07/2019                 CO2                      23                  10/07/2019                 TSH                      5.020 (H)           10/07/2019                 PSA                      1.6                 02/06/2014                 HGBA1C                   5.4                 10/05/2018                 MICROALBUR               <3.0                04/02/2019            Wt Readings from Last 3 Encounters:  10/11/19 : 75.7 kg (166 lb 12.8 oz)  04/05/19 : 78.2 kg (172 lb 8 oz)  02/01/19 : 82.7 kg (182 lb 5.1 oz)  Has had to make  major lifestyle and dietary changes because of wife's beef and meat allergy.          Allergies and Medications:   Review of patient's allergies indicates:  No Known Allergies  Current Outpatient Medications   Medication Sig Dispense Refill    fluticasone propionate (FLONASE) 50 mcg/actuation nasal spray USE 1 SPRAY IN EACH NOSTRIL TWICE A DAY AS NEEDED 48 g 5    multivitamin capsule Take 1 capsule by mouth once daily.        pantoprazole (PROTONIX) 40 MG tablet Take 1 tablet (40 mg total) by mouth once daily. 90 tablet 3    rosuvastatin (CRESTOR) 10 MG tablet Take 1 tablet (10 mg total) by mouth once daily. 90 tablet 0    tadalafil (CIALIS) 20 MG Tab Take 1 tablet (20 mg total) by mouth twice a week. As needed 30 tablet 3    aspirin 81 MG Chew Take 1 tablet (81 mg total) by mouth once daily.  0    metoprolol succinate (TOPROL-XL) 50 MG 24 hr tablet Take 1 tablet (50 mg total) by mouth once daily. 30 tablet 11     No current facility-administered medications for this visit.        Family History:   Family History   Problem Relation Age of Onset    Parkinsonism Mother     Hypertension Mother     Ulcers Father     Arthritis Father     Diabetes Father     Hypertension Brother     Cancer Brother     Diabetes Brother     Anesthesia problems Neg Hx     Clotting disorder Neg Hx        Social History:   Social History     Socioeconomic History    Marital status:      Spouse name: Not on file    Number of children: Not on file    Years of education: Not on file    Highest education level: Not on file   Occupational History    Occupation: Warehouse Mgr.    Social Needs    Financial resource strain: Not on file    Food insecurity:     Worry: Not on file     Inability: Not on file    Transportation needs:     Medical: Not on file     Non-medical: Not on file   Tobacco Use    Smoking status: Never Smoker    Smokeless tobacco: Never Used   Substance and Sexual Activity    Alcohol use: Yes      Comment: social    Drug use: No    Sexual activity: Not on file   Lifestyle    Physical activity:     Days per week: Not on file     Minutes per session: Not on file    Stress: Not at all   Relationships    Social connections:     Talks on phone: Not on file     Gets together: Not on file     Attends [employer] service: Not on file     Active member of club or organization: Not on file     Attends meetings of clubs or organizations: Not on file     Relationship status: Not on file   Other Topics Concern    Not on file   Social History Narrative    Not on file       Review of Systems   Constitutional: Positive for unexpected weight change ( 12 lb intention loss). Negative for activity change, appetite change, chills, diaphoresis, fatigue and fever.   HENT: Negative for congestion, dental problem, drooling, ear discharge, ear pain, facial swelling, hearing loss, mouth sores, nosebleeds, postnasal drip, rhinorrhea, sinus pressure, sinus pain, sneezing, sore throat, tinnitus, trouble swallowing and voice change.    Eyes: Negative for photophobia, pain, discharge, redness, itching and visual disturbance.   Respiratory: Positive for apnea (on autopap). Negative for cough, choking, chest tightness, shortness of breath, wheezing and stridor.    Cardiovascular: Negative for chest pain, palpitations and leg swelling.   Gastrointestinal: Negative for abdominal distention, abdominal pain, anal bleeding, blood in stool, constipation, diarrhea, nausea, rectal pain and vomiting.   Endocrine: Negative for cold intolerance, heat intolerance, polydipsia, polyphagia and polyuria.   Genitourinary: Negative for decreased urine volume, difficulty urinating, discharge, dysuria, enuresis, flank pain, frequency, genital sores, hematuria, penile pain, penile swelling, scrotal swelling, testicular pain and urgency.        No nocturia   Musculoskeletal: Positive for arthralgias ( shoulders) and back pain (low back). Negative for gait  problem, joint swelling, myalgias, neck pain and neck stiffness.   Skin: Negative for color change, pallor, rash and wound.   Allergic/Immunologic: Negative for environmental allergies, food allergies and immunocompromised state.   Neurological: Negative for dizziness, tremors, seizures, syncope, facial asymmetry, speech difficulty, weakness, light-headedness, numbness and headaches.   Hematological: Negative for adenopathy. Does not bruise/bleed easily.   Psychiatric/Behavioral: Negative for agitation, behavioral problems, confusion, decreased concentration, dysphoric mood, hallucinations, self-injury, sleep disturbance and suicidal ideas. The patient is not nervous/anxious and is not hyperactive.        Objective:     Vitals:    10/11/19 0830   BP: (!) 146/76   Pulse: (!) 57   Resp: 17   Temp: 98.4 °F (36.9 °C)        Physical Exam   Constitutional: He is oriented to person, place, and time. He appears well-developed and well-nourished.  Non-toxic appearance. He does not have a sickly appearance. He does not appear ill. No distress.   HENT:   Head: Normocephalic and atraumatic.   Right Ear: External ear normal.   Left Ear: External ear normal.   Nose: Nose normal.   Mouth/Throat: Oropharynx is clear and moist. No oropharyngeal exudate.   Eyes: Pupils are equal, round, and reactive to light. Conjunctivae and EOM are normal. Right eye exhibits no discharge. Left eye exhibits no discharge. No scleral icterus.   Neck: Normal range of motion. Neck supple. No JVD present. No tracheal deviation present. No thyromegaly present.   Cardiovascular: Normal rate, normal heart sounds and intact distal pulses. Exam reveals no gallop and no friction rub.   No murmur heard.  Pulmonary/Chest: Effort normal and breath sounds normal. No stridor. No respiratory distress. He has no wheezes. He has no rales. He exhibits no tenderness.   Abdominal: Soft. Bowel sounds are normal. He exhibits no distension and no mass. There is no  tenderness. There is no rebound and no guarding. No hernia.   Genitourinary: Rectum normal, prostate normal, testes normal and penis normal. Rectal exam shows guaiac negative stool. Cremasteric reflex is present. Right testis shows no mass, no swelling and no tenderness. Right testis is descended. Cremasteric reflex is not absent on the right side. Left testis shows no mass, no swelling and no tenderness. Left testis is descended. Cremasteric reflex is not absent on the left side. Circumcised. No phimosis, paraphimosis, hypospadias, penile erythema or penile tenderness. No discharge found.   Musculoskeletal: He exhibits no edema, tenderness or deformity.   Lymphadenopathy:     He has no cervical adenopathy.   Neurological: He is alert and oriented to person, place, and time. He has normal reflexes. He displays normal reflexes. No cranial nerve deficit or sensory deficit. He exhibits normal muscle tone. Coordination normal.   Skin: Skin is warm and dry. Capillary refill takes less than 2 seconds. No rash noted. He is not diaphoretic. No erythema. No pallor.   Skin survey head neck and back negative for new lesions   Psychiatric: He has a normal mood and affect. His behavior is normal. Judgment and thought content normal.   Nursing note and vitals reviewed.      Assessment:       1. Well adult exam    2. Immunization due patient will get a flu shot today but is not getting the shingles because of coverage issues.   3. Need for influenza vaccination    4. Essential hypertension    5. Multiple-type hyperlipidemia    6. Overweight    7. Obstructive sleep apnea syndrome        Plan:       Patrice was seen today for annual exam.    Diagnoses and all orders for this visit:    Well adult exam  -     Ambulatory referral to Ophthalmology    Immunization due    Need for influenza vaccination  -     Influenza - Quadrivalent (PF)    Essential hypertension    Multiple-type hyperlipidemia    Overweight    Obstructive sleep apnea  syndrome    Other orders  -     Cancel: varicella-zoster [id number], PF, (SHINGRIX, PF,) 50 mcg/0.5 mL injection; Inject 0.5 mLs into the muscle once. for 1 dose  -     metoprolol succinate (TOPROL-XL) 50 MG 24 hr tablet; Take 1 tablet (50 mg total) by mouth once daily.  -     aspirin 81 MG Chew; Take 1 tablet (81 mg total) by mouth once daily.         Follow up in about 6 months (around 4/11/2020), or BP check 2 weeks, for follow up HTN.

## 2019-10-18 ENCOUNTER — PATIENT MESSAGE (OUTPATIENT)
Dept: FAMILY MEDICINE | Facility: CLINIC | Age: 59
End: 2019-10-18

## 2019-10-18 DIAGNOSIS — I10 ESSENTIAL HYPERTENSION: Primary | ICD-10-CM

## 2019-10-18 RX ORDER — AMLODIPINE BESYLATE 5 MG/1
5 TABLET ORAL DAILY
Qty: 30 TABLET | Refills: 11 | Status: SHIPPED | OUTPATIENT
Start: 2019-10-18 | End: 2020-07-13

## 2019-10-18 NOTE — TELEPHONE ENCOUNTER
Yes.  The resting heart rate is rather slow I will change the metoprolol to amlodipine which should not slow the heart rate significantly.  We checked a blood pressure in 2 weeks for follow-up.

## 2019-10-23 DIAGNOSIS — E78.2 MULTIPLE-TYPE HYPERLIPIDEMIA: ICD-10-CM

## 2019-10-23 RX ORDER — ROSUVASTATIN CALCIUM 10 MG/1
TABLET, COATED ORAL
Qty: 90 TABLET | Refills: 5 | Status: SHIPPED | OUTPATIENT
Start: 2019-10-23 | End: 2021-04-16

## 2019-11-04 RX ORDER — PANTOPRAZOLE SODIUM 40 MG/1
TABLET, DELAYED RELEASE ORAL
Qty: 90 TABLET | Refills: 4 | Status: SHIPPED | OUTPATIENT
Start: 2019-11-04 | End: 2020-11-15

## 2019-11-22 ENCOUNTER — OFFICE VISIT (OUTPATIENT)
Dept: FAMILY MEDICINE | Facility: CLINIC | Age: 59
End: 2019-11-22
Payer: OTHER GOVERNMENT

## 2019-11-22 ENCOUNTER — TELEPHONE (OUTPATIENT)
Dept: FAMILY MEDICINE | Facility: CLINIC | Age: 59
End: 2019-11-22

## 2019-11-22 VITALS
OXYGEN SATURATION: 99 % | SYSTOLIC BLOOD PRESSURE: 132 MMHG | DIASTOLIC BLOOD PRESSURE: 72 MMHG | WEIGHT: 173.63 LBS | HEIGHT: 64 IN | BODY MASS INDEX: 29.64 KG/M2 | HEART RATE: 68 BPM | TEMPERATURE: 98 F

## 2019-11-22 DIAGNOSIS — J01.40 ACUTE NON-RECURRENT PANSINUSITIS: Primary | ICD-10-CM

## 2019-11-22 PROCEDURE — 99213 PR OFFICE/OUTPT VISIT, EST, LEVL III, 20-29 MIN: ICD-10-PCS | Mod: 25,S$PBB,, | Performed by: FAMILY MEDICINE

## 2019-11-22 PROCEDURE — 99213 OFFICE O/P EST LOW 20 MIN: CPT | Mod: 25,S$PBB,, | Performed by: FAMILY MEDICINE

## 2019-11-22 PROCEDURE — 99214 OFFICE O/P EST MOD 30 MIN: CPT | Performed by: FAMILY MEDICINE

## 2019-11-22 RX ORDER — METHYLPREDNISOLONE ACETATE 40 MG/ML
40 INJECTION, SUSPENSION INTRA-ARTICULAR; INTRALESIONAL; INTRAMUSCULAR; SOFT TISSUE
Status: DISCONTINUED | OUTPATIENT
Start: 2019-11-22 | End: 2019-11-22

## 2019-11-22 RX ORDER — AZITHROMYCIN 250 MG/1
250 TABLET, FILM COATED ORAL DAILY
Qty: 6 TABLET | Refills: 0 | Status: SHIPPED | OUTPATIENT
Start: 2019-11-22 | End: 2019-11-28

## 2019-11-22 RX ORDER — PROMETHAZINE HYDROCHLORIDE AND DEXTROMETHORPHAN HYDROBROMIDE 6.25; 15 MG/5ML; MG/5ML
5 SYRUP ORAL NIGHTLY
Qty: 180 ML | Refills: 2 | Status: SHIPPED | OUTPATIENT
Start: 2019-11-22 | End: 2019-12-02

## 2019-11-22 NOTE — PROGRESS NOTES
Subjective:       Patient ID: Patrice Smith is a 59 y.o. male.    Chief Complaint: Nasal Congestion (chest congestion)      Started as a sinus congestion has progressed into a chest cold and chest congestion.    Cough   This is a new problem. The current episode started in the past 7 days (Started 2 weeks ago). The problem has been waxing and waning. The problem occurs hourly. The cough is productive of sputum (Clear thick sputum). Associated symptoms include nasal congestion, postnasal drip and rhinorrhea. Pertinent negatives include no chest pain, chills, ear congestion, ear pain, fever, headaches, heartburn, hemoptysis, myalgias, rash, sore throat, shortness of breath, sweats, weight loss or wheezing. Nothing aggravates the symptoms. He has tried nothing for the symptoms. The treatment provided moderate relief. His past medical history is significant for bronchitis, environmental allergies and pneumonia ( age 4). There is no history of asthma, bronchiectasis, COPD or emphysema.       Allergies and Medications:   Review of patient's allergies indicates:  No Known Allergies  Current Outpatient Medications   Medication Sig Dispense Refill    amLODIPine (NORVASC) 5 MG tablet Take 1 tablet (5 mg total) by mouth once daily. 30 tablet 11    aspirin 81 MG Chew Take 1 tablet (81 mg total) by mouth once daily.  0    fluticasone propionate (FLONASE) 50 mcg/actuation nasal spray USE 1 SPRAY IN EACH NOSTRIL TWICE A DAY AS NEEDED 48 g 5    multivitamin capsule Take 1 capsule by mouth once daily.        pantoprazole (PROTONIX) 40 MG tablet TAKE 1 TABLET DAILY 90 tablet 4    rosuvastatin (CRESTOR) 10 MG tablet TAKE 1 TABLET DAILY 90 tablet 5    tadalafil (CIALIS) 20 MG Tab Take 1 tablet (20 mg total) by mouth twice a week. As needed 30 tablet 3    azithromycin (Z-CONNER) 250 MG tablet Take 1 tablet (250 mg total) by mouth once daily. po on day 1 then 1 tab po on days 2-5 for 6 doses 6 tablet 0     promethazine-dextromethorphan (PROMETHAZINE-DM) 6.25-15 mg/5 mL Syrp Take 5 mLs by mouth every evening. for 10 days 180 mL 2     Current Facility-Administered Medications   Medication Dose Route Frequency Provider Last Rate Last Dose    methylPREDNISolone acetate injection 40 mg  40 mg Intramuscular 1 time in Clinic/HOD Ant Gallo MD           Family History:   Family History   Problem Relation Age of Onset    Parkinsonism Mother     Hypertension Mother     Ulcers Father     Arthritis Father     Diabetes Father     Hypertension Brother     Cancer Brother     Diabetes Brother     Anesthesia problems Neg Hx     Clotting disorder Neg Hx        Social History:   Social History     Socioeconomic History    Marital status:      Spouse name: Not on file    Number of children: Not on file    Years of education: Not on file    Highest education level: Not on file   Occupational History    Occupation: Warehouse Mgr.    Social Needs    Financial resource strain: Not on file    Food insecurity:     Worry: Not on file     Inability: Not on file    Transportation needs:     Medical: Not on file     Non-medical: Not on file   Tobacco Use    Smoking status: Never Smoker    Smokeless tobacco: Never Used   Substance and Sexual Activity    Alcohol use: Yes     Comment: social    Drug use: No    Sexual activity: Not on file   Lifestyle    Physical activity:     Days per week: Not on file     Minutes per session: Not on file    Stress: Not at all   Relationships    Social connections:     Talks on phone: Not on file     Gets together: Not on file     Attends Jewish service: Not on file     Active member of club or organization: Not on file     Attends meetings of clubs or organizations: Not on file     Relationship status: Not on file   Other Topics Concern    Not on file   Social History Narrative    Not on file       Review of Systems   Constitutional: Negative for chills, fever and weight  loss.   HENT: Positive for postnasal drip and rhinorrhea. Negative for ear pain and sore throat.    Respiratory: Positive for cough. Negative for hemoptysis, shortness of breath and wheezing.    Cardiovascular: Negative for chest pain.   Gastrointestinal: Negative for heartburn.   Musculoskeletal: Negative for myalgias.   Skin: Negative for rash.   Allergic/Immunologic: Positive for environmental allergies.   Neurological: Negative for headaches.       Objective:     Vitals:    11/22/19 0857   BP: 132/72   Pulse: 68   Temp: 98 °F (36.7 °C)        Physical Exam   Constitutional: He appears well-developed and well-nourished. No distress.   HENT:   Head: Normocephalic and atraumatic.   Right Ear: Hearing, tympanic membrane, external ear and ear canal normal. No drainage, swelling or tenderness. No foreign bodies. Tympanic membrane is not injected, not scarred, not perforated, not erythematous, not retracted and not bulging. Tympanic membrane mobility is normal. No middle ear effusion. No hemotympanum. No decreased hearing is noted.   Left Ear: Hearing, tympanic membrane, external ear and ear canal normal. No drainage, swelling or tenderness. No foreign bodies. Tympanic membrane is not injected, not scarred, not perforated, not erythematous, not retracted and not bulging. Tympanic membrane mobility is normal.  No middle ear effusion. No hemotympanum. No decreased hearing is noted.   Nose: Nose normal. No mucosal edema, rhinorrhea, nose lacerations, sinus tenderness, nasal deformity or septal deviation. Right sinus exhibits no maxillary sinus tenderness and no frontal sinus tenderness. Left sinus exhibits no maxillary sinus tenderness and no frontal sinus tenderness.   Mouth/Throat: Uvula is midline and oropharynx is clear and moist. Normal dentition. No oropharyngeal exudate, posterior oropharyngeal edema, posterior oropharyngeal erythema or tonsillar abscesses.   Eyes: Pupils are equal, round, and reactive to light.  Conjunctivae and EOM are normal. Right eye exhibits no discharge. Left eye exhibits no discharge. No scleral icterus.   Neck: Normal range of motion. Neck supple. No thyromegaly present.   Cardiovascular: Normal rate, regular rhythm, normal heart sounds and intact distal pulses. Exam reveals no gallop and no friction rub.   No murmur heard.  Pulmonary/Chest: Effort normal and breath sounds normal. No stridor. No respiratory distress. He has no wheezes. He has no rales. He exhibits no tenderness.   Lymphadenopathy:     He has no cervical adenopathy.   Skin: He is not diaphoretic.   Nursing note and vitals reviewed.      Assessment:       1. Acute non-recurrent pansinusitis        Plan:       Patrice was seen today for nasal congestion.    Diagnoses and all orders for this visit:    Acute non-recurrent pansinusitis  -     promethazine-dextromethorphan (PROMETHAZINE-DM) 6.25-15 mg/5 mL Syrp; Take 5 mLs by mouth every evening. for 10 days  -     azithromycin (Z-CONNER) 250 MG tablet; Take 1 tablet (250 mg total) by mouth once daily. po on day 1 then 1 tab po on days 2-5 for 6 doses  -     methylPREDNISolone acetate injection 40 mg         Follow up if symptoms worsen or fail to improve.

## 2020-01-13 PROBLEM — Z00.00 WELL ADULT EXAM: Status: RESOLVED | Noted: 2019-10-11 | Resolved: 2020-01-13

## 2020-02-03 NOTE — PROGRESS NOTES
CHIEF COMPLAINT:    Mr. Smith is a 59 y.o. male presenting for a follow up on family history of prostate cancer (brother) and erectile dysfunction.    PRESENTING ILLNESS:    Patrice Smith is a 59 y.o. male who returns for follow up.  He states he is doing well.  Continues to use the Cialis and is successful. He uses the 20 mg cuts it in half.  No new diagnoses or surgeries since the last time he was seen.    No voiding complaints.     REVIEW OF SYSTEMS:    Review of Systems   Constitutional: Negative.    HENT: Negative.    Eyes: Negative.    Respiratory: Negative.    Cardiovascular: Negative.    Gastrointestinal: Negative.    Genitourinary: Negative.    Musculoskeletal: Negative.    Skin: Negative.    Neurological: Negative.    Endo/Heme/Allergies: Negative.    Psychiatric/Behavioral: Negative.        PATIENT HISTORY:    Past Medical History:   Diagnosis Date    Acid reflux     Hyperlipidemia     Hypertension        No past surgical history on file.    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    Tobacco Use    Smoking status: Never Smoker    Smokeless tobacco: Never Used   Substance and Sexual Activity    Alcohol use: Yes     Comment: social    Drug use: No    Sexual activity: Not on file       Allergies:  Patient has no known allergies.    Medications:  Outpatient Encounter Medications as of 2/7/2020   Medication Sig Dispense Refill    amLODIPine (NORVASC) 5 MG tablet Take 1 tablet (5 mg total) by mouth once daily. 30 tablet 11    aspirin 81 MG Chew Take 1 tablet (81 mg total) by mouth once daily.  0    fluticasone propionate (FLONASE) 50 mcg/actuation nasal spray USE 1 SPRAY IN EACH NOSTRIL TWICE A DAY AS  NEEDED 48 g 5    multivitamin capsule Take 1 capsule by mouth once daily.        pantoprazole (PROTONIX) 40 MG tablet TAKE 1 TABLET DAILY 90 tablet 4    rosuvastatin (CRESTOR) 10 MG tablet TAKE 1 TABLET DAILY 90 tablet 5    tadalafil (CIALIS) 20 MG Tab Take 1 tablet (20 mg total) by mouth twice a week. As needed 30 tablet 3     Facility-Administered Encounter Medications as of 2/7/2020   Medication Dose Route Frequency Provider Last Rate Last Dose    methylPREDNISolone sod suc(PF) injection 40 mg  40 mg Intramuscular 1 time in Clinic/HOD Ant Gallo MD             PHYSICAL EXAMINATION:    The patient generally appears in good health, is appropriately interactive, and is in no apparent distress.    Skin: No lesions.    Mental: Cooperative with normal affect.    Neuro: Grossly intact.    HEENT: Normal. No evidence of lymphadenopathy.    Chest:  normal inspiratory effort.    Abdomen: Soft, non-tender. No masses or organomegaly. Bladder is not palpable. No evidence of flank discomfort. No evidence of inguinal hernia.    Extremities: No clubbing, cyanosis, or edema    Scrotum showed no rashes or lesions. Testicles showed no masses or tenderness.  Epididymis showed no masses or tenderness.  Penis was circumcised. No meatal stenosis. No penile discharge.  No inguinal hernias.  No inguinal lymphadenopathy.    CRISTÓBAL:  30g prostate without nodularity, central sulcus preserved.  Normal rectal tone, no anal masses    LABS:    Lab Results   Component Value Date    BUN 21 10/07/2019    CREATININE 1.12 10/07/2019     PSA on 4/2/2019 was 1.6 ng/ml (showing up on our labs but the system will not pull it into the note)  Lab Results   Component Value Date    PSA 1.6 02/06/2014    PSA 1.72 11/19/2012    PSA 1.4 01/06/2011         IMPRESSION:    Encounter Diagnoses   Name Primary?    Other male erectile dysfunction Yes    Family history of prostate cancer        PLAN:    1.  Follow up in 1 year  2.  He will have his primary  get the PSA  3.  He will let me know when he needs a refill on the Cialis.  Information for [employer] was provided for pricing and their general info.

## 2020-02-07 ENCOUNTER — OFFICE VISIT (OUTPATIENT)
Dept: UROLOGY | Facility: CLINIC | Age: 60
End: 2020-02-07
Payer: OTHER GOVERNMENT

## 2020-02-07 VITALS
BODY MASS INDEX: 30.14 KG/M2 | HEART RATE: 45 BPM | SYSTOLIC BLOOD PRESSURE: 160 MMHG | DIASTOLIC BLOOD PRESSURE: 76 MMHG | WEIGHT: 176.56 LBS | HEIGHT: 64 IN

## 2020-02-07 DIAGNOSIS — N52.8 OTHER MALE ERECTILE DYSFUNCTION: Primary | ICD-10-CM

## 2020-02-07 DIAGNOSIS — Z80.42 FAMILY HISTORY OF PROSTATE CANCER: ICD-10-CM

## 2020-02-07 PROCEDURE — 99213 OFFICE O/P EST LOW 20 MIN: CPT | Mod: PBBFAC | Performed by: UROLOGY

## 2020-02-07 PROCEDURE — 99213 OFFICE O/P EST LOW 20 MIN: CPT | Mod: S$PBB,,, | Performed by: UROLOGY

## 2020-02-07 PROCEDURE — 99999 PR PBB SHADOW E&M-EST. PATIENT-LVL III: CPT | Mod: PBBFAC,,, | Performed by: UROLOGY

## 2020-02-07 PROCEDURE — 99999 PR PBB SHADOW E&M-EST. PATIENT-LVL III: ICD-10-PCS | Mod: PBBFAC,,, | Performed by: UROLOGY

## 2020-02-07 PROCEDURE — 99213 PR OFFICE/OUTPT VISIT, EST, LEVL III, 20-29 MIN: ICD-10-PCS | Mod: S$PBB,,, | Performed by: UROLOGY

## 2020-02-24 PROBLEM — J01.40 ACUTE NON-RECURRENT PANSINUSITIS: Status: RESOLVED | Noted: 2019-11-22 | Resolved: 2020-02-24

## 2020-04-20 ENCOUNTER — PATIENT MESSAGE (OUTPATIENT)
Dept: FAMILY MEDICINE | Facility: CLINIC | Age: 60
End: 2020-04-20

## 2020-04-23 ENCOUNTER — PATIENT MESSAGE (OUTPATIENT)
Dept: FAMILY MEDICINE | Facility: CLINIC | Age: 60
End: 2020-04-23

## 2020-05-01 ENCOUNTER — TELEPHONE (OUTPATIENT)
Dept: FAMILY MEDICINE | Facility: CLINIC | Age: 60
End: 2020-05-01

## 2020-05-05 ENCOUNTER — OFFICE VISIT (OUTPATIENT)
Dept: FAMILY MEDICINE | Facility: CLINIC | Age: 60
End: 2020-05-05
Payer: OTHER GOVERNMENT

## 2020-05-05 VITALS
RESPIRATION RATE: 16 BRPM | SYSTOLIC BLOOD PRESSURE: 170 MMHG | DIASTOLIC BLOOD PRESSURE: 88 MMHG | OXYGEN SATURATION: 98 % | BODY MASS INDEX: 28.7 KG/M2 | WEIGHT: 168.13 LBS | HEART RATE: 58 BPM | TEMPERATURE: 98 F | HEIGHT: 64 IN

## 2020-05-05 DIAGNOSIS — I10 ESSENTIAL HYPERTENSION: ICD-10-CM

## 2020-05-05 DIAGNOSIS — S16.1XXA STRAIN OF NECK MUSCLE, INITIAL ENCOUNTER: Primary | ICD-10-CM

## 2020-05-05 PROCEDURE — 99214 OFFICE O/P EST MOD 30 MIN: CPT | Mod: S$PBB,,, | Performed by: FAMILY MEDICINE

## 2020-05-05 PROCEDURE — 99214 OFFICE O/P EST MOD 30 MIN: CPT | Performed by: FAMILY MEDICINE

## 2020-05-05 PROCEDURE — 99214 PR OFFICE/OUTPT VISIT, EST, LEVL IV, 30-39 MIN: ICD-10-PCS | Mod: S$PBB,,, | Performed by: FAMILY MEDICINE

## 2020-05-05 RX ORDER — IRBESARTAN 150 MG/1
150 TABLET ORAL DAILY
Qty: 90 TABLET | Refills: 3 | Status: SHIPPED | OUTPATIENT
Start: 2020-05-05 | End: 2020-07-27 | Stop reason: SDUPTHER

## 2020-05-05 RX ORDER — CYCLOBENZAPRINE HCL 10 MG
10 TABLET ORAL NIGHTLY
Qty: 30 TABLET | Refills: 0 | Status: SHIPPED | OUTPATIENT
Start: 2020-05-05 | End: 2020-06-05

## 2020-05-05 NOTE — PATIENT INSTRUCTIONS
Patient is here for follow-up on blood pressure his pressure is elevated today and he has been having elevations at home in the past.  Blood pressure has been running at home in the 140-150 range with diastolics in the 80-90 range.  He does exercise regularly and gets his heart rate up into the 150 range with exercise resting heart rate is the in the 40-50 range.  Having neck pain and left side for approximately 2 months hurts on contralateral flexion does radiate down into the shoulder.    Ice Massage    Fill a dozen 6 oz. Paper cups with water and freeze them. When ready for massage, peel the paper from one frozen cup and wet it. Patient lies on stomach while the masseur rolls the ice cylinder over the sore tight muscles. When the patient can no longer tolerate the massage (usually 5-10 minutes), discard the ice. Patient lies prone for another five minutes to allow warming and blood flow into the muscles. Repeat 2-3 times per day.

## 2020-05-05 NOTE — PROGRESS NOTES
Subjective:       Patient ID: Patrice Smith is a 60 y.o. male.    Chief Complaint: No chief complaint on file.      Hypertension   This is a recurrent problem. The current episode started more than 1 year ago. The problem has been waxing and waning since onset. The problem is resistant. Associated symptoms include anxiety and neck pain. Pertinent negatives include no blurred vision, chest pain, headaches, malaise/fatigue, orthopnea, palpitations, peripheral edema, PND, shortness of breath or sweats. Risk factors for coronary artery disease include dyslipidemia and stress. Past treatments include nothing. Compliance problems include psychosocial issues.        Allergies and Medications:   Review of patient's allergies indicates:  No Known Allergies  Current Outpatient Medications   Medication Sig Dispense Refill    amLODIPine (NORVASC) 5 MG tablet Take 1 tablet (5 mg total) by mouth once daily. 30 tablet 11    aspirin 81 MG Chew Take 1 tablet (81 mg total) by mouth once daily.  0    fluticasone propionate (FLONASE) 50 mcg/actuation nasal spray USE 1 SPRAY IN EACH NOSTRIL TWICE A DAY AS NEEDED 48 g 5    multivitamin capsule Take 1 capsule by mouth once daily.        pantoprazole (PROTONIX) 40 MG tablet TAKE 1 TABLET DAILY 90 tablet 4    rosuvastatin (CRESTOR) 10 MG tablet TAKE 1 TABLET DAILY 90 tablet 5    tadalafil (CIALIS) 20 MG Tab Take 1 tablet (20 mg total) by mouth twice a week. As needed 30 tablet 3    vitamin A-vit C-vit E-zinc-Cu Tab Take by mouth.      cyclobenzaprine (FLEXERIL) 10 MG tablet Take 1 tablet (10 mg total) by mouth every evening. 30 tablet 0    irbesartan (AVAPRO) 150 MG tablet Take 1 tablet (150 mg total) by mouth once daily. 90 tablet 3     Current Facility-Administered Medications   Medication Dose Route Frequency Provider Last Rate Last Dose    methylPREDNISolone sod suc(PF) injection 40 mg  40 mg Intramuscular 1 time in Clinic/HOD Ant Gallo MD           Family History:    Family History   Problem Relation Age of Onset    Parkinsonism Mother     Hypertension Mother     Ulcers Father     Arthritis Father     Diabetes Father     Hypertension Brother     Cancer Brother     Diabetes Brother     Anesthesia problems Neg Hx     Clotting disorder Neg Hx        Social History:   Social History     Socioeconomic History    Marital status:      Spouse name: Not on file    Number of children: Not on file    Years of education: Not on file    Highest education level: Not on file   Occupational History    Occupation: Warehouse Mgr.    Social Needs    Financial resource strain: Not hard at all    Food insecurity:     Worry: Never true     Inability: Never true    Transportation needs:     Medical: No     Non-medical: No   Tobacco Use    Smoking status: Never Smoker    Smokeless tobacco: Never Used   Substance and Sexual Activity    Alcohol use: Yes     Frequency: 2-4 times a month     Drinks per session: 3 or 4     Binge frequency: Less than monthly     Comment: social    Drug use: No    Sexual activity: Not on file   Lifestyle    Physical activity:     Days per week: 5 days     Minutes per session: 20 min    Stress: To some extent   Relationships    Social connections:     Talks on phone: Never     Gets together: Once a week     Attends Yarsanism service: Not on file     Active member of club or organization: Yes     Attends meetings of clubs or organizations: More than 4 times per year     Relationship status:    Other Topics Concern    Not on file   Social History Narrative    Not on file       Review of Systems   Constitutional: Negative for malaise/fatigue.   Eyes: Negative for blurred vision.   Respiratory: Negative for shortness of breath.    Cardiovascular: Negative for chest pain, palpitations, orthopnea and PND.   Musculoskeletal: Positive for neck pain.   Neurological: Negative for headaches.       Objective:     Vitals:    05/05/20 0933   BP:  (!) 170/88   Pulse: (!) 58   Resp: 16   Temp: 97.9 °F (36.6 °C)        Physical Exam   Constitutional: He appears well-developed and well-nourished.   HENT:   Head: Normocephalic.   Eyes: Pupils are equal, round, and reactive to light. Conjunctivae and EOM are normal.   Cardiovascular: Normal rate, regular rhythm, normal heart sounds and intact distal pulses. Exam reveals no gallop and no friction rub.   No murmur heard.  Pulmonary/Chest: Effort normal and breath sounds normal. No stridor. No respiratory distress. He has no wheezes. He has no rales. He exhibits no tenderness.   Musculoskeletal:        Back:    Skin: Skin is warm and dry. He is not diaphoretic.   Psychiatric: He has a normal mood and affect. His behavior is normal. Judgment and thought content normal.   Nursing note and vitals reviewed.      Assessment:       1. Strain of neck muscle, initial encounter    2. Essential hypertension        Plan:       Diagnoses and all orders for this visit:    Strain of neck muscle, initial encounter  -     Ambulatory referral/consult to Physical/Occupational Therapy; Future  -     cyclobenzaprine (FLEXERIL) 10 MG tablet; Take 1 tablet (10 mg total) by mouth every evening.    Essential hypertension  -     irbesartan (AVAPRO) 150 MG tablet; Take 1 tablet (150 mg total) by mouth once daily.         Follow up in about 1 month (around 6/5/2020), or if symptoms worsen or fail to improve.

## 2020-06-04 ENCOUNTER — CLINICAL SUPPORT (OUTPATIENT)
Dept: REHABILITATION | Facility: HOSPITAL | Age: 60
End: 2020-06-04
Attending: FAMILY MEDICINE
Payer: OTHER GOVERNMENT

## 2020-06-04 DIAGNOSIS — M54.2 NECK PAIN ON LEFT SIDE: Primary | ICD-10-CM

## 2020-06-04 DIAGNOSIS — S16.1XXA STRAIN OF NECK MUSCLE, INITIAL ENCOUNTER: ICD-10-CM

## 2020-06-04 PROCEDURE — 97161 PT EVAL LOW COMPLEX 20 MIN: CPT

## 2020-06-04 NOTE — PLAN OF CARE
[facility] OUTPATIENT THERAPY  Physical Therapy Initial Evaluation    Name: Patrice Smith  Clinic Number: [id number]    Therapy Diagnosis:   Encounter Diagnoses   Name Primary?    Strain of neck muscle, initial encounter     Neck pain on left side Yes     Physician: Ant Gallo MD    Physician Orders: PT Eval and Treat   Medical Diagnosis from Referral: Strain of neck muscle, initial encounter    Evaluation Date: 6/4/2020  Authorization Period Expiration: 12/31/2020  Plan of Care Expiration: 7/31/2020  Visit # / Visits authorized: 1/ 104 (Freq 3w2, 2w4)    Total visit count: 1    Precautions: Standard    Subjective     Date of onset: 02/2020    History of current condition - Temo reports: That sometime in February 2020 he had an insidious onset of neck pain that goes down to my L shoulder blade.  He was able to have an MD consult on 5/5/2020 and was Dx with a Strin of his neck mm. As was referred to OPPT for eval  He adds that he has disturbed sleep with a freq of twice a week, has difficulty with driving, work related tasks with forklift, ADLs - showering, grooming dressing, reaching and lifting as well.          Medical History:   Past Medical History:   Diagnosis Date    Acid reflux     Hyperlipidemia     Hypertension        Surgical History:   Patrice Smith  has no past surgical history on file.    Medications:   Patrice has a current medication list which includes the following prescription(s): amlodipine, aspirin, cyclobenzaprine, fluticasone propionate, irbesartan, multivitamin, pantoprazole, rosuvastatin, tadalafil, and vitamin a-vit c-vit e-zinc-cu, and the following Facility-Administered Medications: methylprednisolone sod suc(pf).    Allergies:   Review of patient's allergies indicates:  No Known Allergies     Imaging, none: which revealed the following:N/A    Prior Therapy: N/a  Social History:  lives with their spouse  in a single story elevated home with ~ 16 steps and B HR  "with ascending.    Occupation:   Prior Level of Function: Independent  Current Level of Function: Mod I with pain and compensatory movements.      Pain:  Current 5/10, worst 6/10, best 3/10   Location: left neck  and shoulder   Description: Aching, Tingling, Electric, Shooting and Variable  Aggravating Factors: Lifting  Easing Factors: pain medication, ice and heating pad    Pts goals:  "I want to get rid of this or manage it".    Objective     Posture: Sits with forward head posture    C-Spine     Palpation / Observation:      Palpation tenderness to: L suboccipitals, levator and UT.  B scapular protracted and laterally rotated.            Flexibility      Muscle Left Right Comment         Upper Trapezius Mod Tightness Mod Tightness    Levator Mod Tightness Mod Tightness    Pectoralis minor Mod Tightness Mod Tightness    Anterior Scalenes Normal Normal    Middle Scalenes Normal Normal    Posterior Scalenes Mod Tightness Mod Tightness                      ROM       C-Spine AROM AROM  Status Comment    Left Right              Flexion 55 * - *      Extension 45 * - *      Sidebending 20 * pain 30 *pain      Rotation 35 * 53 *                                            MMT      C-Spine   Status Comment    Left Right            Flexion 3+/5 -     Extension 4-/5 -     Sidebending 3+/5 pain 3+/5     Rotation 3+/5 3+/5                   Lower Trapezius 3+/5 pain 3+/5     Middle Trapezius 3+/5 pain 4-/5     Rhomboids 3+/5 pain 4-/5     Teres Minor 4+/5 4+/            Shoulder flexion 4/5 4/5     Shoulder Abduction 4/5 4/5     Shoulder Extension 4+/5 4+/5     Shoulder Internal Rotation 4+/5 4+/5     Shoulder External Rotation 4+/5 4+/5                          Joint Mobility:       hypomobile to C4-6       SPECIAL TESTS             C-Spine   Status Comment    Left Right            Compression Test Positive. Negative.     Distraction Test Negative. Negative.                                      "            FUNCTIONAL TESTS       Neck Disability Index: 11/50 = 22% Disability  UEFS: 69/80 = 86.25%          FUNCTIONAL MOBILITY        Balance: No gross abnormalities        Gait:  Patient amb into clinic with no AD good velocity/clemecnia step/stride length good transitional balance, heel strike, toe off, TKE, and hip ext and no gross abnormalities noted.          Transfer: Patient performed sit<>stand T/F from standard height arm chair and mat Mod I/I.          Time In: 1530  Time Out: 1620    Assessment     Patrice is a 60 y.o. male  with a forward head posture and is presenting to OP Physical Therapy for initial evaluation on 6/4/2020 with a MD Dx of Strain of neck muscle, initial encounter  Patient exhibits L sided cervical pain that radiates to is L scapula at his levator insertion.  Additionally, he has decreased flexibility of his pec minors, and scalenes.  Also with noted increased mm guarding to his L UT, levator, and suboccipitals.  He demonstrates periscapular and cervical mm weakness along with decreased cervical ROM that is painful.  All of which is consistent with his postural dysfunction.  He currently reports a 22% disability and 86.25% level of function as evidenced by the NDI and UEFS respectively.  His current deficits contribute to his limited function that includes but isnot limited to disturbed sleep with a freq of twice a week, difficulty with driving, work related tasks with forklift, ADLs - showering, grooming dressing, reaching and lifting as well. The following co-morbid conditions/personal factors may affect the care plan:HTN may limit his ability to heal and recover optimally.  The patient's clinical characteristics are evolving.   Patient would benefit from skilled Physical Therapy to address his noted deficits.      Pt prognosis is Good.   Pt will benefit from skilled outpatient Physical Therapy to address the deficits stated above and in the chart below, provide  pt/family education, and to maximize pt's level of independence.     Plan of care discussed with patient: Yes  Pt's spiritual, cultural and educational needs considered and patient is agreeable to the plan of care and goals as stated below:     Goals  Patient Will: STG/LTG Status   1a demonstrate initial HEP with use of handout prn in order to optimize Rx outcomes and max. Functional mob.in 4 visits. STG    2a Relate cervical pain </= 3/10 at worst over the previous 5 days in order to improve cervical ROM for improved QoL, ADL's, as well as to facilitate ability to drive and perform work related fork lift duties in 7 visits.   STG    3a Patient will improve cervical ROM for       L SB to 25* (IE 20*),     L  rotation to 40* (IE 35*)   in order to facilitate improved ability to drive and perform work related fork lift duties in 7 visits.   STG    4a improve MMT grades for   B LT to 4-/5 (IE3+/5)   L MT to 4-/5 (IE 3+/5)   L Rhomboids to 4-/5 (IE 3+/5)   in order to improve/facilitate scapular stability and posture as well as UE support with lifting/reaching, ADLs- grooming, showering and day to day chores in 8 visits.   STG    5a Decrease disability </= 15% (IE 22%) as per the NDI in order to improve QoL, ADLs, and ability to lift and reach in 7 visits. STG    6a relate restful sleep with </= one incidence of disturbed sleep week in order to improve QoL, promote healing and function in 9 visits.   STG    7a relate improved UE function as evidenced by the UEFS to >/= 90% (IE 86.25%) in order to improve reaching/lifting tasks with his day to day  And work related duties in 8 visits.   STG              1b demonstrate final HEP with use of handout prn in order to optimize Rx outcomes and max. Functional mob.by discharge. LTG    2b Relate cervical pain </= 1/10 at worst over the previous 7 days in order to improve cervical ROM for improved QoL, ADL's, as well as to facilitate ability to drive and perform work related fork  lift duties in 14 visits.   LTG    3b  Patient will improve cervical ROM for    L SB to 30* (IE 20*),     L  rotation to 50* (IE 35*)   in order to facilitate improved ability to drive and perform work related fork lift duties in 14 visits.   LTG    4b improve MMT grades for     B LT to 4/5 (IE3+/5)   B MT to 4/5 (IE 3+/5)   B Rhomboids to 4/5 (IE 3+/5)   in order to improve/facilitate scapular stability and posture as well as UE support with lifting/reaching, ADLs- grooming, showering and day to day chores in 14 visits.   LTG    5b Decrease disability </= 5% (IE 22%) as per the NDI in order to improve QoL, ADLs  and ability to lift and reach in 14 visits. LTG    6b   relate restful sleep with no incidences of disturbed sleep over the previous week in order to improve QoL, promote healing and function in 14 visits. LTG    7b relate improved UE function as evidenced by the UEFS to >/= 95% (IE 86.25%) in order to improve reaching/lifting tasks with his day to day  And work related duties in 14 visits. LTG    8 relate imrpoved ability to drive with no exacerbation of pain over the previous week with looking over his B shoulders for improved safety and community mob. In 14 visits LTG                              Anticipated Barriers for therapy: None    Medical Necessity is demonstrated by the following  History  Co-morbidities and personal factors that may impact the plan of care Co-morbidities:   HTN    Personal Factors:   lifestyle     low   Examination  Body Structures and Functions, activity limitations and participation restrictions that may impact the plan of care Body Regions:   head  neck  upper extremities    Body Systems:    gross symmetry  ROM  strength  motor control    Participation Restrictions:   None    Activity limitations:   Learning and applying knowledge  no deficits    General Tasks and Commands  no deficits    Communication  no deficits    Mobility  lifting and carrying objects  driving (bike,  car, motorcycle)    Self care  washing oneself (bathing, drying, washing hands)  caring for body parts (brushing teeth, shaving, grooming)  dressing    Domestic Life  shopping  cooking  doing house work (cleaning house, washing dishes, laundry)    Interactions/Relationships  no deficits    Life Areas  employment    Community and Social Life  community life  recreation and leisure         moderate   Clinical Presentation evolving clinical presentation with changing clinical characteristics low   Decision Making/ Complexity Score: low         Plan       POC valid from 6/4/2020 through 7/31/2020. 3W2,2W4 for 14 visits, with treatments to consist of: Balance (88230): Improve overall coordination and balance, Cardiovascular, Closed Chain Strengthening, Core Stabilization, Flexibility (49550): improve muscle ROM, flexibility and  function, Home Exercise and Stretching, Patient Education, Plyometrics, Postural Awareness and  Training, Postural Stabilization, ROM Exercises (88901) : Passive or active activities to increase joint ROM, Strengthening  (81502): improve muscle strength and function., Therapeutic Exercise (79990): improve muscle strength, ROM, flexibility and muscle function., Gait Training (73623) : Improve overall gait function including stair climbing, Cross Friction Massage, Manual Stretching (70711): passive or active stretching to improve muscle length and function., Strain/Counter-Strain, Manual Traction, Myofascial Release , Peripheral Joint Mobilization, Soft Tissue Mobs (86820): increase ROM, tissue length, joint mechanics and modulate pain., Spine Mobilization  (99871): increase ROM, tissue length, joint mechanics and modulate pain., Massage (38687):, Combo E-Stim/Ultrasound, Cryotherapy (62502: Application of cold to decrease local swelling and decrease pain., Heat - 87371:  Application of heat to increase local circulation and decrease pain., Hi-Volt E-Stim  (): Application of  electrical stimulation to modulate pain., IFC E-Stim (): Application of electrical stimulation to modulate pain., Mechanical Traction (58301), Micro-Current, Premodulated E-Stim  (): Application of electrical stimulation to modulate pain., TENs E-Stim  (): Application of electrical stimulation to modulate pain., Ultrasound (89912): increase local circulation, improve tissue healing time and modulate pain., Whirlpool (20844): increase local tissue circulation, improve elasticity of tissues, increased blood flow for improved muscle strength, ROM, flexiblity, and function., NMES E-stim ():  Application of electrical stimulation for motor learning and control., Iontophoresis (81703), NMR (71843): re-education of movement, balance, coordination, kinesthetic sense, posture and proprioception, Self Care & Home Management (14870) - Self-care/home management training (e.g., activities of daily living [ADL] and compensatory training, meal preparation, safety procedures, and instructions in use of assistive technology devices/adaptive equipment), direct one-on-one contact (15 minutes), Therapeutic Activity (79542):  Use of dynamic activities to improve functional performance..        Bob Mercado, PT

## 2020-06-05 ENCOUNTER — OFFICE VISIT (OUTPATIENT)
Dept: FAMILY MEDICINE | Facility: CLINIC | Age: 60
End: 2020-06-05
Payer: OTHER GOVERNMENT

## 2020-06-05 VITALS
RESPIRATION RATE: 17 BRPM | BODY MASS INDEX: 27.45 KG/M2 | TEMPERATURE: 97 F | OXYGEN SATURATION: 99 % | DIASTOLIC BLOOD PRESSURE: 80 MMHG | HEIGHT: 64 IN | SYSTOLIC BLOOD PRESSURE: 130 MMHG | HEART RATE: 52 BPM | WEIGHT: 160.81 LBS

## 2020-06-05 DIAGNOSIS — I10 ESSENTIAL HYPERTENSION: ICD-10-CM

## 2020-06-05 DIAGNOSIS — R00.2 PALPITATION: Primary | ICD-10-CM

## 2020-06-05 LAB — EKG 12-LEAD: NORMAL

## 2020-06-05 PROCEDURE — 99214 OFFICE O/P EST MOD 30 MIN: CPT | Mod: 25 | Performed by: FAMILY MEDICINE

## 2020-06-05 PROCEDURE — 93005 ELECTROCARDIOGRAM TRACING: CPT | Mod: PBBFAC | Performed by: FAMILY MEDICINE

## 2020-06-05 PROCEDURE — 93010 ELECTROCARDIOGRAM REPORT: CPT | Mod: S$PBB,,, | Performed by: FAMILY MEDICINE

## 2020-06-05 PROCEDURE — 99214 OFFICE O/P EST MOD 30 MIN: CPT | Mod: 25,S$PBB,, | Performed by: FAMILY MEDICINE

## 2020-06-05 PROCEDURE — 93010 POCT EKG 12-LEAD: ICD-10-PCS | Mod: S$PBB,,, | Performed by: FAMILY MEDICINE

## 2020-06-05 PROCEDURE — 99214 PR OFFICE/OUTPT VISIT, EST, LEVL IV, 30-39 MIN: ICD-10-PCS | Mod: 25,S$PBB,, | Performed by: FAMILY MEDICINE

## 2020-06-05 NOTE — PROGRESS NOTES
Subjective:       Patient ID: Patrice Smith is a 60 y.o. male.    Chief Complaint: Hypertension (1 month follow up,also felt a flutter in throat, not sure if heart related)      Patient is here to follow-up on hypertension.  He also reports that has been having palpitations or fluttering sensation in the chest substernal at started 10 days ago and again on Thursday.  It does not occur with exercise but seems to occur more at rest.  BP Readings from Last 3 Encounters:  06/05/20 : (!) 140/78  05/05/20 : (!) 170/88  02/07/20 : (!) 160/76  Lab Results       Component                Value               Date                       WBC                      4.0                 04/02/2019                 HGB                      15.1                04/02/2019                 HCT                      44.7                04/02/2019                 PLT                      216                 04/02/2019                 CHOL                     103                 10/07/2019                 TRIG                     72                  10/07/2019                 HDL                      53                  10/07/2019                 ALT                      37                  10/07/2019                 AST                      25                  10/07/2019                 NA                       138                 10/07/2019                 K                        5.0                 10/07/2019                 CL                       100                 10/07/2019                 CREATININE               1.12                10/07/2019                 BUN                      21                  10/07/2019                 CO2                      23                  10/07/2019                 TSH                      5.020 (H)           10/07/2019                 PSA                      1.6                 02/06/2014                 HGBA1C                   5.4                 10/05/2018                 MICROALBUR                <3.0                04/02/2019                Hypertension   This is a recurrent problem. The current episode started more than 1 year ago. The problem has been gradually improving since onset. The problem is controlled. Pertinent negatives include no anxiety, blurred vision, chest pain, headaches, malaise/fatigue, neck pain, orthopnea, palpitations, peripheral edema, PND, shortness of breath or sweats. Agents associated with hypertension include NSAIDs. Risk factors for coronary artery disease include dyslipidemia and stress. Past treatments include nothing. The current treatment provides significant improvement. There are no compliance problems.    Palpitations    This is a new problem. Episode onset: 10 dd. The problem occurs intermittently. The problem has been gradually worsening. Pertinent negatives include no chest pain, malaise/fatigue or shortness of breath. He has tried nothing for the symptoms. The treatment provided moderate relief. Risk factors include dyslipidemia.       Allergies and Medications:   Review of patient's allergies indicates:  No Known Allergies  Current Outpatient Medications   Medication Sig Dispense Refill    amLODIPine (NORVASC) 5 MG tablet Take 1 tablet (5 mg total) by mouth once daily. 30 tablet 11    aspirin 81 MG Chew Take 1 tablet (81 mg total) by mouth once daily.  0    cyclobenzaprine (FLEXERIL) 10 MG tablet Take 1 tablet (10 mg total) by mouth every evening. 30 tablet 0    fluticasone propionate (FLONASE) 50 mcg/actuation nasal spray USE 1 SPRAY IN EACH NOSTRIL TWICE A DAY AS NEEDED 48 g 5    irbesartan (AVAPRO) 150 MG tablet Take 1 tablet (150 mg total) by mouth once daily. 90 tablet 3    multivitamin capsule Take 1 capsule by mouth once daily.        pantoprazole (PROTONIX) 40 MG tablet TAKE 1 TABLET DAILY 90 tablet 4    rosuvastatin (CRESTOR) 10 MG tablet TAKE 1 TABLET DAILY 90 tablet 5    tadalafil (CIALIS) 20 MG Tab Take 1 tablet (20 mg total) by mouth  twice a week. As needed 30 tablet 3    vitamin A-vit C-vit E-zinc-Cu Tab Take by mouth.       Current Facility-Administered Medications   Medication Dose Route Frequency Provider Last Rate Last Dose    methylPREDNISolone sod suc(PF) injection 40 mg  40 mg Intramuscular 1 time in Clinic/HOD Ant Gallo MD           Family History:   Family History   Problem Relation Age of Onset    Parkinsonism Mother     Hypertension Mother     Ulcers Father     Arthritis Father     Diabetes Father     Hypertension Brother     Cancer Brother     Diabetes Brother     Anesthesia problems Neg Hx     Clotting disorder Neg Hx        Social History:   Social History     Socioeconomic History    Marital status:      Spouse name: Not on file    Number of children: Not on file    Years of education: Not on file    Highest education level: Not on file   Occupational History    Occupation: [employer]    Social Needs    Financial resource strain: Not hard at all    Food insecurity:     Worry: Never true     Inability: Never true    Transportation needs:     Medical: No     Non-medical: No   Tobacco Use    Smoking status: Never Smoker    Smokeless tobacco: Never Used   Substance and Sexual Activity    Alcohol use: Yes     Frequency: 2-4 times a month     Drinks per session: 3 or 4     Binge frequency: Less than monthly     Comment: social    Drug use: No    Sexual activity: Not on file   Lifestyle    Physical activity:     Days per week: 5 days     Minutes per session: 20 min    Stress: To some extent   Relationships    Social connections:     Talks on phone: Never     Gets together: Once a week     Attends Shinto service: Not on file     Active member of club or organization: Yes     Attends meetings of clubs or organizations: More than 4 times per year     Relationship status:    Other Topics Concern    Not on file   Social History Narrative    Not on file       Review of Systems    Constitutional: Negative for malaise/fatigue.   Eyes: Negative for blurred vision.   Respiratory: Negative for shortness of breath.    Cardiovascular: Negative for chest pain, palpitations, orthopnea and PND.   Musculoskeletal: Negative for neck pain.   Neurological: Negative for headaches.     patient had a stress test 5 years ago negative  Objective:     Vitals:    06/05/20 1053   BP: 130/80   Pulse:    Resp:    Temp:         Physical Exam   Constitutional: He appears well-developed and well-nourished. No distress.   HENT:   Head: Normocephalic and atraumatic.   Eyes: Pupils are equal, round, and reactive to light. Conjunctivae and EOM are normal.   Cardiovascular: Normal rate, regular rhythm, normal heart sounds and intact distal pulses. Exam reveals no gallop and no friction rub.   No murmur heard.  Pulmonary/Chest: Effort normal and breath sounds normal. No stridor. No respiratory distress. He has no wheezes. He has no rales. He exhibits no tenderness.   Musculoskeletal: He exhibits no edema.   Skin: Skin is warm and dry. He is not diaphoretic.   Psychiatric: He has a normal mood and affect. His behavior is normal. Judgment and thought content normal.   Nursing note and vitals reviewed.    EKG shows a sinus bradycardia with early repolarization pattern no arrhythmias noted.  Assessment:       1. Palpitation uncertain etiology at this point.   2. Essential hypertension        Plan:       Patrice was seen today for hypertension.    Diagnoses and all orders for this visit:    Palpitation  -     POCT EKG 12-LEAD (NOT FOR [facility] USE)  -     Holter monitor - 24 hour; Future    Essential hypertension         Follow up in about 1 month (around 7/5/2020).

## 2020-06-10 ENCOUNTER — CLINICAL SUPPORT (OUTPATIENT)
Dept: CARDIOLOGY | Facility: HOSPITAL | Age: 60
End: 2020-06-10
Attending: FAMILY MEDICINE
Payer: OTHER GOVERNMENT

## 2020-06-10 DIAGNOSIS — R00.2 PALPITATION: ICD-10-CM

## 2020-06-10 PROCEDURE — 93225 XTRNL ECG REC<48 HRS REC: CPT

## 2020-06-12 ENCOUNTER — CLINICAL SUPPORT (OUTPATIENT)
Dept: REHABILITATION | Facility: HOSPITAL | Age: 60
End: 2020-06-12
Payer: OTHER GOVERNMENT

## 2020-06-12 DIAGNOSIS — M54.2 NECK PAIN ON LEFT SIDE: Primary | ICD-10-CM

## 2020-06-12 LAB
OHS CV EVENT MONITOR DAY: 0
OHS CV HOLTER LENGTH DECIMAL HOURS: 24
OHS CV HOLTER LENGTH HOURS: 24
OHS CV HOLTER LENGTH MINUTES: 0

## 2020-06-12 PROCEDURE — 97110 THERAPEUTIC EXERCISES: CPT

## 2020-06-12 PROCEDURE — 97012 MECHANICAL TRACTION THERAPY: CPT

## 2020-06-12 NOTE — PROGRESS NOTES
[facility] OUTPATIENT  Physical Therapy Daily Treatment Note     Name: Patrice Smith  Clinic Number: [id number]    Therapy Diagnosis:   Encounter Diagnosis   Name Primary?    Neck pain on left side Yes     Physician: Ant Gallo MD    Visit Date: 6/12/2020    Physician Orders: PT Eval and Treat   Medical Diagnosis from Referral: Strain of neck muscle, initial encounter     Evaluation Date: 6/4/2020  Authorization Period Expiration: 12/31/2020  Plan of Care Expiration: 7/31/2020  Visit # / Visits authorized: 1/ 104 (Freq 3w2, 2w4)       Total Visit count: 2    PTA Visit: 0/5      Re-assessment due by: 7/4/2020      Time In: 0730  Time Out: 0825        Precautions: Standard      Subjective     Pt reports: that he currently has 5/10 Left side neck and shoulder pain, and is to return to work this AM.        He N/A compliant with home exercise program.    Response to previous treatment: First visit following eval.  Functional change: Non-remarkable      Pain: 5/10  Location: left neck  and shoulder        Objective       Temo received hot pack for 5 minutes to L cervical spine and shoulder.        Temo received therapeutic exercises to develop strength, endurance, ROM, flexibility and posture for 40 minutes including:    · Seated (hand in belt loop) Left UT stretch 30 sec x 3  · Seated Left levator stretch 30 se x 3  · Doorway pec stretch (2 positions) 30 sec x 2  · Seated scapular depressions 10 x 2  · Seated scapular retractions 10 x 2  · Supine cervical retractions - 10 x 2          Temo received the following manual therapy techniques: N/A were applied to the: N/A for 0 minutes, including:      Temo participated in dynamic functional therapeutic activities to improve functional performance for 0  minutes, including:      Temo received the following direct contact modalities after being cleared for contraindications: N/A    Temo received the following supervised  modalities after being cleared for contradictions: Mechanical Traction:  Patrice received intermittent mechanical traction to the cervical spine at a force of 20 pounds for a total of 25 seconds. Hold time of 10 seconds at a force of 17 pounds rest time, for a total of 15 mnutes Patient tolerated treatment well without any adverse effects.    Temo received cold pack for N/A minutes to N/A.      Home Exercises Provided and Patient Education Provided     Education provided:   - Patient was issued HEP and educated on mode frequency reps, and sets as well as when to stop TE.  Patient verbalzied understanding, performed return demonstration and with no adverse affects noted nor verbalized.        Written Home Exercises Provided: yes.  Exercises were reviewed and Temo was able to demonstrate them prior to the end of the session.  Temo demonstrated fair  understanding of the education provided.     See EMR under Media for exercises provided 6/12/2020.    Assessment      Patient participated with PT to address his cervical radiculopathy and postural dysfunction.  Patient required education and cueing for improved exs quality and performance in order to address his cervical and scapula flexibility.  Patient was educated and issued an HEP as noted and received mechanical traction for his cervical spine of which he responded well with no pain following Rx.  Patient is expected to improve and progress to his established goals with cont PT Rx.        Temo is progressing well towards his goals.   Pt prognosis is Good.     Pt will continue to benefit from skilled outpatient physical therapy to address the deficits listed in the problem list box on initial evaluation, provide pt/family education and to maximize pt's level of independence in the home and community environment.     Pt's spiritual, cultural and educational needs considered and pt agreeable to plan of care and goals.       Anticipated barriers to physical therapy:  None      Goals  Patient Will: STG/LTG Status   1a demonstrate initial HEP with use of handout prn in order to optimize Rx outcomes and max. Functional mob.in 4 visits. 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   STG  In progress 6/12/2020   3a Patient will improve cervical ROM for         L SB to 25* (IE 20*),      L  rotation to 40* (IE 35*)   in order to facilitate improved ability to drive and perform work related fork lift duties in 7 visits.   STG In progress 6/12/2020    4a improve MMT grades for   B LT to 4-/5 (IE3+/5)   L MT to 4-/5 (IE 3+/5)   L Rhomboids to 4-/5 (IE 3+/5)   in order to improve/facilitate scapular stability and posture as well as UE support with lifting/reaching, ADLs- grooming, showering and day to day chores in 8 visits.   STG  In progress 6/12/2020   5a Decrease disability </= 15% (IE 22%) as per the NDI in order to improve QoL, ADLs, and ability to lift and reach in 7 visits. STG     6a relate restful sleep with </= one incidence of disturbed sleep week in order to improve QoL, promote healing and function in 9 visits.   STG     7a relate improved UE function as evidenced by the UEFS to >/= 90% (IE 86.25%) in order to improve reaching/lifting tasks with his day to day  And work related duties in 8 visits.   STG                     1b demonstrate final HEP with use of handout prn in order to optimize Rx outcomes and max. Functional mob.by discharge. 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   LTG     3b  Patient will improve cervical ROM for     L SB to 30* (IE 20*),      L  rotation to 50* (IE  35*)   in order to facilitate improved ability to drive and perform work related fork lift duties in 14 visits.   LTG     4b improve MMT grades for      B LT to 4/5 (IE3+/5)   B MT to 4/5 (IE 3+/5)   B Rhomboids to 4/5 (IE 3+/5)   in order to improve/facilitate scapular stability and posture as well as UE support with lifting/reaching, ADLs- grooming, showering and day to day chores in 14 visits.   LTG     5b Decrease disability </= 5% (IE 22%) as per the NDI in order to improve QoL, ADLs  and ability to lift and reach in 14 visits. LTG     6b   relate restful sleep with no incidences of disturbed sleep over the previous week in order to improve QoL, promote healing and function in 14 visits. LTG     7b relate improved UE function as evidenced by the UEFS to >/= 95% (IE 86.25%) in order to improve reaching/lifting tasks with his day to day  And work related duties in 14 visits. LTG     8 relate imrpoved ability to drive with no exacerbation of pain over the previous week with looking over his B shoulders for improved safety and community mob. In 14 visits LTG                                                 Plan       Cont with current POC follow up on traction results and reinforce HEP      Bob Mercado, PT

## 2020-06-12 NOTE — PROGRESS NOTES
[facility] OUTPATIENT  Physical Therapy Daily Treatment Note     Name: Patrice Smith  Clinic Number: [id number]    Therapy Diagnosis:   Encounter Diagnosis   Name Primary?    Neck pain on left side Yes     Physician: Ant Gallo MD    Visit Date: 6/15/2020    Physician Orders: PT Eval and Treat   Medical Diagnosis from Referral: Strain of neck muscle, initial encounter     Evaluation Date: 6/4/2020  Authorization Period Expiration: 12/31/2020  Plan of Care Expiration: 7/31/2020  Visit # / Visits authorized: 3/ 104 (Freq 3w2, 2w4)       Total Visit count: 3    PTA Visit: 0/5      Re-assessment due by: 7/4/2020      Time In: 1430  Time Out: 1530        Precautions: Standard      Subjective     Pt reports: that he currently has 5/10 Left side neck.  He adds that he had relief form his pain following mechanical traction on last visit that lasted him until this AM.  He also adds that he was complaint with his HEP.        He was compliant with home exercise program.    Response to previous treatment: First visit following eval.  Functional change: Non-remarkable      Pain: 5/10  Location: left neck  and shoulder        Objective       Temo received hot pack for 5 minutes to L cervical spine and shoulder.        Temo received therapeutic exercises to develop strength, endurance, ROM, flexibility and posture for 40 minutes including:    · +pulleys flex and scaption 2 min each  · +SNAGs 10 x 10 sec holds  · Seated (hand in belt loop) Left UT stretch 30 sec x 3  · Seated Left levator stretch 30 se x 3 held  · Doorway pec stretch (2 positions) 30 sec x 2  · + foam roller stretch with 1lb cuff weights at biceps x 4 min.    · Seated scapular depressions 3 sec holds10 x 2   · Seated scapular retractions 3 sec holds 10 x 2  · Supine cervical retractions - 10 x 2           Temo received the following manual therapy techniques: N/A were applied to the: N/A for 0 minutes,  including:      Temo participated in dynamic functional therapeutic activities to improve functional performance for 0  minutes, including:      Temo received the following direct contact modalities after being cleared for contraindications: N/A    Temo received the following supervised modalities after being cleared for contradictions: Mechanical Traction:  Patrice received intermittent mechanical traction to the cervical spine at a force of 22 pounds for a total of 30 seconds. Hold time of 10 seconds at a force of 17 pounds rest time, for a total of 15 mnutes Patient tolerated treatment well without any adverse effects.    Temo received cold pack for N/A minutes to N/A.      Home Exercises Provided and Patient Education Provided     Education provided:   - Patient was issued HEP and educated on mode frequency reps, and sets as well as when to stop TE.  Patient verbalzied understanding, performed return demonstration and with no adverse affects noted nor verbalized.        Written Home Exercises Provided: yes.  Exercises were reviewed and Temo was able to demonstrate them prior to the end of the session.  Temo demonstrated fair  understanding of the education provided.     See EMR under Media for exercises provided 6/12/2020.    Assessment     Patient participated with PT this day to address his postural dysfunction in order to mitigate his pain and improve his function.  Patient was able to fabiola his noted exs and was progressed as above for improved flexibility.  Patient did require cueing for improved technique with cervical and scapular retractions.  Additionally he received mechanical traction as noted as he had good results following last Rx.  He is expected to cont to improve with cont PT Rx in order to optimize his safe functional mob.          Temo is progressing well towards his goals.   Pt prognosis is Good.     Pt will continue to benefit from skilled outpatient physical therapy to address the deficits listed in  the problem list box on initial evaluation, provide pt/family education and to maximize pt's level of independence in the home and community environment.     Pt's spiritual, cultural and educational needs considered and pt agreeable to plan of care and goals.       Anticipated barriers to physical therapy: None      Goals  Patient Will: STG/LTG Status   1a demonstrate initial HEP with use of handout prn in order to optimize Rx outcomes and max. Functional mob.in 4 visits. 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   STG  In progress 6/15/2020   3a Patient will improve cervical ROM for         L SB to 25* (IE 20*),      L  rotation to 40* (IE 35*)   in order to facilitate improved ability to drive and perform work related fork lift duties in 7 visits.   STG In progress 6/15/2020    4a improve MMT grades for   B LT to 4-/5 (IE3+/5)   L MT to 4-/5 (IE 3+/5)   L Rhomboids to 4-/5 (IE 3+/5)   in order to improve/facilitate scapular stability and posture as well as UE support with lifting/reaching, ADLs- grooming, showering and day to day chores in 8 visits.   STG  In progress 6/15/2020   5a Decrease disability </= 15% (IE 22%) as per the NDI in order to improve QoL, ADLs, and ability to lift and reach in 7 visits. STG     6a relate restful sleep with </= one incidence of disturbed sleep week in order to improve QoL, promote healing and function in 9 visits.   STG     7a relate improved UE function as evidenced by the UEFS to >/= 90% (IE 86.25%) in order to improve reaching/lifting tasks with his day to day  And work related duties in 8 visits.   STG                     1b demonstrate final HEP with use of handout prn in order to optimize Rx outcomes and max. Functional mob.by discharge. LTG     2b Relate cervical  pain </= 1/10 at worst over the previous 7 days in order to improve cervical ROM for improved QoL, ADL's, as well as to facilitate ability to drive and perform work related fork lift duties in 14 visits.   LTG     3b  Patient will improve cervical ROM for     L SB to 30* (IE 20*),      L  rotation to 50* (IE 35*)   in order to facilitate improved ability to drive and perform work related fork lift duties in 14 visits.   LTG     4b improve MMT grades for      B LT to 4/5 (IE3+/5)   B MT to 4/5 (IE 3+/5)   B Rhomboids to 4/5 (IE 3+/5)   in order to improve/facilitate scapular stability and posture as well as UE support with lifting/reaching, ADLs- grooming, showering and day to day chores in 14 visits.   LTG     5b Decrease disability </= 5% (IE 22%) as per the NDI in order to improve QoL, ADLs  and ability to lift and reach in 14 visits. LTG     6b   relate restful sleep with no incidences of disturbed sleep over the previous week in order to improve QoL, promote healing and function in 14 visits. LTG     7b relate improved UE function as evidenced by the UEFS to >/= 95% (IE 86.25%) in order to improve reaching/lifting tasks with his day to day  And work related duties in 14 visits. LTG     8 relate imrpoved ability to drive with no exacerbation of pain over the previous week with looking over his B shoulders for improved safety and community mob. In 14 visits LTG                                                 Plan       Cont with current POC follow up on traction results.  Progress TE as fabiola Mercado, PT

## 2020-06-15 ENCOUNTER — CLINICAL SUPPORT (OUTPATIENT)
Dept: REHABILITATION | Facility: HOSPITAL | Age: 60
End: 2020-06-15
Payer: OTHER GOVERNMENT

## 2020-06-15 ENCOUNTER — TELEPHONE (OUTPATIENT)
Dept: FAMILY MEDICINE | Facility: CLINIC | Age: 60
End: 2020-06-15

## 2020-06-15 DIAGNOSIS — M54.2 NECK PAIN ON LEFT SIDE: Primary | ICD-10-CM

## 2020-06-15 PROCEDURE — 97012 MECHANICAL TRACTION THERAPY: CPT

## 2020-06-15 PROCEDURE — 97110 THERAPEUTIC EXERCISES: CPT

## 2020-06-15 NOTE — TELEPHONE ENCOUNTER
----- Message from Leah Baker MD sent at 6/12/2020  4:29 PM CDT -----  Please call patient with normal results. F/u with Dr. Gallo if symptoms recur.

## 2020-06-22 ENCOUNTER — CLINICAL SUPPORT (OUTPATIENT)
Dept: REHABILITATION | Facility: HOSPITAL | Age: 60
End: 2020-06-22
Payer: OTHER GOVERNMENT

## 2020-06-22 ENCOUNTER — DOCUMENTATION ONLY (OUTPATIENT)
Dept: REHABILITATION | Facility: HOSPITAL | Age: 60
End: 2020-06-22

## 2020-06-22 DIAGNOSIS — M54.2 NECK PAIN ON LEFT SIDE: ICD-10-CM

## 2020-06-22 PROCEDURE — 97012 MECHANICAL TRACTION THERAPY: CPT | Mod: CQ

## 2020-06-22 PROCEDURE — 97035 APP MDLTY 1+ULTRASOUND EA 15: CPT | Mod: CQ

## 2020-06-22 PROCEDURE — 97110 THERAPEUTIC EXERCISES: CPT | Mod: CQ

## 2020-06-22 PROCEDURE — 97140 MANUAL THERAPY 1/> REGIONS: CPT | Mod: CQ,59

## 2020-06-22 NOTE — PROGRESS NOTES
On license of [facility] OUTPATIENT  Physical Therapy Daily Treatment Note     Name: Patrice Smith  Clinic Number: [id number]    Therapy Diagnosis:   Encounter Diagnosis   Name Primary?    Neck pain on left side      Physician: Ant Gallo MD    Visit Date: 6/22/2020    Physician Orders: PT Eval and Treat   Medical Diagnosis from Referral: Strain of neck muscle, initial encounter     Evaluation Date: 6/4/2020  Authorization Period Expiration: 12/31/2020  Plan of Care Expiration: 7/31/2020  Visit # / Visits authorized: 4/ 104 (Freq 3w2, 2w4)       Total Visit count: 4    PTA Visit: 0/5      Re-assessment due by: 7/4/2020      Time In: 1530  Time Out: 1630        Precautions: Standard      Subjective     Pt reports: that he currently has 5/10 Left side neck.  He adds that he had relief form his pain following mechanical traction on last visit that lasted him until this AM.  He also adds that he was complaint with his HEP and that his pain will decrease after performing HEP.       He was compliant with home exercise program.    Response to previous treatment: First visit following eval.  Functional change: Non-remarkable      Pain: 5/10  Location: left neck  and shoulder        Objective       Temo received hot pack for 5 minutes to L cervical spine and shoulder.        Temo received therapeutic exercises to develop strength, endurance, ROM, flexibility and posture for 40 minutes including:    · +pulleys flex and scaption 2 min each  · +SNAGs 10 x 10 sec holds  · Seated (hand in belt loop) Left UT stretch 30 sec x 3  · Seated Left levator stretch 30 se x 3 held  · Doorway pec stretch (2 positions) 30 sec x 2  · + foam roller stretch with 1lb cuff weights at biceps x 4 min.    · Seated scapular depressions 3 sec holds10 x 2   · Seated scapular retractions 3 sec holds 10 x 2  · Supine cervical retractions - 10 x 2           Temo received the following manual therapy techniques: STM  were applied to the: L upper trap for 8 minutes      Temo participated in dynamic functional therapeutic activities to improve functional performance for 0  minutes, including:      Temo received the following direct contact modalities after being cleared for contraindications: Ultrasound to L upper trap 1.5 @ 100 % for a duration of 8 minutes. Therapist was in attendance throughout treatment.     Temo received the following supervised modalities after being cleared for contradictions: Mechanical Traction:  Patrice received intermittent mechanical traction to the cervical spine at a force of 24 pounds for a total of 30 seconds. Hold time of 10 seconds at a force of 12 pounds rest time, for a total of 15 mnutes Patient tolerated treatment well without any adverse effects.    Temo received cold pack for N/A minutes to N/A.      Home Exercises Provided and Patient Education Provided     Education provided:   - Patient was issued HEP and educated on mode frequency reps, and sets as well as when to stop TE.  Patient verbalzied understanding, performed return demonstration and with no adverse affects noted nor verbalized.        Written Home Exercises Provided: yes.  Exercises were reviewed and Temo was able to demonstrate them prior to the end of the session.  Temo demonstrated fair  understanding of the education provided.     See EMR under Media for exercises provided 6/12/2020.    Assessment     Patient participated with PT this day to address his postural dysfunction in order to mitigate his pain and improve his function.  Patient was able to fabiola his noted exs and was progressed as above for improved flexibility.  Patient did require cueing for improved technique with cervical and scapular retractions/ depressions.  Additionally he received STM to L upper trap in which severe trigger point noted so US followed up in this area to address soft tissue dysfunction. Mechanical traction as noted as he had good results following  last Rx.  He is expected to cont to improve with cont PT Rx in order to optimize his safe functional mob.          Temo is progressing well towards his goals.   Pt prognosis is Good.     Pt will continue to benefit from skilled outpatient physical therapy to address the deficits listed in the problem list box on initial evaluation, provide pt/family education and to maximize pt's level of independence in the home and community environment.     Pt's spiritual, cultural and educational needs considered and pt agreeable to plan of care and goals.       Anticipated barriers to physical therapy: None      Goals  Patient Will: STG/LTG Status   1a demonstrate initial HEP with use of handout prn in order to optimize Rx outcomes and max. Functional mob.in 4 visits. 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   STG  In progress 6/22/2020   3a Patient will improve cervical ROM for         L SB to 25* (IE 20*),      L  rotation to 40* (IE 35*)   in order to facilitate improved ability to drive and perform work related fork lift duties in 7 visits.   STG In progress 6/22/2020    4a improve MMT grades for   B LT to 4-/5 (IE3+/5)   L MT to 4-/5 (IE 3+/5)   L Rhomboids to 4-/5 (IE 3+/5)   in order to improve/facilitate scapular stability and posture as well as UE support with lifting/reaching, ADLs- grooming, showering and day to day chores in 8 visits.   STG  In progress 6/22/2020   5a Decrease disability </= 15% (IE 22%) as per the NDI in order to improve QoL, ADLs, and ability to lift and reach in 7 visits. STG     6a relate restful sleep with </= one incidence of disturbed sleep week in order to improve QoL, promote healing and function in 9 visits.   STG     7a relate improved UE function as evidenced by the UEFS to >/= 90%  (IE 86.25%) in order to improve reaching/lifting tasks with his day to day  And work related duties in 8 visits.   STG                     1b demonstrate final HEP with use of handout prn in order to optimize Rx outcomes and max. Functional mob.by discharge. 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   LTG     3b  Patient will improve cervical ROM for     L SB to 30* (IE 20*),      L  rotation to 50* (IE 35*)   in order to facilitate improved ability to drive and perform work related fork lift duties in 14 visits.   LTG     4b improve MMT grades for      B LT to 4/5 (IE3+/5)   B MT to 4/5 (IE 3+/5)   B Rhomboids to 4/5 (IE 3+/5)   in order to improve/facilitate scapular stability and posture as well as UE support with lifting/reaching, ADLs- grooming, showering and day to day chores in 14 visits.   LTG     5b Decrease disability </= 5% (IE 22%) as per the NDI in order to improve QoL, ADLs  and ability to lift and reach in 14 visits. LTG     6b   relate restful sleep with no incidences of disturbed sleep over the previous week in order to improve QoL, promote healing and function in 14 visits. LTG     7b relate improved UE function as evidenced by the UEFS to >/= 95% (IE 86.25%) in order to improve reaching/lifting tasks with his day to day  And work related duties in 14 visits. LTG     8 relate imrpoved ability to drive with no exacerbation of pain over the previous week with looking over his B shoulders for improved safety and community mob. In 14 visits LTG                                                 Plan       Cont with current POC follow up on traction results.  Progress TE as fabiola Carvalho, PTA

## 2020-06-23 NOTE — PROGRESS NOTES
[facility] OUT[facility]  Physical Therapy Daily Treatment Note     Name: Patrice Smith  Clinic Number: [id number]    Therapy Diagnosis:   No diagnosis found.  Physician: Ant Gallo MD    Visit Date: 6/24/2020    Physician Orders: PT Eval and Treat   Medical Diagnosis from Referral: Strain of neck muscle, initial encounter     Evaluation Date: 6/4/2020  Authorization Period Expiration: 12/31/2020  Plan of Care Expiration: 7/31/2020  Visit # / Visits authorized: 5/ 104 (Freq 3w2, 2w4)       Total Visit count: 5    PTA Visit: 0/5      Re-assessment due by: 7/4/2020      Time In: ***  Time Out: ***        Precautions: Standard      Subjective     Pt reports: that he currently has 5/10 Left side neck.  He adds that he had relief form his pain following mechanical traction on last visit that lasted him until this AM.  He also adds that he was complaint with his HEP and that his pain will decrease after performing HEP.       He was compliant with home exercise program.    Response to previous treatment: First visit following eval.  Functional change: Non-remarkable      Pain: 5/10  Location: left neck  and shoulder        Objective       Temo received hot pack for 5*** minutes to L cervical spine and shoulder.        Temo received therapeutic exercises to develop strength, endurance, ROM, flexibility and posture for 40*** minutes including:    · +pulleys flex and scaption 2 min each  · +SNAGs 10 x 10 sec holds  · Seated (hand in belt loop) Left UT stretch 30 sec x 3  · Seated Left levator stretch 30 se x 3 held  · Doorway pec stretch (2 positions) 30 sec x 2  · + foam roller stretch with 1lb cuff weights at biceps x 4 min.    · Seated scapular depressions 3 sec holds10 x 2   · Seated scapular retractions 3 sec holds 10 x 2  · Supine cervical retractions - 10 x 2           Temo received the following manual therapy techniques: STM were applied to the: L upper trap for ***8  minutes      Temo participated in dynamic functional therapeutic activities to improve functional performance for 0  minutes, including:      Temo received the following direct contact modalities after being cleared for contraindications: Ultrasound to L upper trap 1.5 @ 100 % for a duration of 8*** minutes. Therapist was in attendance throughout treatment.     Temo received the following supervised modalities after being cleared for contradictions: Mechanical Traction:  Patrice received intermittent mechanical traction to the cervical spine at a force of 24 pounds for a total of 30 seconds. Hold time of 10 seconds at a force of 12 pounds rest time, for a total of 15*** mnutes Patient tolerated treatment well without any adverse effects.    Temo received cold pack for N/A minutes to N/A.      Home Exercises Provided and Patient Education Provided     Education provided:   - Patient was issued HEP and educated on mode frequency reps, and sets as well as when to stop TE.  Patient verbalzied understanding, performed return demonstration and with no adverse affects noted nor verbalized.        Written Home Exercises Provided: yes.  Exercises were reviewed and Temo was able to demonstrate them prior to the end of the session.  Temo demonstrated fair  understanding of the education provided.     See EMR under Media for exercises provided 6/12/2020.    Assessment       ***  ***Patient participated with PT this day to address his postural dysfunction in order to mitigate his pain and improve his function.  Patient was able to fabiola his noted exs and was progressed as above for improved flexibility.  Patient did require cueing for improved technique with cervical and scapular retractions/ depressions.  Additionally he received STM to L upper trap in which severe trigger point noted so US followed up in this area to address soft tissue dysfunction. Mechanical traction as noted as he had good results following last Rx.  He is expected  to cont to improve with cont PT Rx in order to optimize his safe functional mob.          Temo is progressing well towards his goals.   Pt prognosis is Good.     Pt will continue to benefit from skilled outpatient physical therapy to address the deficits listed in the problem list box on initial evaluation, provide pt/family education and to maximize pt's level of independence in the home and community environment.     Pt's spiritual, cultural and educational needs considered and pt agreeable to plan of care and goals.       Anticipated barriers to physical therapy: None      Goals  Patient Will: STG/LTG Status   1a demonstrate initial HEP with use of handout prn in order to optimize Rx outcomes and max. Functional mob.in 4 visits. STG  In progress 6/23/2020   2a Relate cervical pain </= 3/10 at worst over the previous 5 days in order to improve cervical ROM for improved QoL, ADL's, as well as to facilitate ability to drive and perform work related fork lift duties in 7 visits.   STG  In progress 6/23/2020   3a Patient will improve cervical ROM for         L SB to 25* (IE 20*),      L  rotation to 40* (IE 35*)   in order to facilitate improved ability to drive and perform work related fork lift duties in 7 visits.   STG In progress 6/23/2020    4a improve MMT grades for   B LT to 4-/5 (IE3+/5)   L MT to 4-/5 (IE 3+/5)   L Rhomboids to 4-/5 (IE 3+/5)   in order to improve/facilitate scapular stability and posture as well as UE support with lifting/reaching, ADLs- grooming, showering and day to day chores in 8 visits.   STG  In progress 6/23/2020   5a Decrease disability </= 15% (IE 22%) as per the NDI in order to improve QoL, ADLs, and ability to lift and reach in 7 visits. STG     6a relate restful sleep with </= one incidence of disturbed sleep week in order to improve QoL, promote healing and function in 9 visits.   STG     7a relate improved UE function as evidenced by the UEFS to >/= 90% (IE 86.25%) in order to  improve reaching/lifting tasks with his day to day  And work related duties in 8 visits.   STG                     1b demonstrate final HEP with use of handout prn in order to optimize Rx outcomes and max. Functional mob.by discharge. 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   LTG     3b  Patient will improve cervical ROM for     L SB to 30* (IE 20*),      L  rotation to 50* (IE 35*)   in order to facilitate improved ability to drive and perform work related fork lift duties in 14 visits.   LTG     4b improve MMT grades for      B LT to 4/5 (IE3+/5)   B MT to 4/5 (IE 3+/5)   B Rhomboids to 4/5 (IE 3+/5)   in order to improve/facilitate scapular stability and posture as well as UE support with lifting/reaching, ADLs- grooming, showering and day to day chores in 14 visits.   LTG     5b Decrease disability </= 5% (IE 22%) as per the NDI in order to improve QoL, ADLs  and ability to lift and reach in 14 visits. LTG     6b   relate restful sleep with no incidences of disturbed sleep over the previous week in order to improve QoL, promote healing and function in 14 visits. LTG     7b relate improved UE function as evidenced by the UEFS to >/= 95% (IE 86.25%) in order to improve reaching/lifting tasks with his day to day  And work related duties in 14 visits. LTG     8 relate imrpoved ability to drive with no exacerbation of pain over the previous week with looking over his B shoulders for improved safety and community mob. In 14 visits LTG                                                 Plan     ***  Cont with current POC follow up on traction results.  Progress TE as fabiola Mercado, PT

## 2020-06-24 ENCOUNTER — CLINICAL SUPPORT (OUTPATIENT)
Dept: REHABILITATION | Facility: HOSPITAL | Age: 60
End: 2020-06-24
Payer: OTHER GOVERNMENT

## 2020-06-24 DIAGNOSIS — M54.2 NECK PAIN ON LEFT SIDE: ICD-10-CM

## 2020-06-24 PROCEDURE — 97012 MECHANICAL TRACTION THERAPY: CPT | Mod: CQ

## 2020-06-24 PROCEDURE — 97110 THERAPEUTIC EXERCISES: CPT | Mod: CQ

## 2020-06-24 NOTE — PROGRESS NOTES
[facility] OUTPATIENT  Physical Therapy Daily Treatment Note     Name: Patrice Smith  Clinic Number: [id number]    Therapy Diagnosis:   No diagnosis found.  Physician: Ant Gallo MD    Visit Date: 6/24/2020    Physician Orders: PT Eval and Treat   Medical Diagnosis from Referral: Strain of neck muscle, initial encounter     Evaluation Date: 6/4/2020  Authorization Period Expiration: 12/31/2020  Plan of Care Expiration: 7/31/2020  Visit # / Visits authorized: 5/ 104 (Freq 3w2, 2w4)       Total Visit count: 5    PTA Visit: 2/5      Re-assessment due by: 7/4/2020      Time In: 1330  Time Out: 1430        Precautions: Standard      Subjective     Pt reports: that he currently has 2/10 Left side neck.  He adds that he had relief form his pain since his last therapy session.     He was compliant with home exercise program.    Response to previous treatment: pain relief  Functional change: Non-remarkable      Pain: 2/10  Location: left neck  and shoulder        Objective       Temo received hot pack for 5 minutes to L cervical spine and shoulder.  NP      Temo received therapeutic exercises to develop strength, endurance, ROM, flexibility and posture for 40 minutes including:    · +pulleys flex and scaption 2 min each  · +SNAGs 10 x 10 sec holds  · Seated (hand in belt loop) Left UT stretch 30 sec x 3  · Seated Left levator stretch 30 se x 3 held  · Doorway pec stretch (2 positions) 30 sec x 2  · + foam roller stretch with 1lb cuff weights at biceps x 4 min.    · Seated scapular depressions 3 sec holds10 x 2   · Seated scapular retractions 3 sec holds 10 x 2  · Supine cervical retractions - 10 x 2           Temo received the following manual therapy techniques: STM were applied to the: L upper trap for 0 minutes      Temo participated in dynamic functional therapeutic activities to improve functional performance for 0  minutes, including:      Temo received the following  direct contact modalities after being cleared for contraindications: Ultrasound to L upper trap 1.5 @ 100 % for a duration of 0 minutes. Therapist was in attendance throughout treatment.     Temo received the following supervised modalities after being cleared for contradictions: Mechanical Traction:  Patrice received intermittent mechanical traction to the cervical spine at a force of 24 pounds for a total of 30 seconds. Hold time of 10 seconds at a force of 12 pounds rest time, for a total of 15 mnutes Patient tolerated treatment well without any adverse effects.    Temo received cold pack for N/A minutes to N/A.      Home Exercises Provided and Patient Education Provided     Education provided:   - Patient was issued HEP and educated on mode frequency reps, and sets as well as when to stop TE.  Patient verbalzied understanding, performed return demonstration and with no adverse affects noted nor verbalized.        Written Home Exercises Provided: yes.  Exercises were reviewed and Temo was able to demonstrate them prior to the end of the session.  Temo demonstrated fair  understanding of the education provided.     See EMR under Media for exercises provided 6/12/2020.    Assessment     Patient participated with PT this day to address his postural dysfunction in order to mitigate his pain and improve his function.  Patient was able to fabiola his noted exs and was progressed as above for improved flexibility.  Patient did require cueing for improved technique with cervical and scapular retractions/ depressions. He was able to perform all recommended TE without increase of painful symptoms. Mechanical traction performed again today as he reports good results following modalities.  He is expected to cont to improve with cont PT Rx in order to optimize his safe functional mob.          Temo is progressing well towards his goals.   Pt prognosis is Good.     Pt will continue to benefit from skilled outpatient physical therapy to  address the deficits listed in the problem list box on initial evaluation, provide pt/family education and to maximize pt's level of independence in the home and community environment.     Pt's spiritual, cultural and educational needs considered and pt agreeable to plan of care and goals.       Anticipated barriers to physical therapy: None      Goals  Patient Will: STG/LTG Status   1a demonstrate initial HEP with use of handout prn in order to optimize Rx outcomes and max. Functional mob.in 4 visits. STG  In progress 6/24/2020   2a Relate cervical pain </= 3/10 at worst over the previous 5 days in order to improve cervical ROM for improved QoL, ADL's, as well as to facilitate ability to drive and perform work related fork lift duties in 7 visits.   STG  In progress 6/24/2020   3a Patient will improve cervical ROM for         L SB to 25* (IE 20*),      L  rotation to 40* (IE 35*)   in order to facilitate improved ability to drive and perform work related fork lift duties in 7 visits.   STG In progress 6/24/2020    4a improve MMT grades for   B LT to 4-/5 (IE3+/5)   L MT to 4-/5 (IE 3+/5)   L Rhomboids to 4-/5 (IE 3+/5)   in order to improve/facilitate scapular stability and posture as well as UE support with lifting/reaching, ADLs- grooming, showering and day to day chores in 8 visits.   STG  In progress 6/24/2020   5a Decrease disability </= 15% (IE 22%) as per the NDI in order to improve QoL, ADLs, and ability to lift and reach in 7 visits. STG     6a relate restful sleep with </= one incidence of disturbed sleep week in order to improve QoL, promote healing and function in 9 visits.   STG     7a relate improved UE function as evidenced by the UEFS to >/= 90% (IE 86.25%) in order to improve reaching/lifting tasks with his day to day  And work related duties in 8 visits.   STG                     1b demonstrate final HEP with use of handout prn in order to optimize Rx outcomes and max. Functional mob.by discharge.  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   LTG     3b  Patient will improve cervical ROM for     L SB to 30* (IE 20*),      L  rotation to 50* (IE 35*)   in order to facilitate improved ability to drive and perform work related fork lift duties in 14 visits.   LTG     4b improve MMT grades for      B LT to 4/5 (IE3+/5)   B MT to 4/5 (IE 3+/5)   B Rhomboids to 4/5 (IE 3+/5)   in order to improve/facilitate scapular stability and posture as well as UE support with lifting/reaching, ADLs- grooming, showering and day to day chores in 14 visits.   LTG     5b Decrease disability </= 5% (IE 22%) as per the NDI in order to improve QoL, ADLs  and ability to lift and reach in 14 visits. LTG     6b   relate restful sleep with no incidences of disturbed sleep over the previous week in order to improve QoL, promote healing and function in 14 visits. LTG     7b relate improved UE function as evidenced by the UEFS to >/= 95% (IE 86.25%) in order to improve reaching/lifting tasks with his day to day  And work related duties in 14 visits. LTG     8 relate imrpoved ability to drive with no exacerbation of pain over the previous week with looking over his B shoulders for improved safety and community mob. In 14 visits LTG                                                 Plan       Cont with current POC follow up on traction results.  Progress TE as fabiola Carvalho, PTA

## 2020-06-26 ENCOUNTER — CLINICAL SUPPORT (OUTPATIENT)
Dept: REHABILITATION | Facility: HOSPITAL | Age: 60
End: 2020-06-26
Payer: OTHER GOVERNMENT

## 2020-06-26 DIAGNOSIS — M54.2 NECK PAIN ON LEFT SIDE: ICD-10-CM

## 2020-06-26 PROCEDURE — 97110 THERAPEUTIC EXERCISES: CPT | Mod: CQ

## 2020-06-26 PROCEDURE — 97140 MANUAL THERAPY 1/> REGIONS: CPT | Mod: CQ

## 2020-06-26 NOTE — PROGRESS NOTES
"                               [facility] OUTPATIENT  Physical Therapy Daily Treatment Note     Name: Patrice Smith  Clinic Number: [id number]    Therapy Diagnosis:   Encounter Diagnosis   Name Primary?    Neck pain on left side      Physician: Ant Gallo MD    Visit Date: 6/26/2020    Physician Orders: PT Eval and Treat   Medical Diagnosis from Referral: Strain of neck muscle, initial encounter     Evaluation Date: 6/4/2020  Authorization Period Expiration: 12/31/2020  Plan of Care Expiration: 7/31/2020  Visit # / Visits authorized: 6/ 104 (Freq 3w2, 2w4)       Total Visit count: 6    PTA Visit: 2/5      Re-assessment due by: 7/4/2020      Time In: 1515  Time Out: 1615      Precautions: Standard      Subjective     Pt reports: that he currently has 2/10 Left side neck.  He adds that he had relief form his pain since his last therapy session. " I don't know if it's ever going to get under a 2/10."    He was compliant with home exercise program.    Response to previous treatment: pain relief  Functional change: Non-remarkable      Pain: 2/10  Location: left neck  and shoulder        Objective         Temo received therapeutic exercises to develop strength, endurance, ROM, flexibility and posture for 40 minutes including:    · +pulleys flex and scaption 2 min each  · +SNAGs 10 x 10 sec holds  · Seated (hand in belt loop) Left UT stretch 30 sec x 3  · Seated Left levator stretch 30 se x 3 held  · Doorway pec stretch (2 positions) 30 sec x 2  · + foam roller stretch with 1lb cuff weights at biceps x 4 min.  NP  · Seated scapular depressions on wall with arm lifts 10 x each   · Standing rows with theraband 2 x 10   · Bruggers with PTB 2 x 10   · Supine cervical retractions - 10 x 2           Temo received the following manual therapy techniques: STM were applied to the: L upper trap for 8 minutes      Temo participated in dynamic functional therapeutic activities to improve functional performance for " 0  minutes, including:      Temo received the following direct contact modalities after being cleared for contraindications: Ultrasound to L upper trap 1.5 @ 100 % for a duration of 0 minutes. Therapist was in attendance throughout treatment.     Temo received the following supervised modalities after being cleared for contradictions: Mechanical Traction:  Patrice received intermittent mechanical traction to the cervical spine at a force of 24 pounds for a total of 30 seconds. Hold time of 10 seconds at a force of 12 pounds rest time, for a total of 15 mnutes Patient tolerated treatment well without any adverse effects. DNP    Temo received cold pack for N/A minutes to N/A.      Home Exercises Provided and Patient Education Provided     Education provided:   - Patient was issued HEP and educated on mode frequency reps, and sets as well as when to stop TE.  Patient verbalzied understanding, performed return demonstration and with no adverse affects noted nor verbalized.        Written Home Exercises Provided: yes.  Exercises were reviewed and Temo was able to demonstrate them prior to the end of the session.  Temo demonstrated fair  understanding of the education provided.     See EMR under Media for exercises provided 6/12/2020.    Assessment     Patient participated with PT this day to address his postural dysfunction in order to mitigate his pain and improve his function.  Patient was able to fabiola his noted exs and was progressed as above for improved flexibility and periscapular strength.  He was able to perform all recommended TE without increase of painful symptoms.  He is expected to cont to improve with cont PT Rx in order to optimize his safe functional mob.          Temo is progressing well towards his goals.   Pt prognosis is Good.     Pt will continue to benefit from skilled outpatient physical therapy to address the deficits listed in the problem list box on initial evaluation, provide pt/family education and  to maximize pt's level of independence in the home and community environment.     Pt's spiritual, cultural and educational needs considered and pt agreeable to plan of care and goals.       Anticipated barriers to physical therapy: None      Goals  Patient Will: STG/LTG Status   1a demonstrate initial HEP with use of handout prn in order to optimize Rx outcomes and max. Functional mob.in 4 visits. 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   STG  In progress 6/26/2020   3a Patient will improve cervical ROM for         L SB to 25* (IE 20*),      L  rotation to 40* (IE 35*)   in order to facilitate improved ability to drive and perform work related fork lift duties in 7 visits.   STG In progress 6/26/2020    4a improve MMT grades for   B LT to 4-/5 (IE3+/5)   L MT to 4-/5 (IE 3+/5)   L Rhomboids to 4-/5 (IE 3+/5)   in order to improve/facilitate scapular stability and posture as well as UE support with lifting/reaching, ADLs- grooming, showering and day to day chores in 8 visits.   STG  In progress 6/26/2020   5a Decrease disability </= 15% (IE 22%) as per the NDI in order to improve QoL, ADLs, and ability to lift and reach in 7 visits. STG     6a relate restful sleep with </= one incidence of disturbed sleep week in order to improve QoL, promote healing and function in 9 visits.   STG     7a relate improved UE function as evidenced by the UEFS to >/= 90% (IE 86.25%) in order to improve reaching/lifting tasks with his day to day  And work related duties in 8 visits.   STG                     1b demonstrate final HEP with use of handout prn in order to optimize Rx outcomes and max. Functional mob.by discharge. LTG     2b Relate cervical pain </= 1/10 at worst over the previous 7 days in order to improve cervical  ROM for improved QoL, ADL's, as well as to facilitate ability to drive and perform work related fork lift duties in 14 visits.   LTG     3b  Patient will improve cervical ROM for     L SB to 30* (IE 20*),      L  rotation to 50* (IE 35*)   in order to facilitate improved ability to drive and perform work related fork lift duties in 14 visits.   LTG     4b improve MMT grades for      B LT to 4/5 (IE3+/5)   B MT to 4/5 (IE 3+/5)   B Rhomboids to 4/5 (IE 3+/5)   in order to improve/facilitate scapular stability and posture as well as UE support with lifting/reaching, ADLs- grooming, showering and day to day chores in 14 visits.   LTG     5b Decrease disability </= 5% (IE 22%) as per the NDI in order to improve QoL, ADLs  and ability to lift and reach in 14 visits. LTG     6b   relate restful sleep with no incidences of disturbed sleep over the previous week in order to improve QoL, promote healing and function in 14 visits. LTG     7b relate improved UE function as evidenced by the UEFS to >/= 95% (IE 86.25%) in order to improve reaching/lifting tasks with his day to day  And work related duties in 14 visits. LTG     8 relate imrpoved ability to drive with no exacerbation of pain over the previous week with looking over his B shoulders for improved safety and community mob. In 14 visits LTG                                                 Plan       Cont with current POC follow up on traction results.  Progress TE as fabiola Carvalho, PTA

## 2020-06-29 ENCOUNTER — CLINICAL SUPPORT (OUTPATIENT)
Dept: REHABILITATION | Facility: HOSPITAL | Age: 60
End: 2020-06-29
Payer: OTHER GOVERNMENT

## 2020-06-29 DIAGNOSIS — M54.2 NECK PAIN ON LEFT SIDE: ICD-10-CM

## 2020-06-29 PROCEDURE — 97110 THERAPEUTIC EXERCISES: CPT | Mod: CQ

## 2020-06-29 PROCEDURE — 97012 MECHANICAL TRACTION THERAPY: CPT | Mod: CQ

## 2020-06-29 NOTE — PROGRESS NOTES
[facility] OUTPATIENT  Physical Therapy Daily Treatment Note     Name: Patrice Smith  Clinic Number: [id number]    Therapy Diagnosis:   No diagnosis found.  Physician: Ant Gallo MD    Visit Date: 6/29/2020    Physician Orders: PT Eval and Treat   Medical Diagnosis from Referral: Strain of neck muscle, initial encounter     Evaluation Date: 6/4/2020  Authorization Period Expiration: 12/31/2020  Plan of Care Expiration: 7/31/2020  Visit # / Visits authorized: 7/ 104 (Freq 3w2, 2w4)       Total Visit count: 7    PTA Visit: 3/5      Re-assessment due by: 7/4/2020      Time In: 1445  Time Out: 1530      Precautions: Standard      Subjective     Pt reports: that he currently has 2/10 Left side neck.     He was compliant with home exercise program.    Response to previous treatment: pain relief  Functional change: Non-remarkable      Pain: 2/10  Location: left neck  and shoulder        Objective         Temo received therapeutic exercises to develop strength, endurance, ROM, flexibility and posture for 40 minutes including:    · +pulleys flex and scaption 2 min each  · +SNAGs 10 x 10 sec holds  · Seated  Left UT stretch 30 sec x 3  · Seated Left levator stretch 30 se x 3   · Seated SCM stretch 3 x 30 L   · Doorway pec stretch (2 positions) 30 sec x 2  · + foam roller stretch with 1lb cuff weights at biceps x 4 min.  NP  · Seated scapular depressions on wall with arm lifts 10 x each   · Standing rows with theraband 2 x 10   · Bruggers with PTB 2 x 10   · Supine cervical retractions - 10 x 2           Temo received the following manual therapy techniques: STM were applied to the: L upper trap for 0 minutes      Temo participated in dynamic functional therapeutic activities to improve functional performance for 0  minutes, including:      Temo received the following direct contact modalities after being cleared for contraindications: Ultrasound to L upper trap 1.5 @ 100 % for  a duration of 0 minutes. Therapist was in attendance throughout treatment.     Temo received the following supervised modalities after being cleared for contradictions: Mechanical Traction:  Patrice received intermittent mechanical traction to the cervical spine at a force of 24 pounds for a total of 30 seconds. Hold time of 10 seconds at a force of 12 pounds rest time, for a total of 15 mnutes Patient tolerated treatment well without any adverse effects.     Temo received cold pack for N/A minutes to N/A.      Home Exercises Provided and Patient Education Provided     Education provided:   - Patient was issued HEP and educated on mode frequency reps, and sets as well as when to stop TE.  Patient verbalzied understanding, performed return demonstration and with no adverse affects noted nor verbalized.        Written Home Exercises Provided: yes.  Exercises were reviewed and Temo was able to demonstrate them prior to the end of the session.  Temo demonstrated fair  understanding of the education provided.     See EMR under Media for exercises provided 6/12/2020.    Assessment     Patient participated with PT this day to address his postural dysfunction in order to mitigate his pain and improve his function.  Patient was able to fabiola his noted exs and was progressed as above for improved flexibility and periscapular strength.  He was able to perform all recommended TE without increase of painful symptoms. Mechanical traction resumed today as pt reported no significant decrease of pain following manual therapy last treatment. He is expected to cont to improve with cont PT Rx in order to optimize his safe functional mob.      Temo is progressing well towards his goals.   Pt prognosis is Good.     Pt will continue to benefit from skilled outpatient physical therapy to address the deficits listed in the problem list box on initial evaluation, provide pt/family education and to maximize pt's level of independence in the home and  community environment.     Pt's spiritual, cultural and educational needs considered and pt agreeable to plan of care and goals.       Anticipated barriers to physical therapy: None      Goals  Patient Will: STG/LTG Status   1a demonstrate initial HEP with use of handout prn in order to optimize Rx outcomes and max. Functional mob.in 4 visits. STG  In progress 6/29/2020   2a Relate cervical pain </= 3/10 at worst over the previous 5 days in order to improve cervical ROM for improved QoL, ADL's, as well as to facilitate ability to drive and perform work related fork lift duties in 7 visits.   STG  In progress 6/29/2020   3a Patient will improve cervical ROM for         L SB to 25* (IE 20*),      L  rotation to 40* (IE 35*)   in order to facilitate improved ability to drive and perform work related fork lift duties in 7 visits.   STG In progress 6/29/2020    4a improve MMT grades for   B LT to 4-/5 (IE3+/5)   L MT to 4-/5 (IE 3+/5)   L Rhomboids to 4-/5 (IE 3+/5)   in order to improve/facilitate scapular stability and posture as well as UE support with lifting/reaching, ADLs- grooming, showering and day to day chores in 8 visits.   STG  In progress 6/29/2020   5a Decrease disability </= 15% (IE 22%) as per the NDI in order to improve QoL, ADLs, and ability to lift and reach in 7 visits. STG     6a relate restful sleep with </= one incidence of disturbed sleep week in order to improve QoL, promote healing and function in 9 visits.   STG     7a relate improved UE function as evidenced by the UEFS to >/= 90% (IE 86.25%) in order to improve reaching/lifting tasks with his day to day  And work related duties in 8 visits.   STG                     1b demonstrate final HEP with use of handout prn in order to optimize Rx outcomes and max. Functional mob.by discharge. LTG     2b Relate cervical pain </= 1/10 at worst over the previous 7 days in order to improve cervical ROM for improved QoL, ADL's, as well as to facilitate  ability to drive and perform work related fork lift duties in 14 visits.   LTG     3b  Patient will improve cervical ROM for     L SB to 30* (IE 20*),      L  rotation to 50* (IE 35*)   in order to facilitate improved ability to drive and perform work related fork lift duties in 14 visits.   LTG     4b improve MMT grades for      B LT to 4/5 (IE3+/5)   B MT to 4/5 (IE 3+/5)   B Rhomboids to 4/5 (IE 3+/5)   in order to improve/facilitate scapular stability and posture as well as UE support with lifting/reaching, ADLs- grooming, showering and day to day chores in 14 visits.   LTG     5b Decrease disability </= 5% (IE 22%) as per the NDI in order to improve QoL, ADLs  and ability to lift and reach in 14 visits. LTG     6b   relate restful sleep with no incidences of disturbed sleep over the previous week in order to improve QoL, promote healing and function in 14 visits. LTG     7b relate improved UE function as evidenced by the UEFS to >/= 95% (IE 86.25%) in order to improve reaching/lifting tasks with his day to day  And work related duties in 14 visits. LTG     8 relate imrpoved ability to drive with no exacerbation of pain over the previous week with looking over his B shoulders for improved safety and community mob. In 14 visits LTG                                                 Plan       Cont with current POC follow up on traction results.  Progress TE as fabiola Carvalho, PTA

## 2020-07-01 NOTE — PROGRESS NOTES
[facility] OUTPATIENT  Physical Therapy Daily Treatment Note/ Reassessment     Name: Patrice Smith  Clinic Number: [id number]    Therapy Diagnosis:   No diagnosis found.  Physician: Ant Gallo MD    Visit Date: 7/2/2020    Physician Orders: PT Eval and Treat   Medical Diagnosis from Referral: Strain of neck muscle, initial encounter     Evaluation Date: 6/4/2020  Authorization Period Expiration: 12/31/2020  Plan of Care Expiration: 7/31/2020  Visit # / Visits authorized: 8/ 104 (Freq 3w2, 2w4)       Total Visit count: 8    PTA Visit: 0/5      Re-assessment due by: 8/2/2020     Time In: 0745  Time Out: 0830      Precautions: Standard      Subjective     Pt reports: that he currently has discomfort that he rates as 0.5/10 and adds that his worst pain over the past week was a 2/10.  Moreover he has obtained restful sleep for > a week.  He adds that he is able to lift 30 lb loads below shoulder without any issues, but does intermittently have pain with lifting above shoulders.      He was compliant with home exercise program.    Response to previous treatment: pain relief  Functional change: Non-remarkable      Pain: < 1/10  Location: left neck  and shoulder        Objective       Temo received therapeutic exercises to develop strength, endurance, ROM, flexibility and posture for 15 minutes including:    · pulleys flex and scaption 2 min each held  · +UBE x 6 min (3 fwd and 3 back)  · SNAGs 10 x 10 sec holds   · Seated  Left UT stretch 30 sec x 3 held  · Seated Left levator stretch 30 se x 3 held  · Seated SCM stretch 3 x 30 L held  · Doorway pec stretch (2 positions) 30 sec x 2  · + foam roller stretch with 1lb cuff weights at biceps x 4 min.  NP  · Seated scapular depressions on wall with arm lifts 10 x each   · Standing rows with Pink theraband with 3 sec hold.  2 x 10   · Bruggers with PTB 2 x 10   · Supine cervical retractions with 3 sec hold- 10 x 2           Temo  received the following manual therapy techniques: STM were applied to the: L upper trap for 0 minutes      Temo participated in dynamic functional therapeutic activities/physical performance testing to improve functional performance for 28  minutes, including:      C-Spine      ROM                   C-Spine AROM AROM   Status Comment     Left Right                       Flexion 55 * - *         Extension 45 * - *         Sidebending 25* NT         Rotation 60 * NT                                                                          MMT             Status Comment     Left Right                   Lower Trapezius 4-/5 4-/5       Middle Trapezius 4-/5 4-/5       Rhomboids 4-/5  4-/5                                                                 FUNCTIONAL TESTS                                                              Neck Disability Index: 1/50 = 2% Disability                      UEFS: 79/80 = 98.75%              Temo received the following direct contact modalities after being cleared for contraindications: Ultrasound to L upper trap 1.5 @ 100 % for a duration of 0 minutes. Therapist was in attendance throughout treatment.     Temo received the following supervised modalities after being cleared for contradictions: Mechanical Traction:  Patrice received intermittent mechanical traction to the cervical spine at a force of 24 pounds for a total of 30 seconds. Hold time of 10 seconds at a force of 12 pounds rest time, for a total of  0 mnutes Patient tolerated treatment well without any adverse effects.     Temo received cold pack for N/A minutes to N/A.      Home Exercises Provided and Patient Education Provided     Education provided:   - Patient was issued HEP and educated on mode frequency reps, and sets as well as when to stop TE.  Patient verbalzied understanding, performed return demonstration and with no adverse affects noted nor verbalized.        Written Home Exercises Provided: yes.  Exercises were  reviewed and Temo was able to demonstrate them prior to the end of the session.  Temo demonstrated fair  understanding of the education provided.     See EMR under Media for exercises provided 6/12/2020.    ReAssessment     Patient has participated with PT to address his pain, strength, ROM/flexiblity, and function.  He has made progress in all areas and with noted improvements regarding his ability to lift and perform his work related duties.  He currently relates a 98.75% function as per the UEFS and has met all STG.  He is expected to improve and progress to his established goals with cont Rx for improved flexibility/ROM strength activity fabiola and mobility in order to return to a pian free/reduced level of mobility.       Temo is progressing well towards his goals.   Pt prognosis is Good.     Pt will continue to benefit from skilled outpatient physical therapy to address the deficits listed in the problem list box on initial evaluation, provide pt/family education and to maximize pt's level of independence in the home and community environment.     Pt's spiritual, cultural and educational needs considered and pt agreeable to plan of care and goals.       Anticipated barriers to physical therapy: None      Goals  Patient Will: STG/LTG Status   1a demonstrate initial HEP with use of handout prn in order to optimize Rx outcomes and max. Functional mob.in 4 visits. 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   STG Met 7/2/2020   3a Patient will improve cervical ROM for         L SB to 25* (IE 20*),      L  rotation to 40* (IE 35*)   in order to facilitate improved ability to drive and perform work related fork lift duties in 7 visits.   STG Met 7/2/2020   4a improve MMT grades for   B LT to 4-/5 (IE3+/5)   L MT to 4-/5 (IE  3+/5)   L Rhomboids to 4-/5 (IE 3+/5)   in order to improve/facilitate scapular stability and posture as well as UE support with lifting/reaching, ADLs- grooming, showering and day to day chores in 8 visits.   STG  Met 7/2/2020   5a Decrease disability </= 15% (IE 22%) as per the NDI in order to improve QoL, ADLs, and ability to lift and reach in 7 visits. STG Met 7/2/2020    6a relate restful sleep with </= one incidence of disturbed sleep week in order to improve QoL, promote healing and function in 9 visits.   STG Met 7/2/2020    7a relate improved UE function as evidenced by the UEFS to >/= 90% (IE 86.25%) in order to improve reaching/lifting tasks with his day to day  And work related duties in 8 visits.   STG Met 7/2/2020                    1b demonstrate final HEP with use of handout prn in order to optimize Rx outcomes and max. Functional mob.by discharge. 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   LTG     3b  Patient will improve cervical ROM for     L SB to 30* (IE 20*),      L  rotation to 50* (IE 35*)   in order to facilitate improved ability to drive and perform work related fork lift duties in 14 visits.   LTG     4b improve MMT grades for      B LT to 4/5 (IE3+/5)   B MT to 4/5 (IE 3+/5)   B Rhomboids to 4/5 (IE 3+/5)   in order to improve/facilitate scapular stability and posture as well as UE support with lifting/reaching, ADLs- grooming, showering and day to day chores in 14 visits.   LTG     5b Decrease disability </= 5% (IE 22%) as per the NDI in order to improve QoL, ADLs  and ability to lift and reach in 14 visits. LTG     6b   relate restful sleep with no incidences of disturbed sleep over the previous week in order to improve QoL, promote healing and function in 14 visits. LTG     7b relate improved UE  function as evidenced by the UEFS to >/= 95% (IE 86.25%) in order to improve reaching/lifting tasks with his day to day  And work related duties in 14 visits. LTG     8 relate imrpoved ability to drive with no exacerbation of pain over the previous week with looking over his B shoulders for improved safety and community mob. In 14 visits LTG                                                 Plan       Cont with current POC follow up.  Progress TE as fabiola Mercado, PT

## 2020-07-02 ENCOUNTER — CLINICAL SUPPORT (OUTPATIENT)
Dept: REHABILITATION | Facility: HOSPITAL | Age: 60
End: 2020-07-02
Payer: OTHER GOVERNMENT

## 2020-07-02 DIAGNOSIS — M54.2 NECK PAIN ON LEFT SIDE: Primary | ICD-10-CM

## 2020-07-02 PROCEDURE — 97110 THERAPEUTIC EXERCISES: CPT

## 2020-07-02 PROCEDURE — 97750 PHYSICAL PERFORMANCE TEST: CPT

## 2020-07-06 ENCOUNTER — DOCUMENTATION ONLY (OUTPATIENT)
Dept: REHABILITATION | Facility: HOSPITAL | Age: 60
End: 2020-07-06

## 2020-07-06 ENCOUNTER — CLINICAL SUPPORT (OUTPATIENT)
Dept: REHABILITATION | Facility: HOSPITAL | Age: 60
End: 2020-07-06
Payer: OTHER GOVERNMENT

## 2020-07-06 DIAGNOSIS — M54.2 NECK PAIN ON LEFT SIDE: ICD-10-CM

## 2020-07-06 PROCEDURE — 97110 THERAPEUTIC EXERCISES: CPT | Mod: CQ

## 2020-07-06 NOTE — PROGRESS NOTES
PT/PTA face to face conference completed regarding patient treatment plan and progress towards established goals. Treatment will be continued as described in initial report/ evaluation and treatment/progress notes. Patient will be seen by physical therapist every sixth visit or minimally once per month.       Deloris Gamble, PTA

## 2020-07-06 NOTE — PROGRESS NOTES
"                               [facility] OUTPATIENT  Physical Therapy Daily Treatment Note     Name: Patrice Smith  Clinic Number: [id number]    Therapy Diagnosis:   Encounter Diagnosis   Name Primary?    Neck pain on left side      Physician: Ant Gallo MD    Visit Date: 7/6/2020    Physician Orders: PT Eval and Treat   Medical Diagnosis from Referral: Strain of neck muscle, initial encounter     Evaluation Date: 6/4/2020  Authorization Period Expiration: 12/31/2020  Plan of Care Expiration: 7/31/2020  Visit # / Visits authorized: 9/ 104 (Freq 3w2, 2w4)       Total Visit count: 9    PTA Visit: 1/5      Re-assessment due by: 8/2/2020     Time In: 0830  Time Out: 0915      Precautions: Standard      Subjective     Pt reports: "I am doing so much better." He reports most of his ADL's have improved. He occasionally gets irritation in the L side of his neck but his pinched nerve is gone.    He was compliant with home exercise program.    Response to previous treatment: pain relief  Functional change: Non-remarkable      Pain: .5/10  Location: left neck  and shoulder        Objective       Temo received therapeutic exercises to develop strength, endurance, ROM, flexibility and posture for 45 minutes including:    · pulleys flex and scaption 2 min each held  · UBE x 6 min (3 fwd and 3 back)  · SNAGs 10 x 10 sec holds   · +chin tucks 5 sec holds x 15 reps   · +chin tucks with fwd flexion 5 sec holds x 10 reps  · +cervical rotation iso 5 sec holds x 10 reps  · +seated with slight chin tuck multifidi iso 5 sec holds x 10 reps to the L only  · Seated  Left UT stretch 30 sec x 3 held  · Seated Left levator stretch 30 se x 3 held  · Seated SCM stretch 3 x 30 L held  · Doorway pec stretch (2 positions) 30 sec x 2  ·  foam roller stretch with 1lb cuff weights at biceps x 4 min.  NP  · Seated scapular depressions on wall with arm lifts 10 x each   · Standing rows with Pink theraband with 3 sec hold.  2 x 10 "   · Bruggers with PTB 2 x 10   · Supine cervical retractions with 3 sec hold- 10 x 2   · +Standing lower trap 2 x 10 reps 3 sec holds          Temo received the following manual therapy techniques: STM were applied to the: L upper trap for 0 minutes      Temo participated in dynamic functional therapeutic activities/physical performance testing to improve functional performance for   minutes, including:      Temo received the following direct contact modalities after being cleared for contraindications: Ultrasound to L upper trap 1.5 @ 100 % for a duration of 0 minutes. Therapist was in attendance throughout treatment.     Temo received the following supervised modalities after being cleared for contradictions: Mechanical Traction:  Patrice received intermittent mechanical traction to the cervical spine at a force of 24 pounds for a total of 30 seconds. Hold time of 10 seconds at a force of 12 pounds rest time, for a total of  0 mnutes Patient tolerated treatment well without any adverse effects.     Temo received cold pack for N/A minutes to N/A.      Home Exercises Provided and Patient Education Provided     Education provided:   - Patient was issued HEP and educated on mode frequency reps, and sets as well as when to stop TE.  Patient verbalzied understanding, performed return demonstration and with no adverse affects noted nor verbalized.        Written Home Exercises Provided: yes.  Exercises were reviewed and Temo was able to demonstrate them prior to the end of the session.  Temo demonstrated fair  understanding of the education provided.     See EMR under Media for exercises provided 6/12/2020.    Assessment     Pt tolerated additional there-ex well with reports of improvement post. He verbalizes significant improvement in symptoms since starting therapy. He noted /c difficulty when performing chin tuck with cervical flexion evident by visual muscle fatigue. Pt will continue to benefit from skilled PT to improve  cervical strength and ROM to allow him to return to PLOF without pain or limitations.      Temo is progressing well towards his goals.   Pt prognosis is Good.     Pt will continue to benefit from skilled outpatient physical therapy to address the deficits listed in the problem list box on initial evaluation, provide pt/family education and to maximize pt's level of independence in the home and community environment.     Pt's spiritual, cultural and educational needs considered and pt agreeable to plan of care and goals.       Anticipated barriers to physical therapy: None      Goals  Patient Will: STG/LTG Status   1a demonstrate initial HEP with use of handout prn in order to optimize Rx outcomes and max. Functional mob.in 4 visits. 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   STG Met 7/2/2020   3a Patient will improve cervical ROM for         L SB to 25* (IE 20*),      L  rotation to 40* (IE 35*)   in order to facilitate improved ability to drive and perform work related fork lift duties in 7 visits.   STG Met 7/2/2020   4a improve MMT grades for   B LT to 4-/5 (IE3+/5)   L MT to 4-/5 (IE 3+/5)   L Rhomboids to 4-/5 (IE 3+/5)   in order to improve/facilitate scapular stability and posture as well as UE support with lifting/reaching, ADLs- grooming, showering and day to day chores in 8 visits.   STG  Met 7/2/2020   5a Decrease disability </= 15% (IE 22%) as per the NDI in order to improve QoL, ADLs, and ability to lift and reach in 7 visits. STG Met 7/2/2020    6a relate restful sleep with </= one incidence of disturbed sleep week in order to improve QoL, promote healing and function in 9 visits.   STG Met 7/2/2020    7a relate improved UE function as evidenced by the UEFS to >/= 90% (IE 86.25%) in order to improve  reaching/lifting tasks with his day to day  And work related duties in 8 visits.   STG Met 7/2/2020                    1b demonstrate final HEP with use of handout prn in order to optimize Rx outcomes and max. Functional mob.by discharge. 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   LTG     3b  Patient will improve cervical ROM for     L SB to 30* (IE 20*),      L  rotation to 50* (IE 35*)   in order to facilitate improved ability to drive and perform work related fork lift duties in 14 visits.   LTG     4b improve MMT grades for      B LT to 4/5 (IE3+/5)   B MT to 4/5 (IE 3+/5)   B Rhomboids to 4/5 (IE 3+/5)   in order to improve/facilitate scapular stability and posture as well as UE support with lifting/reaching, ADLs- grooming, showering and day to day chores in 14 visits.   LTG     5b Decrease disability </= 5% (IE 22%) as per the NDI in order to improve QoL, ADLs  and ability to lift and reach in 14 visits. LTG     6b   relate restful sleep with no incidences of disturbed sleep over the previous week in order to improve QoL, promote healing and function in 14 visits. LTG     7b relate improved UE function as evidenced by the UEFS to >/= 95% (IE 86.25%) in order to improve reaching/lifting tasks with his day to day  And work related duties in 14 visits. LTG     8 relate imrpoved ability to drive with no exacerbation of pain over the previous week with looking over his B shoulders for improved safety and community mob. In 14 visits LTG                                                 Plan       Cont with current POC follow up.  Progress TE as fabiola Gamble, PTA

## 2020-07-13 ENCOUNTER — OFFICE VISIT (OUTPATIENT)
Dept: FAMILY MEDICINE | Facility: CLINIC | Age: 60
End: 2020-07-13
Payer: OTHER GOVERNMENT

## 2020-07-13 VITALS
RESPIRATION RATE: 17 BRPM | SYSTOLIC BLOOD PRESSURE: 120 MMHG | HEIGHT: 64 IN | DIASTOLIC BLOOD PRESSURE: 70 MMHG | HEART RATE: 66 BPM | OXYGEN SATURATION: 97 % | WEIGHT: 161.69 LBS | TEMPERATURE: 98 F | BODY MASS INDEX: 27.6 KG/M2

## 2020-07-13 DIAGNOSIS — I10 ESSENTIAL HYPERTENSION: Primary | ICD-10-CM

## 2020-07-13 PROCEDURE — 99214 OFFICE O/P EST MOD 30 MIN: CPT | Performed by: FAMILY MEDICINE

## 2020-07-13 PROCEDURE — 99213 PR OFFICE/OUTPT VISIT, EST, LEVL III, 20-29 MIN: ICD-10-PCS | Mod: S$PBB,,, | Performed by: FAMILY MEDICINE

## 2020-07-13 PROCEDURE — 99213 OFFICE O/P EST LOW 20 MIN: CPT | Mod: S$PBB,,, | Performed by: FAMILY MEDICINE

## 2020-07-13 NOTE — PROGRESS NOTES
Subjective:       Patient ID: Patrice Smith is a 60 y.o. male.    Chief Complaint: Hypertension (one month follow now wearing heart monitor)      Patient is here to follow-up on blood pressure he reports that he has continued to have some episodes with a arrhythmias.  He had a negative Holter monitor and was seen by the cardiologist who ordered a continuous cardiac monitor and has a stress test pending.  BP Readings from Last 3 Encounters:  07/13/20 : (!) 142/88  06/05/20 : 130/80  05/05/20 : (!) 170/88  He has had several measurements at the cardiologist 122 over 70, and has been in the 130s at home.    Hypertension  This is a chronic problem. The problem is unchanged. The problem is controlled. Associated symptoms include palpitations. Pertinent negatives include no anxiety, blurred vision, chest pain, headaches, malaise/fatigue, neck pain, orthopnea, peripheral edema, PND, shortness of breath or sweats.       Allergies and Medications:   Review of patient's allergies indicates:  No Known Allergies  Current Outpatient Medications   Medication Sig Dispense Refill    aspirin 81 MG Chew Take 1 tablet (81 mg total) by mouth once daily.  0    fluticasone propionate (FLONASE) 50 mcg/actuation nasal spray USE 1 SPRAY IN EACH NOSTRIL TWICE A DAY AS NEEDED 48 g 5    irbesartan (AVAPRO) 150 MG tablet Take 1 tablet (150 mg total) by mouth once daily. 90 tablet 3    multivitamin capsule Take 1 capsule by mouth once daily.        pantoprazole (PROTONIX) 40 MG tablet TAKE 1 TABLET DAILY 90 tablet 4    rosuvastatin (CRESTOR) 10 MG tablet TAKE 1 TABLET DAILY 90 tablet 5    tadalafil (CIALIS) 20 MG Tab Take 1 tablet (20 mg total) by mouth twice a week. As needed 30 tablet 3    vitamin A-vit C-vit E-zinc-Cu Tab Take by mouth.       Current Facility-Administered Medications   Medication Dose Route Frequency Provider Last Rate Last Dose    methylPREDNISolone sod suc(PF) injection 40 mg  40 mg Intramuscular 1 time in  Clinic/HOD Ant Gallo MD           Family History:   Family History   Problem Relation Age of Onset    Parkinsonism Mother     Hypertension Mother     Ulcers Father     Arthritis Father     Diabetes Father     Hypertension Brother     Cancer Brother     Diabetes Brother     Anesthesia problems Neg Hx     Clotting disorder Neg Hx        Social History:   Social History     Socioeconomic History    Marital status:      Spouse name: Not on file    Number of children: Not on file    Years of education: Not on file    Highest education level: Not on file   Occupational History    Occupation: Warehouse Mgr.    Social Needs    Financial resource strain: Not hard at all    Food insecurity     Worry: Never true     Inability: Never true    Transportation needs     Medical: No     Non-medical: No   Tobacco Use    Smoking status: Never Smoker    Smokeless tobacco: Never Used   Substance and Sexual Activity    Alcohol use: Yes     Frequency: 2-4 times a month     Drinks per session: 3 or 4     Binge frequency: Less than monthly     Comment: social    Drug use: No    Sexual activity: Not on file   Lifestyle    Physical activity     Days per week: 5 days     Minutes per session: 20 min    Stress: To some extent   Relationships    Social connections     Talks on phone: Never     Gets together: Once a week     Attends Scientologist service: Not on file     Active member of club or organization: Yes     Attends meetings of clubs or organizations: More than 4 times per year     Relationship status:    Other Topics Concern    Not on file   Social History Narrative    Not on file       Review of Systems   Constitutional: Negative for malaise/fatigue.   Eyes: Negative for blurred vision.   Respiratory: Negative for shortness of breath.    Cardiovascular: Positive for palpitations. Negative for chest pain, orthopnea and PND.   Musculoskeletal: Negative for neck pain.   Neurological: Negative  for headaches.       Objective:     Vitals:    07/13/20 1433   BP: 120/70   Pulse:    Resp:    Temp:         Physical Exam  Vitals signs and nursing note reviewed.   Constitutional:       Appearance: He is well-developed. He is not diaphoretic.   HENT:      Head: Normocephalic.   Eyes:      Conjunctiva/sclera: Conjunctivae normal.      Pupils: Pupils are equal, round, and reactive to light.   Cardiovascular:      Rate and Rhythm: Normal rate and regular rhythm.      Heart sounds: Normal heart sounds. No murmur. No friction rub. No gallop.    Pulmonary:      Effort: Pulmonary effort is normal. No respiratory distress.      Breath sounds: Normal breath sounds. No stridor. No wheezing or rales.   Chest:      Chest wall: No tenderness.   Musculoskeletal:      Right lower leg: No edema.      Left lower leg: No edema.   Skin:     General: Skin is warm and dry.   Psychiatric:         Behavior: Behavior normal.         Thought Content: Thought content normal.         Judgment: Judgment normal.         Assessment:       1. Essential hypertension        Plan:       Patrice was seen today for hypertension.    Diagnoses and all orders for this visit:    Essential hypertension         Follow up in about 6 months (around 1/13/2021) for follow up cholesterol, follow up HTN.

## 2020-07-14 ENCOUNTER — CLINICAL SUPPORT (OUTPATIENT)
Dept: REHABILITATION | Facility: HOSPITAL | Age: 60
End: 2020-07-14
Payer: OTHER GOVERNMENT

## 2020-07-14 ENCOUNTER — HOSPITAL ENCOUNTER (OUTPATIENT)
Dept: RADIOLOGY | Facility: HOSPITAL | Age: 60
Discharge: HOME | End: 2020-07-14
Attending: SPECIALIST
Payer: OTHER GOVERNMENT

## 2020-07-14 DIAGNOSIS — R00.2 PALPITATIONS: Primary | ICD-10-CM

## 2020-07-14 DIAGNOSIS — M54.2 NECK PAIN ON LEFT SIDE: Primary | ICD-10-CM

## 2020-07-14 DIAGNOSIS — R00.2 PALPITATIONS: ICD-10-CM

## 2020-07-14 PROCEDURE — 97750 PHYSICAL PERFORMANCE TEST: CPT

## 2020-07-14 PROCEDURE — 71046 X-RAY EXAM CHEST 2 VIEWS: CPT | Mod: TC

## 2020-07-14 PROCEDURE — 97110 THERAPEUTIC EXERCISES: CPT

## 2020-07-14 NOTE — PROGRESS NOTES
[facility] OUTPATIENT  Physical Therapy Daily Treatment Note/Discharge     Name: Patrice Smith  Clinic Number: [id number]    Therapy Diagnosis:   Encounter Diagnosis   Name Primary?    Neck pain on left side Yes     Physician: Ant Gallo MD    Visit Date: 7/14/2020    Physician Orders: PT Eval and Treat   Medical Diagnosis from Referral: Strain of neck muscle, initial encounter     Evaluation Date: 6/4/2020  Authorization Period Expiration: 12/31/2020  Plan of Care Expiration: 7/31/2020  Visit # / Visits authorized: 10/ 104 (Freq 3w2, 2w4)       Total Visit count: 10    PTA Visit: 0/5        Time In: 0830  Time Out: 0915      Precautions: Standard      Subjective     Pt reports: He is doing well and has no pain, and has been compliant with his HEP.  He states that he has not had any pain over the last week, has had restful sleep as well as no issues with driving.      He was compliant with home exercise program.    Response to previous treatment: pain relief  Functional change: Non-remarkable      Pain: .0/10  Location: left neck  and shoulder        Objective       Temo received therapeutic exercises to develop strength, endurance, ROM, flexibility and posture for 30 minutes including:    · pulleys flex and scaption 2 min each held  · UBE x 6 min (3 fwd and 3 back)Held  · SNAGs 10 x 10 sec holds   · chin tucks 5 sec holds x 15 reps held  · chin tucks with fwd flexion 5 sec holds x 10 reps held  · cervical rotation iso 5 sec holds x 10 reps held  · seated with slight chin tuck multifidi iso 5 sec holds x 10 reps to the L only held  · Seated  Left UT stretch 30 sec x 3 held  · Seated Left levator stretch 30 se x 3 held  · Seated SCM stretch 3 x 30 L held  · Doorway pec stretch (2 positions) 30 sec x 2   ·  foam roller stretch with 1lb cuff weights at biceps x 4 min.  NP  · Seated scapular depressions on wall with arm lifts 10 x each   · Standing rows with Pink  theraband with 3 sec hold.  2 x 10  held  · Bruggers with PTB 2 x 10   · Supine cervical retractions with 3 sec hold- 10 x 2   · +Standing lower trap 2 x 10 reps 3 sec holds -held  · +Cybex rows 3 plates 10 x 3  · +Cybex Lat pulls 3 plates three positions 10 x 2          Temo received the following manual therapy techniques: STM were applied to the: L upper trap for 0 minutes      Temo participated in dynamic functional therapeutic activities/physical performance testing to improve functional performance for 15  minutes, including:    C-Spine      ROM                   C-Spine AROM AROM   Status Comment     Left Right                       Flexion 55 * - *         Extension 45 * - *         Sidebending 30 * NT         Rotation 60 * NT                                                                          MMT              Status Comment     Left Right                   Lower Trapezius 4/5 4/5       Middle Trapezius 4/5 4/5       Rhomboids 4+/5  4+/5                                                                 FUNCTIONAL TESTS                                                              Neck Disability Index: 1/50 = 2% Disability                      UEFS: 80/80 = 100.0%              Temo received the following direct contact modalities after being cleared for contraindications: Ultrasound to L upper trap 1.5 @ 100 % for a duration of 0 minutes. Therapist was in attendance throughout treatment.     Temo received the following supervised modalities after being cleared for contradictions: Mechanical Traction:  Patrice received intermittent mechanical traction to the cervical spine at a force of 24 pounds for a total of 30 seconds. Hold time of 10 seconds at a force of 12 pounds rest time, for a total of  0 mnutes Patient tolerated treatment well without any adverse effects.     Temo received cold pack for N/A minutes to N/A.      Home Exercises Provided and Patient Education Provided     Education provided:   - Patient  was issued HEP and educated on mode frequency reps, and sets as well as when to stop TE.  Patient verbalzied understanding, performed return demonstration and with no adverse affects noted nor verbalized.        Written Home Exercises Provided: yes.  Exercises were reviewed and Temo was able to demonstrate them prior to the end of the session.  Temo demonstrated fair  understanding of the education provided.     See EMR under Media for exercises provided 6/12/2020.    Discharge Assessment     Patient was initially evaluated on 6/4/2020 with an MD Dx of  Strain of neck muscle, initial encounter and had 10 follow up visits where he participated with PT to address his ROM, strength activity fabiola flexibility and function.  Since his initial evaluation he has made significant improvements.in all areas and this day he relates a disability of 2% as per the NDI and 100% function as per the UEFS. More over he has met all of his goals and is currently being discharged with his final HEP       Goals  Patient Will: STG/LTG Status   1a demonstrate initial HEP with use of handout prn in order to optimize Rx outcomes and max. Functional mob.in 4 visits. 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   STG Met 7/2/2020   3a Patient will improve cervical ROM for         L SB to 25* (IE 20*),      L  rotation to 40* (IE 35*)   in order to facilitate improved ability to drive and perform work related fork lift duties in 7 visits.   STG Met 7/2/2020   4a improve MMT grades for   B LT to 4-/5 (IE3+/5)   L MT to 4-/5 (IE 3+/5)   L Rhomboids to 4-/5 (IE 3+/5)   in order to improve/facilitate scapular stability and posture as well as UE support with lifting/reaching, ADLs- grooming, showering and day to day chores in 8 visits.   STG  Met 7/2/2020   5a  Decrease disability </= 15% (IE 22%) as per the NDI in order to improve QoL, ADLs, and ability to lift and reach in 7 visits. STG Met 7/2/2020    6a relate restful sleep with </= one incidence of disturbed sleep week in order to improve QoL, promote healing and function in 9 visits.   STG Met 7/2/2020    7a relate improved UE function as evidenced by the UEFS to >/= 90% (IE 86.25%) in order to improve reaching/lifting tasks with his day to day  And work related duties in 8 visits.   STG Met 7/2/2020                    1b demonstrate final HEP with use of handout prn in order to optimize Rx outcomes and max. Functional mob.by discharge. LTG Met 7/14/2020    2b Relate cervical pain </= 1/10 at worst over the previous 7 days in order to improve cervical ROM for improved QoL, ADL's, as well as to facilitate ability to drive and perform work related fork lift duties in 14 visits.   LTG Met 7/14/2020    3b  Patient will improve cervical ROM for     L SB to 30* (IE 20*),      L  rotation to 50* (IE 35*)   in order to facilitate improved ability to drive and perform work related fork lift duties in 14 visits.   LTG Met 7/14/2020    4b improve MMT grades for      B LT to 4/5 (IE3+/5)   B MT to 4/5 (IE 3+/5)   B Rhomboids to 4/5 (IE 3+/5)   in order to improve/facilitate scapular stability and posture as well as UE support with lifting/reaching, ADLs- grooming, showering and day to day chores in 14 visits.   LTG Met 7/14/2020    5b Decrease disability </= 5% (IE 22%) as per the NDI in order to improve QoL, ADLs  and ability to lift and reach in 14 visits. LTG Met 7/14/2020    6b   relate restful sleep with no incidences of disturbed sleep over the previous week in order to improve QoL, promote healing and function in 14 visits. LTG Met 7/14/2020    7b relate improved UE function as evidenced by the UEFS to >/= 95% (IE 86.25%) in order to improve reaching/lifting tasks with his day to day  And work related duties in 14 visits.  LTG Met 7/14/2020    8 relate imrpoved ability to drive with no exacerbation of pain over the previous week with looking over his B shoulders for improved safety and community mob. In 14 visits LTG  Met 7/14/2020                                                 Plan       Patient is discharged from [facility] with his final HEP.        Bob Mercado, PT

## 2020-07-27 DIAGNOSIS — I10 ESSENTIAL HYPERTENSION: ICD-10-CM

## 2020-07-27 RX ORDER — IRBESARTAN 150 MG/1
150 TABLET ORAL DAILY
Qty: 90 TABLET | Refills: 1 | Status: SHIPPED | OUTPATIENT
Start: 2020-07-27 | End: 2021-01-26

## 2020-09-30 ENCOUNTER — PATIENT MESSAGE (OUTPATIENT)
Dept: FAMILY MEDICINE | Facility: CLINIC | Age: 60
End: 2020-09-30

## 2020-09-30 DIAGNOSIS — R73.02 IGT (IMPAIRED GLUCOSE TOLERANCE): ICD-10-CM

## 2020-09-30 DIAGNOSIS — E78.2 MULTIPLE-TYPE HYPERLIPIDEMIA: ICD-10-CM

## 2020-09-30 DIAGNOSIS — I10 ESSENTIAL HYPERTENSION: ICD-10-CM

## 2020-09-30 DIAGNOSIS — Z12.5 SCREENING FOR PROSTATE CANCER: ICD-10-CM

## 2020-09-30 DIAGNOSIS — R79.89 HIGH THYROID STIMULATING HORMONE (TSH) LEVEL: ICD-10-CM

## 2020-09-30 DIAGNOSIS — Z00.00 WELL ADULT EXAM: Primary | ICD-10-CM

## 2020-10-07 ENCOUNTER — TELEPHONE (OUTPATIENT)
Dept: FAMILY MEDICINE | Facility: CLINIC | Age: 60
End: 2020-10-07

## 2020-10-07 LAB
ALBUMIN SERPL-MCNC: 5.3 G/DL (ref 3.8–4.9)
ALBUMIN/GLOB SERPL: 2.7 {RATIO} (ref 1.2–2.2)
ALP SERPL-CCNC: 60 IU/L (ref 39–117)
ALT SERPL-CCNC: 47 IU/L (ref 0–44)
AST SERPL-CCNC: 32 IU/L (ref 0–40)
BILIRUB SERPL-MCNC: 0.4 MG/DL (ref 0–1.2)
BUN SERPL-MCNC: 23 MG/DL (ref 8–27)
BUN/CREAT SERPL: 24 (ref 10–24)
CALCIUM SERPL-MCNC: 9.9 MG/DL (ref 8.6–10.2)
CHLORIDE SERPL-SCNC: 105 MMOL/L (ref 96–106)
CHOLEST SERPL-MCNC: 140 MG/DL (ref 100–199)
CO2 SERPL-SCNC: 24 MMOL/L (ref 20–29)
CREAT SERPL-MCNC: 0.95 MG/DL (ref 0.76–1.27)
GLOBULIN SER CALC-MCNC: 2 G/DL (ref 1.5–4.5)
GLUCOSE SERPL-MCNC: 109 MG/DL (ref 65–99)
HDLC SERPL-MCNC: 57 MG/DL
LDLC SERPL CALC-MCNC: 65 MG/DL (ref 0–99)
POTASSIUM SERPL-SCNC: 5.1 MMOL/L (ref 3.5–5.2)
PROT SERPL-MCNC: 7.3 G/DL (ref 6–8.5)
PSA SERPL-MCNC: 1.6 NG/ML (ref 0–4)
SODIUM SERPL-SCNC: 143 MMOL/L (ref 134–144)
T4 SERPL-MCNC: 8 UG/DL (ref 4.5–12)
TRIGL SERPL-MCNC: 95 MG/DL (ref 0–149)
TSH SERPL DL<=0.005 MIU/L-ACNC: 5.11 UIU/ML (ref 0.45–4.5)
VLDLC SERPL CALC-MCNC: 18 MG/DL (ref 5–40)

## 2020-10-07 NOTE — TELEPHONE ENCOUNTER
Advised patient that the TSH is slightly elevated but the T4 is normal I do not feel this is true hypothyroidism.  Will continue to monitor. All other labs were normal.  Patient gave verbal understanding of recommendations and has an appointment next week

## 2020-10-07 NOTE — TELEPHONE ENCOUNTER
----- Message from Ant Gallo MD sent at 10/7/2020  8:14 AM CDT -----  The TSH is slightly elevated but the T4 is normal I do not feel this is true hypothyroidism.  Will continue to monitor.

## 2020-10-14 ENCOUNTER — OFFICE VISIT (OUTPATIENT)
Dept: FAMILY MEDICINE | Facility: CLINIC | Age: 60
End: 2020-10-14
Payer: OTHER GOVERNMENT

## 2020-10-14 VITALS
DIASTOLIC BLOOD PRESSURE: 78 MMHG | HEIGHT: 64 IN | TEMPERATURE: 97 F | OXYGEN SATURATION: 99 % | BODY MASS INDEX: 28.17 KG/M2 | HEART RATE: 52 BPM | WEIGHT: 165 LBS | SYSTOLIC BLOOD PRESSURE: 132 MMHG | RESPIRATION RATE: 18 BRPM

## 2020-10-14 DIAGNOSIS — I10 ESSENTIAL HYPERTENSION: ICD-10-CM

## 2020-10-14 DIAGNOSIS — Z23 IMMUNIZATION DUE: ICD-10-CM

## 2020-10-14 DIAGNOSIS — Z00.00 PREVENTATIVE HEALTH CARE: Primary | ICD-10-CM

## 2020-10-14 DIAGNOSIS — Z12.11 SCREENING FOR COLON CANCER: ICD-10-CM

## 2020-10-14 DIAGNOSIS — E78.2 MULTIPLE-TYPE HYPERLIPIDEMIA: ICD-10-CM

## 2020-10-14 DIAGNOSIS — Z12.5 SCREENING FOR PROSTATE CANCER: ICD-10-CM

## 2020-10-14 PROCEDURE — 99396 PR PREVENTIVE VISIT,EST,40-64: ICD-10-PCS | Mod: S$PBB,,, | Performed by: FAMILY MEDICINE

## 2020-10-14 PROCEDURE — 90471 IMMUNIZATION ADMIN: CPT | Mod: PBBFAC | Performed by: FAMILY MEDICINE

## 2020-10-14 PROCEDURE — 99215 OFFICE O/P EST HI 40 MIN: CPT | Performed by: FAMILY MEDICINE

## 2020-10-14 PROCEDURE — 99396 PREV VISIT EST AGE 40-64: CPT | Mod: S$PBB,,, | Performed by: FAMILY MEDICINE

## 2020-10-14 RX ORDER — ZOSTER VACCINE RECOMBINANT, ADJUVANTED 50 MCG/0.5
0.5 KIT INTRAMUSCULAR ONCE
Qty: 1 EACH | Refills: 0 | Status: SHIPPED | OUTPATIENT
Start: 2020-10-14 | End: 2020-10-14

## 2020-10-14 NOTE — PROGRESS NOTES
Subjective:       Patient ID: Patrice Smith is a 60 y.o. male.    Chief Complaint: Annual Exam      Patient is here for his annual well visit.  .Patient Active Problem List:     High thyroid stimulating hormone (TSH) level     Gastroesophageal reflux disease without esophagitis     Hypertension     Multiple-type hyperlipidemia     Obstructive sleep apnea syndrome     Onychomycosis of toenail     Terminal esophageal web     Perennial allergic rhinitis with seasonal variation     Family history of prostate cancer     Other male erectile dysfunction     Overweight     IGT (impaired glucose tolerance)     Immunization due     Palpitation     Essential hypertension    Patient had was having palpitations and had a stress test in July which was negative.  He did have a continuous blood pressure monitor for month which read normal each time.  Continues to use auto PAP at night for sleep apnea.  Lab Results       Component                Value               Date                       WBC                      4.0                 04/02/2019                 HGB                      15.1                04/02/2019                 HCT                      44.7                04/02/2019                 PLT                      216                 04/02/2019                 CHOL                     140                 10/06/2020                 TRIG                     95                  10/06/2020                 HDL                      57                  10/06/2020                 ALT                      47 (H)              10/06/2020                 AST                      32                  10/06/2020                 NA                       143                 10/06/2020                 K                        5.1                 10/06/2020                 CL                       105                 10/06/2020                 CREATININE               0.95                10/06/2020                 BUN                       23                  10/06/2020                 CO2                      24                  10/06/2020                 TSH                      5.110 (H)           10/06/2020                 PSA                      1.6                 02/06/2014                 HGBA1C                   5.4                 10/05/2018                 MICROALBUR               <3.0                04/02/2019                  Allergies and Medications:   Review of patient's allergies indicates:  No Known Allergies  Current Outpatient Medications   Medication Sig Dispense Refill    aspirin 81 MG Chew Take 1 tablet (81 mg total) by mouth once daily.  0    fluticasone propionate (FLONASE) 50 mcg/actuation nasal spray USE 1 SPRAY IN EACH NOSTRIL TWICE A DAY AS NEEDED 48 g 5    irbesartan (AVAPRO) 150 MG tablet Take 1 tablet (150 mg total) by mouth once daily. 90 tablet 1    multivitamin capsule Take 1 capsule by mouth once daily.        pantoprazole (PROTONIX) 40 MG tablet TAKE 1 TABLET DAILY 90 tablet 4    rosuvastatin (CRESTOR) 10 MG tablet TAKE 1 TABLET DAILY 90 tablet 5    tadalafil (CIALIS) 20 MG Tab Take 1 tablet (20 mg total) by mouth twice a week. As needed 30 tablet 3    vitamin A-vit C-vit E-zinc-Cu Tab Take by mouth.      varicella-zoster gE-AS01B, PF, (SHINGRIX, PF,) 50 mcg/0.5 mL injection Inject 0.5 mLs into the muscle once. for 1 dose 1 each 0     Current Facility-Administered Medications   Medication Dose Route Frequency Provider Last Rate Last Dose    methylPREDNISolone sod suc(PF) injection 40 mg  40 mg Intramuscular 1 time in Clinic/HOD Ant Gallo MD           Family History:   Family History   Problem Relation Age of Onset    Parkinsonism Mother     Hypertension Mother     Ulcers Father     Arthritis Father     Diabetes Father     Hypertension Brother     Cancer Brother     Diabetes Brother     Anesthesia problems Neg Hx     Clotting disorder Neg Hx        Social History:   Social History      Socioeconomic History    Marital status:      Spouse name: Not on file    Number of children: Not on file    Years of education: Not on file    Highest education level: Not on file   Occupational History    Occupation: Warehouse Mgr.    Social Needs    Financial resource strain: Not hard at all    Food insecurity     Worry: Never true     Inability: Never true    Transportation needs     Medical: No     Non-medical: No   Tobacco Use    Smoking status: Never Smoker    Smokeless tobacco: Never Used   Substance and Sexual Activity    Alcohol use: Yes     Frequency: 2-4 times a month     Drinks per session: 3 or 4     Binge frequency: Less than monthly     Comment: social    Drug use: No    Sexual activity: Not on file   Lifestyle    Physical activity     Days per week: 5 days     Minutes per session: 20 min    Stress: To some extent   Relationships    Social connections     Talks on phone: Never     Gets together: Once a week     Attends Temple service: Not on file     Active member of club or organization: Yes     Attends meetings of clubs or organizations: More than 4 times per year     Relationship status:    Other Topics Concern    Not on file   Social History Narrative    Not on file       Review of Systems   Constitutional: Positive for activity change (Patient runs on the treadmill and works out regularly.  Has a normal resting bradycardia). Negative for appetite change, chills, diaphoresis, fatigue, fever and unexpected weight change.   HENT: Negative for congestion, dental problem, drooling, ear discharge, ear pain, facial swelling, hearing loss, mouth sores, nosebleeds, postnasal drip, rhinorrhea, sinus pressure, sinus pain, sneezing, sore throat, tinnitus, trouble swallowing and voice change.    Eyes: Negative for photophobia, pain, discharge, redness, itching and visual disturbance.   Respiratory: Negative for apnea, cough, choking, chest tightness, shortness of  breath, wheezing and stridor.    Cardiovascular: Negative for chest pain, palpitations and leg swelling.        Palpitations workup was completely negative.  He does have a resting sinus bradycardia from athletic heart.   Gastrointestinal: Negative for abdominal distention, abdominal pain, anal bleeding, blood in stool, constipation, diarrhea, nausea, rectal pain and vomiting.   Endocrine: Negative for cold intolerance, heat intolerance, polydipsia, polyphagia and polyuria.   Genitourinary: Negative for decreased urine volume, difficulty urinating, discharge, dysuria, enuresis, flank pain, frequency, genital sores, hematuria, penile pain, penile swelling, scrotal swelling, testicular pain and urgency.        No nocturia or other prostate related issues.   Musculoskeletal: Negative for arthralgias, back pain, gait problem, joint swelling, myalgias, neck pain and neck stiffness.   Skin: Negative for color change, pallor, rash and wound.   Allergic/Immunologic: Negative for environmental allergies, food allergies and immunocompromised state.   Neurological: Negative for dizziness, tremors, seizures, syncope, facial asymmetry, speech difficulty, weakness, light-headedness, numbness and headaches.   Hematological: Negative for adenopathy. Does not bruise/bleed easily.   Psychiatric/Behavioral: Negative for agitation, behavioral problems, confusion, decreased concentration, dysphoric mood, hallucinations, self-injury, sleep disturbance and suicidal ideas. The patient is not nervous/anxious and is not hyperactive.        Objective:     Vitals:    10/14/20 0831   BP: 132/78   Pulse: (!) 52   Resp: 18   Temp: 97.2 °F (36.2 °C)        Physical Exam  Vitals signs and nursing note reviewed.   Constitutional:       General: He is not in acute distress.     Appearance: Normal appearance. He is well-developed and normal weight. He is not ill-appearing, toxic-appearing or diaphoretic.   HENT:      Head: Normocephalic and  atraumatic.      Right Ear: Tympanic membrane, ear canal and external ear normal. There is no impacted cerumen.      Left Ear: Tympanic membrane, ear canal and external ear normal. There is no impacted cerumen.      Nose: Nose normal. No congestion or rhinorrhea.      Mouth/Throat:      Mouth: Mucous membranes are moist.      Pharynx: Oropharynx is clear. No oropharyngeal exudate or posterior oropharyngeal erythema.   Eyes:      General: No scleral icterus.        Right eye: No discharge.         Left eye: No discharge.      Extraocular Movements: Extraocular movements intact.      Conjunctiva/sclera: Conjunctivae normal.      Pupils: Pupils are equal, round, and reactive to light.   Neck:      Musculoskeletal: Normal range of motion and neck supple. No neck rigidity or muscular tenderness.      Thyroid: No thyromegaly.      Vascular: No carotid bruit or JVD.      Trachea: No tracheal deviation.   Cardiovascular:      Rate and Rhythm: Normal rate.      Heart sounds: Normal heart sounds. No murmur. No friction rub. No gallop.    Pulmonary:      Effort: Pulmonary effort is normal. No respiratory distress.      Breath sounds: Normal breath sounds. No stridor. No wheezing, rhonchi or rales.   Chest:      Chest wall: No tenderness.   Abdominal:      General: Bowel sounds are normal. There is no distension.      Palpations: Abdomen is soft. There is no mass.      Tenderness: There is no abdominal tenderness. There is no right CVA tenderness, left CVA tenderness, guarding or rebound.      Hernia: No hernia is present.   Genitourinary:     Penis: Normal and circumcised. No phimosis, paraphimosis, hypospadias, erythema, tenderness or discharge.       Scrotum/Testes: Normal. Cremasteric reflex is present.         Right: Mass, tenderness or swelling not present. Right testis is descended. Cremasteric reflex is present.          Left: Mass, tenderness or swelling not present. Left testis is descended. Cremasteric reflex is  present.       Rectum: Normal.   Musculoskeletal:         General: No swelling, tenderness, deformity or signs of injury.      Right lower leg: No edema.      Left lower leg: No edema.   Lymphadenopathy:      Cervical: No cervical adenopathy.   Skin:     General: Skin is warm and dry.      Capillary Refill: Capillary refill takes less than 2 seconds.      Coloration: Skin is not jaundiced or pale.      Findings: No bruising, erythema, lesion or rash.      Comments: Patient has actinic changes across the face at the hairline skin survey of the back was negative. Sees Dr. Zelaya in once here.   Neurological:      General: No focal deficit present.      Mental Status: He is alert and oriented to person, place, and time.      Cranial Nerves: No cranial nerve deficit.      Sensory: No sensory deficit.      Motor: No weakness or abnormal muscle tone.      Coordination: Coordination normal.      Gait: Gait normal.      Deep Tendon Reflexes: Reflexes are normal and symmetric. Reflexes normal.   Psychiatric:         Behavior: Behavior normal.         Thought Content: Thought content normal.         Judgment: Judgment normal.         Assessment:       1. Preventative health care    2. Multiple-type hyperlipidemia    3. Essential hypertension    4. Screening for prostate cancer    5. Screening for colon cancer    6. Immunization due        Plan:       Patrice was seen today for annual exam.    Diagnoses and all orders for this visit:    Preventative health care  -     HIV 1/2 Ag/Ab (4th Gen); Future  -     Ambulatory referral/consult to Ophthalmology; Future    Multiple-type hyperlipidemia    Essential hypertension  -     Microalbumin/Creatinine Ratio, Urine; Future  -     Ambulatory referral/consult to Ophthalmology; Future    Screening for prostate cancer  -     PSA, Screening; Future    Screening for colon cancer  -     Ambulatory referral/consult to Gastroenterology; Future    Immunization due  -     Influenza - Quadrivalent  (PF)  -     varicella-zoster gE-AS01B, PF, (SHINGRIX, PF,) 50 mcg/0.5 mL injection; Inject 0.5 mLs into the muscle once. for 1 dose         Follow up in about 6 months (around 4/14/2021) for follow up cholesterol, follow up HTN.

## 2020-12-07 ENCOUNTER — OFFICE VISIT (OUTPATIENT)
Dept: UROLOGY | Facility: CLINIC | Age: 60
End: 2020-12-07
Payer: OTHER GOVERNMENT

## 2020-12-07 VITALS
WEIGHT: 167.75 LBS | DIASTOLIC BLOOD PRESSURE: 83 MMHG | SYSTOLIC BLOOD PRESSURE: 160 MMHG | BODY MASS INDEX: 28.64 KG/M2 | HEIGHT: 64 IN | HEART RATE: 58 BPM

## 2020-12-07 DIAGNOSIS — N52.8 OTHER MALE ERECTILE DYSFUNCTION: Primary | ICD-10-CM

## 2020-12-07 DIAGNOSIS — Z80.42 FAMILY HISTORY OF PROSTATE CANCER: ICD-10-CM

## 2020-12-07 PROCEDURE — 99213 OFFICE O/P EST LOW 20 MIN: CPT | Mod: PBBFAC | Performed by: UROLOGY

## 2020-12-07 PROCEDURE — 99999 PR PBB SHADOW E&M-EST. PATIENT-LVL III: ICD-10-PCS | Mod: PBBFAC,,, | Performed by: UROLOGY

## 2020-12-07 PROCEDURE — 99213 OFFICE O/P EST LOW 20 MIN: CPT | Mod: S$PBB,,, | Performed by: UROLOGY

## 2020-12-07 PROCEDURE — 81002 URINALYSIS NONAUTO W/O SCOPE: CPT | Mod: PBBFAC | Performed by: UROLOGY

## 2020-12-07 PROCEDURE — 99213 PR OFFICE/OUTPT VISIT, EST, LEVL III, 20-29 MIN: ICD-10-PCS | Mod: S$PBB,,, | Performed by: UROLOGY

## 2020-12-07 PROCEDURE — 99999 PR PBB SHADOW E&M-EST. PATIENT-LVL III: CPT | Mod: PBBFAC,,, | Performed by: UROLOGY

## 2020-12-07 NOTE — PROGRESS NOTES
CHIEF COMPLAINT:    Mr. Smith is a 60 y.o. male presenting for a follow up on family history of prostate cancer, and erectile dysfunction    PRESENTING ILLNESS:    Patrice Smith is a 60 y.o. male who returns for follow-up.  He states that he has not had any new diagnoses or surgeries since he was last seen in February.  He is satisfied with the manner in which he is urinating.  He continues to use Cialis half a tablet of 20 mg as needed for intercourse.  He states he does not need a refill at this time.    REVIEW OF SYSTEMS:    Review of Systems   Constitutional: Negative.    HENT: Negative.    Eyes: Negative.    Respiratory: Negative.    Cardiovascular: Negative.    Gastrointestinal: Positive for heartburn.   Musculoskeletal: Negative.    Skin: Negative.    Neurological: Negative.    Endo/Heme/Allergies: Negative.    Psychiatric/Behavioral: Negative.        PATIENT HISTORY:    Past Medical History:   Diagnosis Date    Acid reflux     Hyperlipidemia     Hypertension     Perennial allergic rhinitis with seasonal variation        History reviewed. No pertinent surgical history.    Family History   Problem Relation Age of Onset    Parkinsonism Mother     Hypertension Mother     Ulcers Father     Arthritis Father     Diabetes Father     Hypertension Brother     Cancer Brother     Diabetes Brother      Socioeconomic History    Marital status:    Occupational History    Occupation: Warehouse Mgr.    Social Needs    Financial resource strain: Not hard at all    Food insecurity     Worry: Never true     Inability: Never true    Transportation needs     Medical: No     Non-medical: No   Tobacco Use    Smoking status: Never Smoker    Smokeless tobacco: Never Used   Substance and Sexual Activity    Alcohol use: Yes     Frequency: 2-4 times a month     Drinks per session: 3 or 4     Binge frequency: Less than monthly     Comment: social    Drug use: No    Sexual activity: Not on file   Lifestyle     Physical activity     Days per week: 5 days     Minutes per session: 20 min    Stress: To some extent   Relationships    Social connections     Talks on phone: Never     Gets together: Once a week     Attends [employer] service: Not on file     Active member of club or organization: Yes     Attends meetings of clubs or organizations: More than 4 times per year     Relationship status:        Allergies:  Patient has no known allergies.    Medications:  Outpatient Encounter Medications as of 12/7/2020   Medication Sig Dispense Refill    fluticasone propionate (FLONASE) 50 mcg/actuation nasal spray USE 1 SPRAY IN EACH NOSTRIL TWICE A DAY AS NEEDED 48 g 5    irbesartan (AVAPRO) 150 MG tablet Take 1 tablet (150 mg total) by mouth once daily. 90 tablet 1    multivitamin capsule Take 1 capsule by mouth once daily.        pantoprazole (PROTONIX) 40 MG tablet TAKE 1 TABLET DAILY 90 tablet 3    rosuvastatin (CRESTOR) 10 MG tablet TAKE 1 TABLET DAILY 90 tablet 5    tadalafil (CIALIS) 20 MG Tab Take 1 tablet (20 mg total) by mouth twice a week. As needed 30 tablet 3    vitamin A-vit C-vit E-zinc-Cu Tab Take by mouth.      aspirin 81 MG Chew Take 1 tablet (81 mg total) by mouth once daily.  0     Facility-Administered Encounter Medications as of 12/7/2020   Medication Dose Route Frequency Provider Last Rate Last Dose    methylPREDNISolone sod suc(PF) injection 40 mg  40 mg Intramuscular 1 time in Clinic/HOD Ant Gallo MD             PHYSICAL EXAMINATION:    The patient generally appears in good health, is appropriately interactive, and is in no apparent distress.    Skin: No lesions.    Mental: Cooperative with normal affect.    Neuro: Grossly intact.    HEENT: Normal. No evidence of lymphadenopathy.    Chest:  normal inspiratory effort.    Abdomen: Soft, non-tender. No masses or organomegaly. Bladder is not palpable. No evidence of flank discomfort. No evidence of inguinal hernia.    Extremities: No  clubbing, cyanosis, or edema      LABS:    Lab Results   Component Value Date    BUN 23 10/06/2020    CREATININE 0.95 10/06/2020     Lab Results   Component Value Date    PSA 1.6 02/06/2014    PSA 1.72 11/19/2012    PSA 1.4 01/06/2011    PSADIAG 1.2 09/28/2018    PSADIAG 1.2 09/21/2017     10/6/2020 1.6 ng/ml (lab [facility])    UA specific gravity 1.005, pH 7, otherwise negative.    IMPRESSION:    Encounter Diagnoses   Name Primary?    Other male erectile dysfunction Yes    Family history of prostate cancer        PLAN:    1.  Follow-up in 1 year

## 2020-12-15 ENCOUNTER — CLINICAL SUPPORT (OUTPATIENT)
Dept: CARDIOLOGY | Facility: HOSPITAL | Age: 60
End: 2020-12-15
Attending: STUDENT IN AN ORGANIZED HEALTH CARE EDUCATION/TRAINING PROGRAM
Payer: OTHER GOVERNMENT

## 2020-12-15 ENCOUNTER — HOSPITAL ENCOUNTER (OUTPATIENT)
Facility: HOSPITAL | Age: 60
Discharge: HOME | End: 2020-12-16
Attending: EMERGENCY MEDICINE | Admitting: STUDENT IN AN ORGANIZED HEALTH CARE EDUCATION/TRAINING PROGRAM
Payer: OTHER GOVERNMENT

## 2020-12-15 VITALS — HEIGHT: 64 IN | BODY MASS INDEX: 28.17 KG/M2 | WEIGHT: 165 LBS

## 2020-12-15 DIAGNOSIS — R07.9 CHEST PAIN: ICD-10-CM

## 2020-12-15 DIAGNOSIS — I47.10 SVT (SUPRAVENTRICULAR TACHYCARDIA): ICD-10-CM

## 2020-12-15 DIAGNOSIS — R00.2 PALPITATIONS: Primary | ICD-10-CM

## 2020-12-15 DIAGNOSIS — I48.91 A-FIB: ICD-10-CM

## 2020-12-15 DIAGNOSIS — I48.92 NEW ONSET ATRIAL FLUTTER: ICD-10-CM

## 2020-12-15 DIAGNOSIS — I48.92 ATRIAL FLUTTER: ICD-10-CM

## 2020-12-15 LAB
ALBUMIN SERPL BCP-MCNC: 5 G/DL (ref 3.5–5.2)
ALP SERPL-CCNC: 50 U/L (ref 55–135)
ALT SERPL W/O P-5'-P-CCNC: 68 U/L (ref 10–44)
AMPHET+METHAMPHET UR QL: NEGATIVE
ANION GAP SERPL CALC-SCNC: 12 MMOL/L (ref 8–16)
AST SERPL-CCNC: 39 U/L (ref 10–40)
BARBITURATES UR QL SCN>200 NG/ML: NEGATIVE
BASOPHILS # BLD AUTO: 0.03 K/UL (ref 0–0.2)
BASOPHILS NFR BLD: 0.6 % (ref 0–1.9)
BENZODIAZ UR QL SCN>200 NG/ML: NEGATIVE
BILIRUB SERPL-MCNC: 1.1 MG/DL (ref 0.1–1)
BILIRUB UR QL STRIP: NEGATIVE
BUN SERPL-MCNC: 22 MG/DL (ref 6–20)
BZE UR QL SCN: NEGATIVE
CALCIUM SERPL-MCNC: 9.6 MG/DL (ref 8.7–10.5)
CANNABINOIDS UR QL SCN: NEGATIVE
CHLORIDE SERPL-SCNC: 105 MMOL/L (ref 95–110)
CLARITY UR: CLEAR
CO2 SERPL-SCNC: 21 MMOL/L (ref 23–29)
COLOR UR: COLORLESS
CREAT SERPL-MCNC: 1 MG/DL (ref 0.5–1.4)
CREAT UR-MCNC: 18 MG/DL (ref 23–375)
D DIMER PPP IA.FEU-MCNC: <0.27 UG/ML FEU
DIFFERENTIAL METHOD: ABNORMAL
EOSINOPHIL # BLD AUTO: 0.1 K/UL (ref 0–0.5)
EOSINOPHIL NFR BLD: 1.4 % (ref 0–8)
ERYTHROCYTE [DISTWIDTH] IN BLOOD BY AUTOMATED COUNT: 12 % (ref 11.5–14.5)
EST. GFR  (AFRICAN AMERICAN): >60 ML/MIN/1.73 M^2
EST. GFR  (NON AFRICAN AMERICAN): >60 ML/MIN/1.73 M^2
GLUCOSE SERPL-MCNC: 126 MG/DL (ref 70–110)
GLUCOSE UR QL STRIP: NEGATIVE
HCT VFR BLD AUTO: 49.3 % (ref 40–54)
HGB BLD-MCNC: 17.4 G/DL (ref 14–18)
HGB UR QL STRIP: NEGATIVE
IMM GRANULOCYTES # BLD AUTO: 0.02 K/UL (ref 0–0.04)
IMM GRANULOCYTES NFR BLD AUTO: 0.4 % (ref 0–0.5)
KETONES UR QL STRIP: NEGATIVE
LEUKOCYTE ESTERASE UR QL STRIP: NEGATIVE
LYMPHOCYTES # BLD AUTO: 2.1 K/UL (ref 1–4.8)
LYMPHOCYTES NFR BLD: 41.6 % (ref 18–48)
MAGNESIUM SERPL-MCNC: 2.1 MG/DL (ref 1.6–2.6)
MCH RBC QN AUTO: 34.3 PG (ref 27–31)
MCHC RBC AUTO-ENTMCNC: 35.3 G/DL (ref 32–36)
MCV RBC AUTO: 97 FL (ref 82–98)
MONOCYTES # BLD AUTO: 0.4 K/UL (ref 0.3–1)
MONOCYTES NFR BLD: 7 % (ref 4–15)
NEUTROPHILS # BLD AUTO: 2.5 K/UL (ref 1.8–7.7)
NEUTROPHILS NFR BLD: 49 % (ref 38–73)
NITRITE UR QL STRIP: NEGATIVE
NRBC BLD-RTO: 0 /100 WBC
OPIATES UR QL SCN: NEGATIVE
PCP UR QL SCN>25 NG/ML: NEGATIVE
PH UR STRIP: 8 [PH] (ref 5–8)
PLATELET # BLD AUTO: 216 K/UL (ref 150–350)
PMV BLD AUTO: 11.3 FL (ref 9.2–12.9)
POTASSIUM SERPL-SCNC: 4 MMOL/L (ref 3.5–5.1)
PROT SERPL-MCNC: 7.5 G/DL (ref 6–8.4)
PROT UR QL STRIP: NEGATIVE
RBC # BLD AUTO: 5.08 M/UL (ref 4.6–6.2)
SARS-COV-2 RDRP RESP QL NAA+PROBE: NEGATIVE
SODIUM SERPL-SCNC: 138 MMOL/L (ref 136–145)
SP GR UR STRIP: 1 (ref 1–1.03)
TOXICOLOGY INFORMATION: ABNORMAL
TROPONIN I SERPL DL<=0.01 NG/ML-MCNC: <0.03 NG/ML
TSH SERPL DL<=0.005 MIU/L-ACNC: 4.61 UIU/ML (ref 0.34–5.6)
URN SPEC COLLECT METH UR: ABNORMAL
UROBILINOGEN UR STRIP-ACNC: NEGATIVE EU/DL
WBC # BLD AUTO: 5.14 K/UL (ref 3.9–12.7)

## 2020-12-15 PROCEDURE — 93010 ELECTROCARDIOGRAM REPORT: CPT | Mod: 76,,, | Performed by: INTERNAL MEDICINE

## 2020-12-15 PROCEDURE — G0378 HOSPITAL OBSERVATION PER HR: HCPCS

## 2020-12-15 PROCEDURE — 85025 COMPLETE CBC W/AUTO DIFF WBC: CPT

## 2020-12-15 PROCEDURE — 94761 N-INVAS EAR/PLS OXIMETRY MLT: CPT

## 2020-12-15 PROCEDURE — 80053 COMPREHEN METABOLIC PANEL: CPT

## 2020-12-15 PROCEDURE — 99291 CRITICAL CARE FIRST HOUR: CPT

## 2020-12-15 PROCEDURE — 93010 ELECTROCARDIOGRAM REPORT: CPT | Mod: ,,, | Performed by: INTERNAL MEDICINE

## 2020-12-15 PROCEDURE — 25000003 PHARM REV CODE 250: Performed by: STUDENT IN AN ORGANIZED HEALTH CARE EDUCATION/TRAINING PROGRAM

## 2020-12-15 PROCEDURE — 93010 EKG 12-LEAD: ICD-10-PCS | Mod: 76,,, | Performed by: INTERNAL MEDICINE

## 2020-12-15 PROCEDURE — 93306 ECHO (CUPID ONLY): ICD-10-PCS | Mod: 26,,, | Performed by: INTERNAL MEDICINE

## 2020-12-15 PROCEDURE — 63600175 PHARM REV CODE 636 W HCPCS: Performed by: STUDENT IN AN ORGANIZED HEALTH CARE EDUCATION/TRAINING PROGRAM

## 2020-12-15 PROCEDURE — 36415 COLL VENOUS BLD VENIPUNCTURE: CPT

## 2020-12-15 PROCEDURE — 81003 URINALYSIS AUTO W/O SCOPE: CPT | Mod: 59

## 2020-12-15 PROCEDURE — 96361 HYDRATE IV INFUSION ADD-ON: CPT

## 2020-12-15 PROCEDURE — 85379 FIBRIN DEGRADATION QUANT: CPT

## 2020-12-15 PROCEDURE — 99900035 HC TECH TIME PER 15 MIN (STAT)

## 2020-12-15 PROCEDURE — 80307 DRUG TEST PRSMV CHEM ANLYZR: CPT

## 2020-12-15 PROCEDURE — 83735 ASSAY OF MAGNESIUM: CPT

## 2020-12-15 PROCEDURE — 93306 TTE W/DOPPLER COMPLETE: CPT

## 2020-12-15 PROCEDURE — 84443 ASSAY THYROID STIM HORMONE: CPT

## 2020-12-15 PROCEDURE — 84484 ASSAY OF TROPONIN QUANT: CPT

## 2020-12-15 PROCEDURE — 84484 ASSAY OF TROPONIN QUANT: CPT | Mod: 91

## 2020-12-15 PROCEDURE — 25000003 PHARM REV CODE 250: Performed by: EMERGENCY MEDICINE

## 2020-12-15 PROCEDURE — 96374 THER/PROPH/DIAG INJ IV PUSH: CPT | Mod: 59

## 2020-12-15 PROCEDURE — 93005 ELECTROCARDIOGRAM TRACING: CPT | Performed by: INTERNAL MEDICINE

## 2020-12-15 PROCEDURE — 96372 THER/PROPH/DIAG INJ SC/IM: CPT | Mod: 59

## 2020-12-15 PROCEDURE — 93306 TTE W/DOPPLER COMPLETE: CPT | Mod: 26,,, | Performed by: INTERNAL MEDICINE

## 2020-12-15 PROCEDURE — U0002 COVID-19 LAB TEST NON-CDC: HCPCS

## 2020-12-15 PROCEDURE — 83036 HEMOGLOBIN GLYCOSYLATED A1C: CPT

## 2020-12-15 RX ORDER — MAGNESIUM SULFATE 1 G/100ML
1 INJECTION INTRAVENOUS
Status: DISCONTINUED | OUTPATIENT
Start: 2020-12-15 | End: 2020-12-16 | Stop reason: HOSPADM

## 2020-12-15 RX ORDER — ACETAMINOPHEN 325 MG/1
650 TABLET ORAL EVERY 4 HOURS PRN
Status: DISCONTINUED | OUTPATIENT
Start: 2020-12-15 | End: 2020-12-16 | Stop reason: HOSPADM

## 2020-12-15 RX ORDER — ENOXAPARIN SODIUM 100 MG/ML
1 INJECTION SUBCUTANEOUS
Status: DISCONTINUED | OUTPATIENT
Start: 2020-12-15 | End: 2020-12-16 | Stop reason: HOSPADM

## 2020-12-15 RX ORDER — IRBESARTAN 150 MG/1
150 TABLET ORAL DAILY
Status: DISCONTINUED | OUTPATIENT
Start: 2020-12-16 | End: 2020-12-16 | Stop reason: HOSPADM

## 2020-12-15 RX ORDER — MAGNESIUM SULFATE HEPTAHYDRATE 40 MG/ML
2 INJECTION, SOLUTION INTRAVENOUS
Status: DISCONTINUED | OUTPATIENT
Start: 2020-12-15 | End: 2020-12-16 | Stop reason: HOSPADM

## 2020-12-15 RX ORDER — CALCIUM CHLORIDE IN 0.9 % NACL 1 G/100 ML
1 INTRAVENOUS SOLUTION, PIGGYBACK (ML) INTRAVENOUS
Status: DISCONTINUED | OUTPATIENT
Start: 2020-12-15 | End: 2020-12-16 | Stop reason: HOSPADM

## 2020-12-15 RX ORDER — FLUTICASONE PROPIONATE 50 MCG
1 SPRAY, SUSPENSION (ML) NASAL DAILY
Status: DISCONTINUED | OUTPATIENT
Start: 2020-12-16 | End: 2020-12-16 | Stop reason: HOSPADM

## 2020-12-15 RX ORDER — ROSUVASTATIN CALCIUM 10 MG/1
10 TABLET, COATED ORAL NIGHTLY
Status: DISCONTINUED | OUTPATIENT
Start: 2020-12-15 | End: 2020-12-16 | Stop reason: HOSPADM

## 2020-12-15 RX ORDER — MAGNESIUM SULFATE HEPTAHYDRATE 40 MG/ML
4 INJECTION, SOLUTION INTRAVENOUS
Status: DISCONTINUED | OUTPATIENT
Start: 2020-12-15 | End: 2020-12-16 | Stop reason: HOSPADM

## 2020-12-15 RX ORDER — DILTIAZEM HYDROCHLORIDE 5 MG/ML
10 INJECTION INTRAVENOUS
Status: COMPLETED | OUTPATIENT
Start: 2020-12-15 | End: 2020-12-15

## 2020-12-15 RX ORDER — TALC
6 POWDER (GRAM) TOPICAL NIGHTLY PRN
Status: DISCONTINUED | OUTPATIENT
Start: 2020-12-15 | End: 2020-12-16 | Stop reason: HOSPADM

## 2020-12-15 RX ORDER — NAPROXEN SODIUM 220 MG/1
81 TABLET, FILM COATED ORAL DAILY
Status: DISCONTINUED | OUTPATIENT
Start: 2020-12-15 | End: 2020-12-16 | Stop reason: HOSPADM

## 2020-12-15 RX ORDER — POTASSIUM CHLORIDE 7.45 MG/ML
40 INJECTION INTRAVENOUS
Status: DISCONTINUED | OUTPATIENT
Start: 2020-12-15 | End: 2020-12-16 | Stop reason: HOSPADM

## 2020-12-15 RX ORDER — PANTOPRAZOLE SODIUM 40 MG/1
40 TABLET, DELAYED RELEASE ORAL DAILY
Status: DISCONTINUED | OUTPATIENT
Start: 2020-12-16 | End: 2020-12-16 | Stop reason: HOSPADM

## 2020-12-15 RX ORDER — LANOLIN ALCOHOL/MO/W.PET/CERES
800 CREAM (GRAM) TOPICAL
Status: DISCONTINUED | OUTPATIENT
Start: 2020-12-15 | End: 2020-12-16 | Stop reason: HOSPADM

## 2020-12-15 RX ORDER — POTASSIUM CHLORIDE 7.45 MG/ML
20 INJECTION INTRAVENOUS
Status: DISCONTINUED | OUTPATIENT
Start: 2020-12-15 | End: 2020-12-16 | Stop reason: HOSPADM

## 2020-12-15 RX ORDER — SODIUM CHLORIDE 0.9 % (FLUSH) 0.9 %
10 SYRINGE (ML) INJECTION
Status: DISCONTINUED | OUTPATIENT
Start: 2020-12-15 | End: 2020-12-16 | Stop reason: HOSPADM

## 2020-12-15 RX ADMIN — ROSUVASTATIN CALCIUM 10 MG: 10 TABLET, FILM COATED ORAL at 08:12

## 2020-12-15 RX ADMIN — DILTIAZEM HYDROCHLORIDE 10 MG: 5 INJECTION INTRAVENOUS at 07:12

## 2020-12-15 RX ADMIN — SODIUM CHLORIDE 1000 ML: 9 INJECTION, SOLUTION INTRAVENOUS at 07:12

## 2020-12-15 RX ADMIN — ENOXAPARIN SODIUM 70 MG: 80 INJECTION, SOLUTION INTRAVENOUS; SUBCUTANEOUS at 05:12

## 2020-12-15 NOTE — ED PROVIDER NOTES
Encounter Date: 12/15/2020       History     Chief Complaint   Patient presents with    Palpitations     60-year-old male who has a history of GERD, hypertension, hyperlipidemia, presents emergency room with a history that approximately 0545 hours this morning had a sudden onset of rapid heartbeat.  Patient states he has had a similar problem in the past but has never been able to actually catch the abnormal rhythm prior to a resolving and coming to the emergency room.  With this no complaints of any chest pain or shortness of breath.  No nausea vomiting.  No fever.  He denies any caffeine excess.  No history of any thyroid disease.        Review of patient's allergies indicates:  No Known Allergies  Past Medical History:   Diagnosis Date    Acid reflux     Hyperlipidemia     Hypertension     Perennial allergic rhinitis with seasonal variation      No past surgical history on file.  Family History   Problem Relation Age of Onset    Parkinsonism Mother     Hypertension Mother     Ulcers Father     Arthritis Father     Diabetes Father     Hypertension Brother     Cancer Brother     Diabetes Brother     Anesthesia problems Neg Hx     Clotting disorder Neg Hx      Social History     Tobacco Use    Smoking status: Never Smoker    Smokeless tobacco: Never Used   Substance Use Topics    Alcohol use: Yes     Frequency: 2-4 times a month     Drinks per session: 3 or 4     Binge frequency: Less than monthly     Comment: social    Drug use: No     Review of Systems   Constitutional: Negative for chills, diaphoresis and fever.   HENT: Negative for congestion, ear pain, rhinorrhea, sinus pain, sore throat and trouble swallowing.    Eyes: Negative for pain.   Respiratory: Negative for cough, chest tightness and shortness of breath.    Cardiovascular: Positive for palpitations. Negative for chest pain.   Gastrointestinal: Negative for abdominal pain, constipation, diarrhea, nausea and vomiting.   Genitourinary:  Negative for difficulty urinating.   Musculoskeletal: Negative for back pain, myalgias and neck pain.   Skin: Negative.  Negative for pallor, rash and wound.   Neurological: Negative for dizziness, tremors, syncope, weakness and headaches.   All other systems reviewed and are negative.      Physical Exam     Initial Vitals [12/15/20 0715]   BP Pulse Resp Temp SpO2   (!) 163/109 (!) 138 18 -- 99 %      MAP       --         Physical Exam    Vitals reviewed.  Constitutional: He appears well-developed and well-nourished. He is not diaphoretic. No distress.   HENT:   Head: Normocephalic and atraumatic.   Right Ear: External ear normal.   Left Ear: External ear normal.   Nose: Nose normal.   Mouth/Throat: Oropharynx is clear and moist.   Eyes: Conjunctivae and EOM are normal. Pupils are equal, round, and reactive to light.   Neck: Normal range of motion. Neck supple. No JVD present.   Cardiovascular: Regular rhythm, normal heart sounds and intact distal pulses. Exam reveals no gallop and no friction rub.    No murmur heard.  Pulmonary/Chest: Breath sounds normal. No respiratory distress. He has no wheezes. He has no rhonchi. He has no rales.   Abdominal: Soft. Bowel sounds are normal. He exhibits no distension. There is no abdominal tenderness. There is no rebound and no guarding.   Musculoskeletal: Normal range of motion. No tenderness or edema.   Lymphadenopathy:     He has no cervical adenopathy.   Neurological: He is alert and oriented to person, place, and time. He has normal strength. GCS score is 15. GCS eye subscore is 4. GCS verbal subscore is 5. GCS motor subscore is 6.   Skin: Skin is warm and dry. Capillary refill takes less than 2 seconds. No rash noted. No erythema. No pallor.   Psychiatric: He has a normal mood and affect. His behavior is normal. Judgment and thought content normal.         ED Course   Critical Care    Date/Time: 12/15/2020 11:08 AM  Performed by: Andres Morse Jr., MD  Authorized by:  Andres Morse Jr., MD   Direct patient critical care time: 20 minutes  Ordering / reviewing critical care time: 5 minutes  Documentation critical care time: 5 minutes  Consulting other physicians critical care time: 5 minutes  Total critical care time (exclusive of procedural time) : 35 minutes        Labs Reviewed   CBC W/ AUTO DIFFERENTIAL - Abnormal; Notable for the following components:       Result Value    MCH 34.3 (*)     All other components within normal limits   COMPREHENSIVE METABOLIC PANEL - Abnormal; Notable for the following components:    CO2 21 (*)     Glucose 126 (*)     BUN 22 (*)     Total Bilirubin 1.1 (*)     Alkaline Phosphatase 50 (*)     ALT 68 (*)     All other components within normal limits   URINALYSIS, REFLEX TO URINE CULTURE - Abnormal; Notable for the following components:    Color, UA Colorless (*)     All other components within normal limits    Narrative:     Specimen Source->Urine   D DIMER, QUANTITATIVE   MAGNESIUM   TROPONIN I   TSH   SARS-COV-2 RNA AMPLIFICATION, QUAL   DRUG SCREEN PANEL, URINE EMERGENCY        ECG Results          EKG 12-lead (In process)  Result time 12/15/20 09:53:51    In process by Interface, Lab In [facility] (12/15/20 09:53:51)                 Narrative:    Test Reason : I47.1,    Vent. Rate : 144 BPM     Atrial Rate : 144 BPM     P-R Int : 184 ms          QRS Dur : 124 ms      QT Int : 274 ms       P-R-T Axes : 236 014 002 degrees     QTc Int : 424 ms    Unusual P axis, possible ectopic atrial tachycardia  Nonspecific intraventricular conduction delay  Nonspecific ST abnormality  Abnormal ECG  No previous ECGs available    Referred By: [email or web address]   SELF           Confirmed By:                   In process by Interface, Lab In [facility] (12/15/20 09:40:20)                 Narrative:    Test Reason : I47.1,    Vent. Rate : 144 BPM     Atrial Rate : 144 BPM     P-R Int : 184 ms          QRS Dur : 124 ms      QT Int : 274 ms       P-R-T Axes : 236 014 002  degrees     QTc Int : 424 ms    Unusual P axis, possible ectopic atrial tachycardia  Nonspecific intraventricular conduction delay  Nonspecific ST abnormality  Abnormal ECG  No previous ECGs available    Referred By: [email or web address]   SELF           Confirmed By:                             Imaging Results          X-Ray Chest AP Portable (Final result)  Result time 12/15/20 09:40:57    Final result by Arianna Ventura MD (12/15/20 09:40:57)                 Narrative:    Portable chest x-ray at 9:38 AM is compared to prior study dated  7/14/2020    Clinical history is atrial flutter X    The lungs are clear. The cardiomediastinal silhouette is normal in  size. There are no acute osseous abnormalities.    IMPRESSION: No acute pulmonary process    Electronically Signed by Arianna Ventura M.D. on 12/15/2020 9:45 AM                                            Attending Attestation:             Attending ED Notes:   This 60-year-old male presented with SVT which is initially was thought to have been simply PSVT however briefly did he slow up in his showed evidence of atrial flutter.  The patient was afterwards given Cardizem 10 mg bolus times to with eventual conversion and noted to have had a sinus bradycardia in the upper 40s.  The patient states that he has had is normal pulse rate in a similar range.  Labs at this time showed a normal D-dimer, magnesium, troponin, TSH.  Chest x-ray is negative.  Urinalysis is normal.  Chemistries only remarkable for blood sugar 126 and BUN 22.  During the ED course the patient was not given further Cardizem drip.  Hospital Medicine was consulted Dr. Buckley will be accepted the patient for admission.                    Clinical Impression:     ICD-10-CM ICD-9-CM   1. SVT (supraventricular tachycardia)  I47.1 427.89   2. Atrial flutter  I48.92 427.32                          ED Disposition Condition    Observation                             Andres Morse Jr., MD  12/15/20 [id number]

## 2020-12-15 NOTE — H&P
Formerly [facility]  History & Physical    Patient Name: Patrice Smith  MRN: [id number]  Admission Date: 12/15/2020  Attending Physician: Dominique Buckley DO   Primary Care Provider: Ant Gallo MD         Patient information was obtained from patient, past medical records and ER records.     Subjective:     Principal Problem:Atrial flutter    Chief Complaint:   Chief Complaint   Patient presents with    Palpitations        HPI: 60-year-old male with past medical history of GERD, hypertension, hyperlipidemia presented to ED complaining of palpitations and rapid heartbeat that started at around 5:45 a.m. this a.m. He denies chest pain, chest pressure or tightness, shortness of breath, diaphoresis, anxiety, nausea, vomiting.  He states this is been a recurrent problem for several months.  He has been seen by Cardiology, Dr. mendenhall.  States that in July he had and echo and stress testing which were unremarkable.  Additionally he states he wore a Holter monitor for 30 days with no events noted.  He states that he has had recently high levels of stress.  States that he drank 12 oz coffees and 2 glasses of iced tea over last 24 hours.  He states he took a half pill of Cialis on Friday night.  Denies illicit drugs or alcohol.  States he takes a multivitamin and iron pill but no other supplements.  Spoke with the ED physician Dr. Morse who states that in the ED patient had SVT but as heart rate showed there was evidence of atrial flutter on telemetry.  Patient received Cardizem 10 mg x2.  Heart rate improved to high 40s mid 50s which patient states is his baseline. Ed recommending admission for new onset a flutter.  Lab work including D-dimer, Mg, trop, TSH, UA, urine drug screen all unremarkable.      Past Medical History:   Diagnosis Date    Acid reflux     Hyperlipidemia     Hypertension     Perennial allergic rhinitis with seasonal variation        No past surgical history on  file.    Review of patient's allergies indicates:  No Known Allergies    Current Facility-Administered Medications on File Prior to Encounter   Medication    methylPREDNISolone sod suc(PF) injection 40 mg     Current Outpatient Medications on File Prior to Encounter   Medication Sig    aspirin 81 MG Chew Take 1 tablet (81 mg total) by mouth once daily.    fluticasone propionate (FLONASE) 50 mcg/actuation nasal spray USE 1 SPRAY IN EACH NOSTRIL TWICE A DAY AS NEEDED (Patient taking differently: 1 spray by Each Nostril route 2 (two) times daily as needed. )    irbesartan (AVAPRO) 150 MG tablet Take 1 tablet (150 mg total) by mouth once daily.    multivitamin capsule Take 1 capsule by mouth once daily.      pantoprazole (PROTONIX) 40 MG tablet TAKE 1 TABLET DAILY (Patient taking differently: Take 40 mg by mouth once daily. )    rosuvastatin (CRESTOR) 10 MG tablet TAKE 1 TABLET DAILY (Patient taking differently: Take 10 mg by mouth once daily. )    tadalafil (CIALIS) 20 MG Tab Take 1 tablet (20 mg total) by mouth twice a week. As needed    vitamin A-vit C-vit E-zinc-Cu Tab Take by mouth once daily.      Family History     Problem Relation (Age of Onset)    Arthritis Father    Cancer Brother    Diabetes Father, Brother    Hypertension Mother, Brother    Parkinsonism Mother    Ulcers Father        Tobacco Use    Smoking status: Never Smoker    Smokeless tobacco: Never Used   Substance and Sexual Activity    Alcohol use: Yes     Frequency: 2-4 times a month     Drinks per session: 3 or 4     Binge frequency: Less than monthly     Comment: social    Drug use: No    Sexual activity: Not on file     Review of Systems   Constitutional: Negative for activity change, chills, fatigue and fever.   HENT: Negative for congestion, rhinorrhea and sore throat.    Eyes: Negative for visual disturbance.   Respiratory: Negative for cough, chest tightness and shortness of breath.    Cardiovascular: Positive for palpitations.  Negative for chest pain and leg swelling.   Gastrointestinal: Negative for abdominal pain, constipation, diarrhea, nausea and vomiting.   Genitourinary: Negative for dysuria and hematuria.   Musculoskeletal: Negative for arthralgias and myalgias.   Skin: Negative for rash.   Neurological: Negative for dizziness, light-headedness and headaches.   Psychiatric/Behavioral: Negative for agitation. The patient is not nervous/anxious.      Objective:     Vital Signs (Most Recent):  Pulse: (!) 46 (12/15/20 1320)  Resp: 15 (12/15/20 1320)  BP: (!) 141/67 (12/15/20 1320)  SpO2: 98 % (12/15/20 1320) Vital Signs (24h Range):  Pulse:  [] 46  Resp:  [9-29] 15  SpO2:  [96 %-100 %] 98 %  BP: (117-163)/() 141/67     Weight: 74.8 kg (165 lb)  Body mass index is 28.32 kg/m².    Physical Exam  Vitals signs and nursing note reviewed.   Constitutional:       General: He is not in acute distress.     Appearance: He is well-developed. He is not diaphoretic.   HENT:      Head: Normocephalic and atraumatic.      Nose: Nose normal.      Mouth/Throat:      Mouth: Mucous membranes are moist.   Eyes:      Conjunctiva/sclera: Conjunctivae normal.      Pupils: Pupils are equal, round, and reactive to light.   Neck:      Musculoskeletal: Normal range of motion and neck supple.   Cardiovascular:      Rate and Rhythm: Regular rhythm. Bradycardia present.      Heart sounds: Normal heart sounds.   Pulmonary:      Effort: Pulmonary effort is normal.      Breath sounds: Normal breath sounds.   Abdominal:      General: Bowel sounds are normal. There is no distension.      Palpations: Abdomen is soft.      Tenderness: There is no abdominal tenderness.   Musculoskeletal: Normal range of motion.         General: No tenderness or deformity.   Skin:     General: Skin is warm and dry.   Neurological:      General: No focal deficit present.      Mental Status: He is alert and oriented to person, place, and time.      Deep Tendon Reflexes: Reflexes  are normal and symmetric.   Psychiatric:         Mood and Affect: Mood normal.         Behavior: Behavior normal.         Thought Content: Thought content normal.          Significant Labs:   CBC:   Recent Labs   Lab 12/15/20  0734   WBC 5.14   HGB 17.4   HCT 49.3        CMP:   Recent Labs   Lab 12/15/20  0734      K 4.0      CO2 21*   *   BUN 22*   CREATININE 1.0   CALCIUM 9.6   PROT 7.5   ALBUMIN 5.0   BILITOT 1.1*   ALKPHOS 50*   AST 39   ALT 68*   ANIONGAP 12   EGFRNONAA >60.0     Cardiac Markers: No results for input(s): CKMB, MYOGLOBIN, BNP, TROPISTAT in the last 48 hours.  Troponin:   Recent Labs   Lab 12/15/20  0734   TROPONINI <0.030     TSH:   Recent Labs   Lab 12/15/20  0734   TSH 4.610     Urine Studies:   Recent Labs   Lab 12/15/20  0850   COLORU Colorless*   APPEARANCEUA Clear   PHUR 8.0   SPECGRAV 1.005   PROTEINUA Negative   GLUCUA Negative   KETONESU Negative   BILIRUBINUA Negative   OCCULTUA Negative   NITRITE Negative   UROBILINOGEN Negative   LEUKOCYTESUR Negative     All pertinent labs within the past 24 hours have been reviewed.    Significant Imaging: I have reviewed all pertinent imaging results/findings within the past 24 hours.   Results for orders placed or performed during the hospital encounter of 12/15/20   Repeat EKG 12-lead    Narrative    Test Reason : I48.92,    Vent. Rate : 078 BPM     Atrial Rate : 286 BPM     P-R Int : 000 ms          QRS Dur : 100 ms      QT Int : 398 ms       P-R-T Axes : 231 042 039 degrees     QTc Int : 453 ms    Atrial flutter with variable A-V block  Nonspecific ST abnormality  Abnormal ECG  When compared with ECG of 15-DEC-2020 07:22,  Atrial flutter has replaced Ectopic atrial rhythm  Vent. rate has decreased BY  66 BPM  QRS duration has decreased  ST no longer depressed in Inferior leads  ST no longer depressed in Lateral leads  Nonspecific T wave abnormality, improved in Inferior leads    Referred By: [email or web address]   SELF            Confirmed By:      Imaging Results          X-Ray Chest AP Portable (Final result)  Result time 12/15/20 09:40:57    Final result by Arianna Ventura MD (12/15/20 09:40:57)                 Narrative:    Portable chest x-ray at 9:38 AM is compared to prior study dated  7/14/2020    Clinical history is atrial flutter X    The lungs are clear. The cardiomediastinal silhouette is normal in  size. There are no acute osseous abnormalities.    IMPRESSION: No acute pulmonary process    Electronically Signed by Arianna Ventura M.D. 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 Corcoran)        Resolved Hospital Problems   No resolved problems to display.       PLAN:  Place in observation  Telemetry monitoring  lovenox 1cc/kg bid  Echo ordered  Trop x 1 negative  trend  Cardiology consulted  Electrolytes per sliding scale to maintain K > 4 and Mg > 2  Resume home meds for chronic conditions    VTE Risk Mitigation (From admission, onward)         Ordered     enoxaparin injection 70 mg  Every 12 hours (non-standard times)      12/15/20 1616     IP VTE HIGH RISK PATIENT  Once      12/15/20 1616     Place sequential compression device  Until discontinued      12/15/20 1616                  Dominique Buckley DO  Department of Hospital Medicine   Formerly [facility]

## 2020-12-15 NOTE — SUBJECTIVE & OBJECTIVE
Past Medical History:   Diagnosis Date    Acid reflux     Hyperlipidemia     Hypertension     Perennial allergic rhinitis with seasonal variation        No past surgical history on file.    Review of patient's allergies indicates:  No Known Allergies    Current Facility-Administered Medications on File Prior to Encounter   Medication    methylPREDNISolone sod suc(PF) injection 40 mg     Current Outpatient Medications on File Prior to Encounter   Medication Sig    aspirin 81 MG Chew Take 1 tablet (81 mg total) by mouth once daily.    fluticasone propionate (FLONASE) 50 mcg/actuation nasal spray USE 1 SPRAY IN EACH NOSTRIL TWICE A DAY AS NEEDED (Patient taking differently: 1 spray by Each Nostril route 2 (two) times daily as needed. )    irbesartan (AVAPRO) 150 MG tablet Take 1 tablet (150 mg total) by mouth once daily.    multivitamin capsule Take 1 capsule by mouth once daily.      pantoprazole (PROTONIX) 40 MG tablet TAKE 1 TABLET DAILY (Patient taking differently: Take 40 mg by mouth once daily. )    rosuvastatin (CRESTOR) 10 MG tablet TAKE 1 TABLET DAILY (Patient taking differently: Take 10 mg by mouth once daily. )    tadalafil (CIALIS) 20 MG Tab Take 1 tablet (20 mg total) by mouth twice a week. As needed    vitamin A-vit C-vit E-zinc-Cu Tab Take by mouth once daily.      Family History     Problem Relation (Age of Onset)    Arthritis Father    Cancer Brother    Diabetes Father, Brother    Hypertension Mother, Brother    Parkinsonism Mother    Ulcers Father        Tobacco Use    Smoking status: Never Smoker    Smokeless tobacco: Never Used   Substance and Sexual Activity    Alcohol use: Yes     Frequency: 2-4 times a month     Drinks per session: 3 or 4     Binge frequency: Less than monthly     Comment: social    Drug use: No    Sexual activity: Not on file     Review of Systems   Constitutional: Negative for activity change, chills, fatigue and fever.   HENT: Negative for congestion,  rhinorrhea and sore throat.    Eyes: Negative for visual disturbance.   Respiratory: Negative for cough, chest tightness and shortness of breath.    Cardiovascular: Positive for palpitations. Negative for chest pain and leg swelling.   Gastrointestinal: Negative for abdominal pain, constipation, diarrhea, nausea and vomiting.   Genitourinary: Negative for dysuria and hematuria.   Musculoskeletal: Negative for arthralgias and myalgias.   Skin: Negative for rash.   Neurological: Negative for dizziness, light-headedness and headaches.   Psychiatric/Behavioral: Negative for agitation. The patient is not nervous/anxious.      Objective:     Vital Signs (Most Recent):  Pulse: (!) 46 (12/15/20 1320)  Resp: 15 (12/15/20 1320)  BP: (!) 141/67 (12/15/20 1320)  SpO2: 98 % (12/15/20 1320) Vital Signs (24h Range):  Pulse:  [] 46  Resp:  [9-29] 15  SpO2:  [96 %-100 %] 98 %  BP: (117-163)/() 141/67     Weight: 74.8 kg (165 lb)  Body mass index is 28.32 kg/m².    Physical Exam  Vitals signs and nursing note reviewed.   Constitutional:       General: He is not in acute distress.     Appearance: He is well-developed. He is not diaphoretic.   HENT:      Head: Normocephalic and atraumatic.      Nose: Nose normal.      Mouth/Throat:      Mouth: Mucous membranes are moist.   Eyes:      Conjunctiva/sclera: Conjunctivae normal.      Pupils: Pupils are equal, round, and reactive to light.   Neck:      Musculoskeletal: Normal range of motion and neck supple.   Cardiovascular:      Rate and Rhythm: Regular rhythm. Bradycardia present.      Heart sounds: Normal heart sounds.   Pulmonary:      Effort: Pulmonary effort is normal.      Breath sounds: Normal breath sounds.   Abdominal:      General: Bowel sounds are normal. There is no distension.      Palpations: Abdomen is soft.      Tenderness: There is no abdominal tenderness.   Musculoskeletal: Normal range of motion.         General: No tenderness or deformity.   Skin:      General: Skin is warm and dry.   Neurological:      General: No focal deficit present.      Mental Status: He is alert and oriented to person, place, and time.      Deep Tendon Reflexes: Reflexes are normal and symmetric.   Psychiatric:         Mood and Affect: Mood normal.         Behavior: Behavior normal.         Thought Content: Thought content normal.          Significant Labs:   CBC:   Recent Labs   Lab 12/15/20  0734   WBC 5.14   HGB 17.4   HCT 49.3        CMP:   Recent Labs   Lab 12/15/20  0734      K 4.0      CO2 21*   *   BUN 22*   CREATININE 1.0   CALCIUM 9.6   PROT 7.5   ALBUMIN 5.0   BILITOT 1.1*   ALKPHOS 50*   AST 39   ALT 68*   ANIONGAP 12   EGFRNONAA >60.0     Cardiac Markers: No results for input(s): CKMB, MYOGLOBIN, BNP, TROPISTAT in the last 48 hours.  Troponin:   Recent Labs   Lab 12/15/20  0734   TROPONINI <0.030     TSH:   Recent Labs   Lab 12/15/20  0734   TSH 4.610     Urine Studies:   Recent Labs   Lab 12/15/20  0850   COLORU Colorless*   APPEARANCEUA Clear   PHUR 8.0   SPECGRAV 1.005   PROTEINUA Negative   GLUCUA Negative   KETONESU Negative   BILIRUBINUA Negative   OCCULTUA Negative   NITRITE Negative   UROBILINOGEN Negative   LEUKOCYTESUR Negative     All pertinent labs within the past 24 hours have been reviewed.    Significant Imaging: I have reviewed all pertinent imaging results/findings within the past 24 hours.   Results for orders placed or performed during the hospital encounter of 12/15/20   Repeat EKG 12-lead    Narrative    Test Reason : I48.92,    Vent. Rate : 078 BPM     Atrial Rate : 286 BPM     P-R Int : 000 ms          QRS Dur : 100 ms      QT Int : 398 ms       P-R-T Axes : 231 042 039 degrees     QTc Int : 453 ms    Atrial flutter with variable A-V block  Nonspecific ST abnormality  Abnormal ECG  When compared with ECG of 15-DEC-2020 07:22,  Atrial flutter has replaced Ectopic atrial rhythm  Vent. rate has decreased BY  66 BPM  QRS duration has  decreased  ST no longer depressed in Inferior leads  ST no longer depressed in Lateral leads  Nonspecific T wave abnormality, improved in Inferior leads    Referred By: [email or web address]   SELF           Confirmed By:      Imaging Results          X-Ray Chest AP Portable (Final result)  Result time 12/15/20 09:40:57    Final result by Arianna Ventura MD (12/15/20 09:40:57)                 Narrative:    Portable chest x-ray at 9:38 AM is compared to prior study dated  7/14/2020    Clinical history is atrial flutter X    The lungs are clear. The cardiomediastinal silhouette is normal in  size. There are no acute osseous abnormalities.    IMPRESSION: No acute pulmonary process    Electronically Signed by Arianna Ventura M.D. on 12/15/2020 9:45 AM

## 2020-12-15 NOTE — HPI
60-year-old male with past medical history of GERD, hypertension, hyperlipidemia presented to ED complaining of palpitations and rapid heartbeat that started at around 5:45 a.m. this a.m. He denies chest pain, chest pressure or tightness, shortness of breath, diaphoresis, anxiety, nausea, vomiting.  He states this is been a recurrent problem for several months.  He has been seen by Cardiology, Dr. mendenhall.  States that in July he had and echo and stress testing which were unremarkable.  Additionally he states he wore a Holter monitor for 30 days with no events noted.  He states that he has had recently high levels of stress.  States that he drank 12 oz coffees and 2 glasses of iced tea over last 24 hours.  He states he took a half pill of Cialis on Friday night.  Denies illicit drugs or alcohol.  States he takes a multivitamin and iron pill but no other supplements.  Spoke with the ED physician Dr. Morse who states that in the ED patient had SVT but as heart rate showed there was evidence of atrial flutter on telemetry.  Patient received Cardizem 10 mg x2.  Heart rate improved to high 40s mid 50s which patient states is his baseline. Ed recommending admission for new onset a flutter.  Lab work including D-dimer, Mg, trop, TSH, UA, urine drug screen all unremarkable.

## 2020-12-16 ENCOUNTER — HOSPITAL ENCOUNTER (OUTPATIENT)
Dept: RADIOLOGY | Facility: HOSPITAL | Age: 60
Discharge: HOME | End: 2020-12-16
Attending: SPECIALIST
Payer: OTHER GOVERNMENT

## 2020-12-16 ENCOUNTER — CLINICAL SUPPORT (OUTPATIENT)
Dept: CARDIOLOGY | Facility: HOSPITAL | Age: 60
End: 2020-12-16
Attending: SPECIALIST
Payer: OTHER GOVERNMENT

## 2020-12-16 VITALS
WEIGHT: 169.31 LBS | BODY MASS INDEX: 28.91 KG/M2 | TEMPERATURE: 98 F | HEIGHT: 64 IN | DIASTOLIC BLOOD PRESSURE: 71 MMHG | HEART RATE: 58 BPM | SYSTOLIC BLOOD PRESSURE: 135 MMHG | RESPIRATION RATE: 20 BRPM | OXYGEN SATURATION: 97 %

## 2020-12-16 LAB
ALBUMIN SERPL BCP-MCNC: 4.2 G/DL (ref 3.5–5.2)
ALP SERPL-CCNC: 44 U/L (ref 55–135)
ALT SERPL W/O P-5'-P-CCNC: 64 U/L (ref 10–44)
ANION GAP SERPL CALC-SCNC: 9 MMOL/L (ref 8–16)
AST SERPL-CCNC: 34 U/L (ref 10–40)
BASOPHILS # BLD AUTO: 0.03 K/UL (ref 0–0.2)
BASOPHILS NFR BLD: 0.5 % (ref 0–1.9)
BILIRUB SERPL-MCNC: 1.3 MG/DL (ref 0.1–1)
BUN SERPL-MCNC: 21 MG/DL (ref 6–20)
CALCIUM SERPL-MCNC: 9.1 MG/DL (ref 8.7–10.5)
CHLORIDE SERPL-SCNC: 109 MMOL/L (ref 95–110)
CHOLEST SERPL-MCNC: 124 MG/DL (ref 120–199)
CHOLEST/HDLC SERPL: 2.3 {RATIO} (ref 2–5)
CO2 SERPL-SCNC: 23 MMOL/L (ref 23–29)
CREAT SERPL-MCNC: 1 MG/DL (ref 0.5–1.4)
CV STRESS BASE HR: 58 BPM
DIASTOLIC BLOOD PRESSURE: 91 MMHG
DIFFERENTIAL METHOD: ABNORMAL
EOSINOPHIL # BLD AUTO: 0.1 K/UL (ref 0–0.5)
EOSINOPHIL NFR BLD: 1.2 % (ref 0–8)
ERYTHROCYTE [DISTWIDTH] IN BLOOD BY AUTOMATED COUNT: 12.3 % (ref 11.5–14.5)
EST. GFR  (AFRICAN AMERICAN): >60 ML/MIN/1.73 M^2
EST. GFR  (NON AFRICAN AMERICAN): >60 ML/MIN/1.73 M^2
ESTIMATED AVG GLUCOSE: 120 MG/DL (ref 68–131)
GLUCOSE SERPL-MCNC: 123 MG/DL (ref 70–110)
HBA1C MFR BLD HPLC: 5.8 % (ref 4.5–6.2)
HCT VFR BLD AUTO: 43.3 % (ref 40–54)
HDLC SERPL-MCNC: 54 MG/DL (ref 40–75)
HDLC SERPL: 43.5 % (ref 20–50)
HGB BLD-MCNC: 15.1 G/DL (ref 14–18)
IMM GRANULOCYTES # BLD AUTO: 0.02 K/UL (ref 0–0.04)
IMM GRANULOCYTES NFR BLD AUTO: 0.3 % (ref 0–0.5)
LDLC SERPL CALC-MCNC: 46 MG/DL (ref 63–159)
LYMPHOCYTES # BLD AUTO: 2.4 K/UL (ref 1–4.8)
LYMPHOCYTES NFR BLD: 40.4 % (ref 18–48)
MAGNESIUM SERPL-MCNC: 2.1 MG/DL (ref 1.6–2.6)
MCH RBC QN AUTO: 34.2 PG (ref 27–31)
MCHC RBC AUTO-ENTMCNC: 34.9 G/DL (ref 32–36)
MCV RBC AUTO: 98 FL (ref 82–98)
MONOCYTES # BLD AUTO: 0.4 K/UL (ref 0.3–1)
MONOCYTES NFR BLD: 6.8 % (ref 4–15)
NEUTROPHILS # BLD AUTO: 3.1 K/UL (ref 1.8–7.7)
NEUTROPHILS NFR BLD: 50.8 % (ref 38–73)
NONHDLC SERPL-MCNC: 70 MG/DL
NRBC BLD-RTO: 0 /100 WBC
OHS CV CPX 1 MINUTE RECOVERY HEART RATE: 132 BPM
OHS CV CPX 85 PERCENT MAX PREDICTED HEART RATE MALE: 136
OHS CV CPX ESTIMATED METS: 12.1
OHS CV CPX MAX PREDICTED HEART RATE: 160
OHS CV CPX PATIENT IS FEMALE: 0
OHS CV CPX PATIENT IS MALE: 1
OHS CV CPX PEAK DIASTOLIC BLOOD PRESSURE: 93 MMHG
OHS CV CPX PEAK HEAR RATE: 143 BPM
OHS CV CPX PEAK RATE PRESSURE PRODUCT: NORMAL
OHS CV CPX PEAK SYSTOLIC BLOOD PRESSURE: 205 MMHG
OHS CV CPX PERCENT MAX PREDICTED HEART RATE ACHIEVED: 89
OHS CV CPX RATE PRESSURE PRODUCT PRESENTING: 8294
PHOSPHATE SERPL-MCNC: 4.3 MG/DL (ref 2.7–4.5)
PLATELET # BLD AUTO: 196 K/UL (ref 150–350)
PMV BLD AUTO: 10.8 FL (ref 9.2–12.9)
POTASSIUM SERPL-SCNC: 4 MMOL/L (ref 3.5–5.1)
PROT SERPL-MCNC: 6.6 G/DL (ref 6–8.4)
RBC # BLD AUTO: 4.42 M/UL (ref 4.6–6.2)
SODIUM SERPL-SCNC: 141 MMOL/L (ref 136–145)
STRESS ECHO POST EXERCISE DUR MIN: 11 MINUTES
STRESS ECHO POST EXERCISE DUR SEC: 30 SECONDS
SYSTOLIC BLOOD PRESSURE: 143 MMHG
TRIGL SERPL-MCNC: 120 MG/DL (ref 30–150)
WBC # BLD AUTO: 6.01 K/UL (ref 3.9–12.7)

## 2020-12-16 PROCEDURE — 93018 CV STRESS TEST I&R ONLY: CPT | Mod: ,,, | Performed by: INTERNAL MEDICINE

## 2020-12-16 PROCEDURE — 80061 LIPID PANEL: CPT

## 2020-12-16 PROCEDURE — 93016 CV STRESS TEST SUPVJ ONLY: CPT | Mod: ,,, | Performed by: INTERNAL MEDICINE

## 2020-12-16 PROCEDURE — 36415 COLL VENOUS BLD VENIPUNCTURE: CPT

## 2020-12-16 PROCEDURE — 83735 ASSAY OF MAGNESIUM: CPT

## 2020-12-16 PROCEDURE — 93018 NUCLEAR STRESS TEST (CUPID ONLY): ICD-10-PCS | Mod: ,,, | Performed by: INTERNAL MEDICINE

## 2020-12-16 PROCEDURE — 25000003 PHARM REV CODE 250: Performed by: STUDENT IN AN ORGANIZED HEALTH CARE EDUCATION/TRAINING PROGRAM

## 2020-12-16 PROCEDURE — 93016 NUCLEAR STRESS TEST (CUPID ONLY): ICD-10-PCS | Mod: ,,, | Performed by: INTERNAL MEDICINE

## 2020-12-16 PROCEDURE — 85025 COMPLETE CBC W/AUTO DIFF WBC: CPT

## 2020-12-16 PROCEDURE — G0378 HOSPITAL OBSERVATION PER HR: HCPCS

## 2020-12-16 PROCEDURE — 84100 ASSAY OF PHOSPHORUS: CPT

## 2020-12-16 PROCEDURE — 80053 COMPREHEN METABOLIC PANEL: CPT

## 2020-12-16 PROCEDURE — 96372 THER/PROPH/DIAG INJ SC/IM: CPT | Mod: 59

## 2020-12-16 PROCEDURE — 93017 CV STRESS TEST TRACING ONLY: CPT

## 2020-12-16 PROCEDURE — 63600175 PHARM REV CODE 636 W HCPCS: Performed by: STUDENT IN AN ORGANIZED HEALTH CARE EDUCATION/TRAINING PROGRAM

## 2020-12-16 PROCEDURE — A9502 TC99M TETROFOSMIN: HCPCS

## 2020-12-16 RX ADMIN — ASPIRIN 81 MG: 81 TABLET, CHEWABLE ORAL at 11:12

## 2020-12-16 RX ADMIN — PANTOPRAZOLE SODIUM 40 MG: 40 TABLET, DELAYED RELEASE ORAL at 11:12

## 2020-12-16 RX ADMIN — ENOXAPARIN SODIUM 70 MG: 80 INJECTION, SOLUTION INTRAVENOUS; SUBCUTANEOUS at 06:12

## 2020-12-16 RX ADMIN — IRBESARTAN 150 MG: 150 TABLET ORAL at 11:12

## 2020-12-16 NOTE — NURSING
Discharge instructions reviewed with pt. Denies any questions. Copy of discharge instructions given to pt. Discharge home via ambulatory to vehicle. Accompanied by wife.

## 2020-12-16 NOTE — CARE UPDATE
12/15/20 2105   Patient Assessment/Suction   Level of Consciousness (AVPU) alert   Respiratory Effort Unlabored   Expansion/Accessory Muscles/Retractions no use of accessory muscles   All Lung Fields Breath Sounds clear;diminished   Rhythm/Pattern, Respiratory no shortness of breath reported   Cough Frequency no cough   PRE-TX-O2   O2 Device (Oxygen Therapy) room air   SpO2 97 %   Pulse Oximetry Type Intermittent   $ Pulse Oximetry - Multiple Charge Pulse Oximetry - Multiple   Pulse (!) 50   Resp 17   Positioning   Head of Bed (HOB) HOB elevated;HOB at 30 degrees   Respiratory Evaluation   $ Care Plan Tech Time 15 min

## 2020-12-16 NOTE — PROGRESS NOTES
Myocardial Perfusion Stress images in progress at 11:43  Myocardial Perfusion 1 Day Stress Test will be completed at 11:51  [facility]

## 2020-12-16 NOTE — PROGRESS NOTES
@ 09:53  PATIENT INJECTED WITH MYOVIEW IV FOR STRESS IMAGES.    RELEASE NPO STATUS FROM NUCLEAR MEDICINE.

## 2020-12-17 LAB
AORTIC ROOT ANNULUS: 3.07 CM
AORTIC VALVE CUSP SEPERATION: 2.37 CM
AV INDEX (PROSTH): 0.96
AV MEAN GRADIENT: 4 MMHG
AV PEAK GRADIENT: 8 MMHG
AV VALVE AREA: 3.29 CM2
AV VELOCITY RATIO: 75.1
BSA FOR ECHO PROCEDURE: 1.84 M2
CV ECHO LV RWT: 0.57 CM
DOP CALC AO PEAK VEL: 1.38 M/S
DOP CALC AO VTI: 25.35 CM
DOP CALC LVOT AREA: 3.4 CM2
DOP CALC LVOT DIAMETER: 2.09 CM
DOP CALC LVOT PEAK VEL: 103.64 M/S
DOP CALC LVOT STROKE VOLUME: 83.46 CM3
DOP CALCLVOT PEAK VEL VTI: 24.34 CM
E WAVE DECELERATION TIME: 235.9 MSEC
E/A RATIO: 1.25
E/E' RATIO: 7.83 M/S
ECHO LV POSTERIOR WALL: 1.16 CM (ref 0.6–1.1)
FRACTIONAL SHORTENING: 54 % (ref 28–44)
INTERVENTRICULAR SEPTUM: 1.07 CM (ref 0.6–1.1)
LEFT ATRIUM SIZE: 3.11 CM
LEFT INTERNAL DIMENSION IN SYSTOLE: 1.87 CM (ref 2.1–4)
LEFT VENTRICLE MASS INDEX: 85 G/M2
LEFT VENTRICULAR INTERNAL DIMENSION IN DIASTOLE: 4.07 CM (ref 3.5–6)
LEFT VENTRICULAR MASS: 152.55 G
LV LATERAL E/E' RATIO: 6.92 M/S
LV SEPTAL E/E' RATIO: 9 M/S
MV PEAK A VEL: 0.72 M/S
MV PEAK E VEL: 0.9 M/S
PISA TR MAX VEL: 2.32 M/S
PV PEAK VELOCITY: 110 CM/S
RA PRESSURE: 3 MMHG
RIGHT VENTRICULAR END-DIASTOLIC DIMENSION: 273 CM
TDI LATERAL: 0.13 M/S
TDI SEPTAL: 0.1 M/S
TDI: 0.12 M/S
TR MAX PG: 22 MMHG
TV REST PULMONARY ARTERY PRESSURE: 25 MMHG

## 2020-12-17 NOTE — PLAN OF CARE
12/17/20 1409   Final Note   Assessment Type Final Discharge Note   Anticipated Discharge Disposition Home     Discharge orders reviewed.  Patient discharged home with no needs.    Kerri Heredia LCSW  Case Management  Ext. [phone]

## 2020-12-18 ENCOUNTER — PATIENT MESSAGE (OUTPATIENT)
Dept: CARDIOLOGY | Facility: CLINIC | Age: 60
End: 2020-12-18

## 2020-12-21 ENCOUNTER — OFFICE VISIT (OUTPATIENT)
Dept: CARDIOLOGY | Facility: CLINIC | Age: 60
End: 2020-12-21
Payer: OTHER GOVERNMENT

## 2020-12-21 VITALS
DIASTOLIC BLOOD PRESSURE: 80 MMHG | HEART RATE: 50 BPM | SYSTOLIC BLOOD PRESSURE: 120 MMHG | OXYGEN SATURATION: 100 % | HEIGHT: 64 IN | BODY MASS INDEX: 29.19 KG/M2 | WEIGHT: 171 LBS

## 2020-12-21 DIAGNOSIS — G47.33 OSA (OBSTRUCTIVE SLEEP APNEA): ICD-10-CM

## 2020-12-21 DIAGNOSIS — I10 ESSENTIAL HYPERTENSION: Primary | ICD-10-CM

## 2020-12-21 DIAGNOSIS — I48.3 TYPICAL ATRIAL FLUTTER: ICD-10-CM

## 2020-12-21 PROCEDURE — 99214 PR OFFICE/OUTPT VISIT, EST, LEVL IV, 30-39 MIN: ICD-10-PCS | Mod: S$GLB,,, | Performed by: SPECIALIST

## 2020-12-21 PROCEDURE — 99214 OFFICE O/P EST MOD 30 MIN: CPT | Mod: S$GLB,,, | Performed by: SPECIALIST

## 2020-12-21 NOTE — PROGRESS NOTES
Subjective:    Patient ID:  Patrice Smith is a 60 y.o. male who presents for   Hospital Follow Up and Atrial Flutter    HPI   June 20 1st palopatation and       Last wek  A flutter - went away in   Er and broke; no   More  No  Cp   Review of patient's allergies indicates:  No Known Allergies    10 years hi  bp    Last est ok   Event monitor July sinus bradycardia       Er  Strips show atrial  Flutter    - cat has herman and wears machibner   Basically this past summer the patient had about of irregular heartbeat but we were not able to documented  The event monitor done back then showed sinus bradycardia but no atrial flutter  This current ER hospitalization showed definite atrial flutter that broke with IV Cardizem  He has had no more atrial flutter  He does have obstructive sleep apnea and is on CPAP  He has had high blood pressure that is been under control              Past Medical History:   Diagnosis Date    Acid reflux     Atrial flutter     Hyperlipidemia     Hypertension     Perennial allergic rhinitis with seasonal variation      History reviewed. No pertinent surgical history.  Social History     Tobacco Use    Smoking status: Never Smoker    Smokeless tobacco: Never Used   Substance Use Topics    Alcohol use: Yes     Frequency: 2-4 times a month     Drinks per session: 3 or 4     Binge frequency: Less than monthly     Comment: social    Drug use: No        Review of Systems     Review of Systems   Constitution: Positive for weight gain.   HENT: Negative.    Eyes: Negative for blurred vision.   Cardiovascular: Positive for irregular heartbeat. Negative for chest pain and dyspnea on exertion.   Respiratory: Negative.    Gastrointestinal: Negative.    Genitourinary: Negative.            Objective:        Vitals:    12/21/20 1537   BP: 120/80   Pulse: (!) 50       Lab Results   Component Value Date    WBC 6.01 12/16/2020    HGB 15.1 12/16/2020    HCT 43.3 12/16/2020     12/16/2020    CHOL 124  12/16/2020    TRIG 120 12/16/2020    HDL 54 12/16/2020    ALT 64 (H) 12/16/2020    AST 34 12/16/2020     12/16/2020    K 4.0 12/16/2020     12/16/2020    CREATININE 1.0 12/16/2020    BUN 21 (H) 12/16/2020    CO2 23 12/16/2020    TSH 4.610 12/15/2020    PSA 1.6 02/06/2014    HGBA1C 5.8 12/15/2020    MICROALBUR <3.0 04/02/2019        ECHOCARDIOGRAM RESULTS  Results for orders placed during the hospital encounter of 12/15/20   Echo Color Flow Doppler? Yes    Narrative · Normal central venous pressure (3 mmHg).  · The estimated PA systolic pressure is 25 mmHg.  · The left ventricle is normal in size with hyperdynamic systolic   function. There is left ventricular concentric remodeling. The estimated   ejection fraction is 80%.  · Normal left ventricular diastolic function.  · Normal right ventricular size with normal right ventricular systolic   function.          CURRENT/PREVIOUS VISIT EKG  Results for orders placed or performed during the hospital encounter of 12/15/20   EKG 12-lead    Collection Time: 12/15/20  4:52 PM    Narrative    Test Reason : I48.91,    Vent. Rate : 045 BPM     Atrial Rate : 045 BPM     P-R Int : 158 ms          QRS Dur : 102 ms      QT Int : 460 ms       P-R-T Axes : 047 025 027 degrees     QTc Int : 397 ms    Sinus bradycardia  Possible Left atrial enlargement  Borderline Abnormal ECG  When compared with ECG of 15-DEC-2020 12:16,  No significant change was found  Confirmed by Jude Max MD ([id number]) on 12/19/2020 7:58:03 PM    Referred By: [email or web address]   SELF           Confirmed By:Jude Max MD     Results for orders placed during the hospital encounter of 12/15/20   Echo Color Flow Doppler? Yes    Narrative · Normal central venous pressure (3 mmHg).  · The estimated PA systolic pressure is 25 mmHg.  · The left ventricle is normal in size with hyperdynamic systolic   function. There is left ventricular concentric remodeling. The estimated   ejection fraction is 80%.  ·  Normal left ventricular diastolic function.  · Normal right ventricular size with normal right ventricular systolic   function.        Results for orders placed during the hospital encounter of 12/15/20   Treadmill Stress Test    Narrative   The EKG portion of this study is negative for ischemia.    The patient reported no chest pain during the stress test.    There were no arrhythmias during stress.    ECG Stress Nuclear portion of this study will be reported separately.         PHYSICAL EXAM    Physical Exam pulse is 50  Neck no bruits  Lungs are clear  Cardiac sounds regular  Abdomen extremities are okay  Medication List with Changes/Refills   Current Medications    ASPIRIN 81 MG CHEW    Take 1 tablet (81 mg total) by mouth once daily.    FLUTICASONE PROPIONATE (FLONASE) 50 MCG/ACTUATION NASAL SPRAY    USE 1 SPRAY IN EACH NOSTRIL TWICE A DAY AS NEEDED    IRBESARTAN (AVAPRO) 150 MG TABLET    Take 1 tablet (150 mg total) by mouth once daily.    MULTIVITAMIN CAPSULE    Take 1 capsule by mouth once daily.      PANTOPRAZOLE (PROTONIX) 40 MG TABLET    TAKE 1 TABLET DAILY    ROSUVASTATIN (CRESTOR) 10 MG TABLET    TAKE 1 TABLET DAILY    TADALAFIL (CIALIS) 20 MG TAB    Take 1 tablet (20 mg total) by mouth twice a week. As needed    VITAMIN A-VIT C-VIT E-ZINC-CU TAB    Take by mouth once daily.            Assessment:     a flutter ; question of sick sinus syndrome with sinus bradycardia  No diagnosis found.   herman   Plan:   Watch  Avoid rate lo  antiarrthymics and bb and cardizemn   ? Ablation  Or pacer down line   Right now he is going to try to keep his weight down watch his blood pressure  He has chads Vasc 1 Rx low-dose aspirin  Problem List Items Addressed This Visit     None           No follow-ups on file.

## 2021-03-02 NOTE — DISCHARGE SUMMARY
[facility]  Discharge Summary      Patient Name: Patrice Smith  MRN: [id number]  Admission Date: 12/15/2020  Hospital Length of Stay: 0 days  Discharge Date and Time: 12/16/2020  5:43 PM  Attending Physician: Dominique Buckley DO   Discharging Provider: Dominique Buckley DO  Primary Care Provider: Ant Gallo MD      HPI:   60-year-old male with past medical history of GERD, hypertension, hyperlipidemia presented to ED complaining of palpitations and rapid heartbeat that started at around 5:45 a.m. this a.m. He denies chest pain, chest pressure or tightness, shortness of breath, diaphoresis, anxiety, nausea, vomiting.  He states this is been a recurrent problem for several months.  He has been seen by Cardiology, Dr. mendenhall.  States that in July he had and echo and stress testing which were unremarkable.  Additionally he states he wore a Holter monitor for 30 days with no events noted.  He states that he has had recently high levels of stress.  States that he drank 12 oz coffees and 2 glasses of iced tea over last 24 hours.  He states he took a half pill of Cialis on Friday night.  Denies illicit drugs or alcohol.  States he takes a multivitamin and iron pill but no other supplements.  Spoke with the ED physician Dr. Morse who states that in the ED patient had SVT but as heart rate showed there was evidence of atrial flutter on telemetry.  Patient received Cardizem 10 mg x2.  Heart rate improved to high 40s mid 50s which patient states is his baseline. Ed recommending admission for new onset a flutter.  Lab work including D-dimer, Mg, trop, TSH, UA, urine drug screen all unremarkable.      * No surgery found *      Hospital Course:   Patient admitted and monitored on telemetry with HR 40-50's (normal rate per patient). Trop negative x 3. Stress test negative. Echo performed but read pending. Patient requesting discharge. Patient was hemodynamically stable and cleared for dc with  "close outpt follow up with pcp. Patient advised to avoid caffeinated beverages.   Physical exam on day of discharge:     /71 (BP Location: Right arm, Patient Position: Lying)   Pulse (!) 58   Temp 98.4 °F (36.9 °C) (Oral)   Resp 20   Ht 5' 4" (1.626 m)   Wt 76.8 kg (169 lb 5 oz)   SpO2 97%   BMI 29.06 kg/m²     Gen: nad, sitting up in chair  CV: kyle regular rhythm  no mrg  Resp: CTA B/l  AB: +bs soft ntnd  Skin: dry, warm        Consults:   Consults (From admission, onward)        Status Ordering Provider     Inpatient consult to Cardiology  Once     Provider:  Nicanor Weston MD    Acknowledged [address]     Inpatient consult to Hospitalist  Once     Provider:  Mick Street DO    Acknowledged [address]          No new Assessment & Plan notes have been filed under this hospital service since the last note was generated.  Service: Hospital Medicine    Final Active Diagnoses:    Diagnosis Date Noted POA    PRINCIPAL PROBLEM:  Atrial flutter [I48.92] 12/15/2020 Yes    IGT (impaired glucose tolerance) [R73.02] 04/05/2019 Yes    Gastroesophageal reflux disease without esophagitis [K21.9] 06/27/2017 Yes    Hypertension [I10] 06/27/2017 Yes    Multiple-type hyperlipidemia [E78.2] 06/27/2017 Yes    Obstructive sleep apnea syndrome [G47.33] 06/27/2017 Yes      Problems Resolved During this Admission:       Discharged Condition: stable    Disposition: Home or Self Care    Follow Up:  Follow-up Information     Nicanor Weston MD.    Specialties: Cardiology, Cardiovascular Disease  Contact information:  [address]  [address]  [place] [address]  [phone]             Ant Gallo MD.    Specialty: Family Medicine  Why: As needed  Contact information:  [address]  [address]  [place] [address]  [phone]             [facility].    Specialty: Emergency Medicine  Why: As needed  Contact information:  [address] " [id number]  [phone]  Additional information:  1st floor               Patient Instructions:      Diet Cardiac     Notify your health care provider if you experience any of the following:  temperature >100.4     Notify your health care provider if you experience any of the following:  persistent nausea and vomiting or diarrhea     Notify your health care provider if you experience any of the following:  severe uncontrolled pain     Notify your health care provider if you experience any of the following:  difficulty breathing or increased cough     Notify your health care provider if you experience any of the following:  severe persistent headache     Notify your health care provider if you experience any of the following:  persistent dizziness, light-headedness, or visual disturbances     Notify your health care provider if you experience any of the following:  increased confusion or weakness     Activity as tolerated       Significant Diagnostic Studies: Labs:   Troponin   Recent Labs   Lab 12/15/20  0734 12/15/20  1702 12/15/20  2040   TROPONINI <0.030 <0.030 <0.030    and       Stress test only, exercise    The EKG portion of this study is negative for ischemia.    The patient reported no chest pain during the stress test.    There were no arrhythmias during stress.    ECG Stress Nuclear portion of this study will be reported separately.  NM Myocardial Perfusion Spect Multi Exer  IMPRESSION:    1. No scintigraphic evidence of reversible myocardial  ischemia.    2. Estimated ejection fraction is 72%%.    Echo Color Flow Doppler? Yes   Read pending        All labs within the past 24 hours have been reviewed    Pending Diagnostic Studies:     Procedure Component Value Units Date/Time    Echo Color Flow Doppler? Yes [[id number]]  (Abnormal) Resulted: 12/15/20 1623    Order Status: Sent Lab Status: In process Updated: 12/15/20 1623     BSA 1.84 m2      Right Atrial Pressure (from IVC) 3 mmHg      TDI SEPTAL  0.10 m/s      LV LATERAL E/E' RATIO 6.92 m/s      LV SEPTAL E/E' RATIO 9.00 m/s      AORTIC VALVE CUSP SEPERATION 2.37 cm      TDI LATERAL 0.13 m/s      PV PEAK VELOCITY 110.00 cm/s      LVIDd 4.07 cm      IVS 1.07 cm      Posterior Wall 1.16 cm      Ao root annulus 3.07 cm      LVIDs 1.87 cm      FS 54 %      LV mass 152.55 g      LA size 3.11 cm      RVDD 273.00 cm      Left Ventricle Relative Wall Thickness 0.57 cm      AV mean gradient 4 mmHg      AV valve area 3.29 cm2      AV Velocity Ratio 75.10     AV index (prosthetic) 0.96     E/A ratio 1.25     Mean e' 0.12 m/s      E wave decelartion time 235.90 msec      LVOT diameter 2.09 cm      LVOT area 3.4 cm2      LVOT peak jarrod 103.64 m/s      LVOT peak VTI 24.34 cm      Ao peak jarrod 1.38 m/s      Ao VTI 25.35 cm      LVOT stroke volume 83.46 cm3      AV peak gradient 8 mmHg      TV rest pulmonary artery pressure 25 mmHg      E/E' ratio 7.83 m/s      MV Peak E Jarrod 0.90 m/s      TR Max Jarrod 2.32 m/s      MV Peak A Jarrod 0.72 m/s      LV Mass Index 85 g/m2      Triscuspid Valve Regurgitation Peak Gradient 22 mmHg     Narrative:      · Normal central venous pressure (3 mmHg).  · The estimated PA systolic pressure is 25 mmHg.       Impression:               Medications:  Reconciled Home Medications:      Medication List      CHANGE how you take these medications    fluticasone propionate 50 mcg/actuation nasal spray  Commonly known as: FLONASE  USE 1 SPRAY IN EACH NOSTRIL TWICE A DAY AS NEEDED  What changed: See the new instructions.        CONTINUE taking these medications    aspirin 81 MG Chew  Take 1 tablet (81 mg total) by mouth once daily.     irbesartan 150 MG tablet  Commonly known as: AVAPRO  Take 1 tablet (150 mg total) by mouth once daily.     multivitamin capsule  Take 1 capsule by mouth once daily.     pantoprazole 40 MG tablet  Commonly known as: PROTONIX  TAKE 1 TABLET DAILY     rosuvastatin 10 MG tablet  Commonly known as: CRESTOR  TAKE 1 TABLET DAILY      tadalafiL 20 MG Tab  Commonly known as: CIALIS  Take 1 tablet (20 mg total) by mouth twice a week. As needed     vitamin A-vit C-vit E-zinc-Cu Tab  Take by mouth once daily.            Indwelling Lines/Drains at time of discharge:   Lines/Drains/Airways     None                 Time spent on the discharge of patient: 27 minutes  Patient was seen and examined on the date of discharge and determined to be suitable for discharge.         Dominique Buckley DO  Department of Hospital Medicine  [facility]   clear

## 2021-03-05 ENCOUNTER — IMMUNIZATION (OUTPATIENT)
Dept: FAMILY MEDICINE | Facility: CLINIC | Age: 61
End: 2021-03-05
Payer: OTHER GOVERNMENT

## 2021-03-05 DIAGNOSIS — Z23 NEED FOR VACCINATION: Primary | ICD-10-CM

## 2021-03-05 PROCEDURE — 0001A COVID-19, MRNA, LNP-S, PF, 30 MCG/0.3 ML DOSE VACCINE: ICD-10-PCS | Mod: CV19,,, | Performed by: FAMILY MEDICINE

## 2021-03-05 PROCEDURE — 0001A COVID-19, MRNA, LNP-S, PF, 30 MCG/0.3 ML DOSE VACCINE: CPT | Mod: CV19,,, | Performed by: FAMILY MEDICINE

## 2021-03-05 PROCEDURE — 91300 COVID-19, MRNA, LNP-S, PF, 30 MCG/0.3 ML DOSE VACCINE: CPT | Mod: ,,, | Performed by: FAMILY MEDICINE

## 2021-03-05 PROCEDURE — 91300 COVID-19, MRNA, LNP-S, PF, 30 MCG/0.3 ML DOSE VACCINE: ICD-10-PCS | Mod: ,,, | Performed by: FAMILY MEDICINE

## 2021-03-23 ENCOUNTER — PATIENT MESSAGE (OUTPATIENT)
Dept: CARDIOLOGY | Facility: CLINIC | Age: 61
End: 2021-03-23

## 2021-03-26 ENCOUNTER — IMMUNIZATION (OUTPATIENT)
Dept: FAMILY MEDICINE | Facility: CLINIC | Age: 61
End: 2021-03-26
Payer: OTHER GOVERNMENT

## 2021-03-26 DIAGNOSIS — Z23 NEED FOR VACCINATION: Primary | ICD-10-CM

## 2021-03-26 LAB
ALBUMIN SERPL-MCNC: 5 G/DL (ref 3.8–4.8)
ALBUMIN/GLOB SERPL: 2.3 {RATIO} (ref 1.2–2.2)
ALP SERPL-CCNC: 55 IU/L (ref 39–117)
ALT SERPL-CCNC: 41 IU/L (ref 0–44)
AST SERPL-CCNC: 28 IU/L (ref 0–40)
BASOPHILS # BLD AUTO: 0 X10E3/UL (ref 0–0.2)
BASOPHILS NFR BLD AUTO: 1 %
BILIRUB SERPL-MCNC: 0.7 MG/DL (ref 0–1.2)
BUN SERPL-MCNC: 19 MG/DL (ref 8–27)
BUN/CREAT SERPL: 18 (ref 10–24)
CALCIUM SERPL-MCNC: 10.1 MG/DL (ref 8.6–10.2)
CHLORIDE SERPL-SCNC: 101 MMOL/L (ref 96–106)
CO2 SERPL-SCNC: 19 MMOL/L (ref 20–29)
CREAT SERPL-MCNC: 1.04 MG/DL (ref 0.76–1.27)
EOSINOPHIL # BLD AUTO: 0.1 X10E3/UL (ref 0–0.4)
EOSINOPHIL NFR BLD AUTO: 2 %
ERYTHROCYTE [DISTWIDTH] IN BLOOD BY AUTOMATED COUNT: 12.8 % (ref 11.6–15.4)
GLOBULIN SER CALC-MCNC: 2.2 G/DL (ref 1.5–4.5)
GLUCOSE SERPL-MCNC: 96 MG/DL (ref 65–99)
HCT VFR BLD AUTO: 46.1 % (ref 37.5–51)
HGB BLD-MCNC: 16 G/DL (ref 13–17.7)
IMM GRANULOCYTES # BLD AUTO: 0 X10E3/UL (ref 0–0.1)
IMM GRANULOCYTES NFR BLD AUTO: 0 %
LYMPHOCYTES # BLD AUTO: 1.8 X10E3/UL (ref 0.7–3.1)
LYMPHOCYTES NFR BLD AUTO: 48 %
MCH RBC QN AUTO: 34.8 PG (ref 26.6–33)
MCHC RBC AUTO-ENTMCNC: 34.7 G/DL (ref 31.5–35.7)
MCV RBC AUTO: 100 FL (ref 79–97)
MONOCYTES # BLD AUTO: 0.3 X10E3/UL (ref 0.1–0.9)
MONOCYTES NFR BLD AUTO: 8 %
NEUTROPHILS # BLD AUTO: 1.5 X10E3/UL (ref 1.4–7)
NEUTROPHILS NFR BLD AUTO: 41 %
PLATELET # BLD AUTO: 193 X10E3/UL (ref 150–450)
POTASSIUM SERPL-SCNC: 4.7 MMOL/L (ref 3.5–5.2)
PROT SERPL-MCNC: 7.2 G/DL (ref 6–8.5)
RBC # BLD AUTO: 4.6 X10E6/UL (ref 4.14–5.8)
SODIUM SERPL-SCNC: 140 MMOL/L (ref 134–144)
SPECIMEN STATUS REPORT: NORMAL
WBC # BLD AUTO: 3.7 X10E3/UL (ref 3.4–10.8)

## 2021-03-26 PROCEDURE — 91300 COVID-19, MRNA, LNP-S, PF, 30 MCG/0.3 ML DOSE VACCINE: ICD-10-PCS | Mod: S$GLB,,, | Performed by: FAMILY MEDICINE

## 2021-03-26 PROCEDURE — 0002A COVID-19, MRNA, LNP-S, PF, 30 MCG/0.3 ML DOSE VACCINE: CPT | Mod: CV19,S$GLB,, | Performed by: FAMILY MEDICINE

## 2021-03-26 PROCEDURE — 0002A COVID-19, MRNA, LNP-S, PF, 30 MCG/0.3 ML DOSE VACCINE: ICD-10-PCS | Mod: CV19,S$GLB,, | Performed by: FAMILY MEDICINE

## 2021-03-26 PROCEDURE — 91300 COVID-19, MRNA, LNP-S, PF, 30 MCG/0.3 ML DOSE VACCINE: CPT | Mod: S$GLB,,, | Performed by: FAMILY MEDICINE

## 2021-03-31 ENCOUNTER — OFFICE VISIT (OUTPATIENT)
Dept: CARDIOLOGY | Facility: CLINIC | Age: 61
End: 2021-03-31
Payer: OTHER GOVERNMENT

## 2021-03-31 VITALS
BODY MASS INDEX: 28.85 KG/M2 | SYSTOLIC BLOOD PRESSURE: 120 MMHG | OXYGEN SATURATION: 99 % | WEIGHT: 169 LBS | HEIGHT: 64 IN | HEART RATE: 49 BPM | DIASTOLIC BLOOD PRESSURE: 80 MMHG

## 2021-03-31 DIAGNOSIS — I48.3 TYPICAL ATRIAL FLUTTER: Primary | ICD-10-CM

## 2021-03-31 DIAGNOSIS — I10 ESSENTIAL HYPERTENSION: ICD-10-CM

## 2021-03-31 PROCEDURE — 99214 OFFICE O/P EST MOD 30 MIN: CPT | Mod: S$GLB,,, | Performed by: SPECIALIST

## 2021-03-31 PROCEDURE — 99214 PR OFFICE/OUTPT VISIT, EST, LEVL IV, 30-39 MIN: ICD-10-PCS | Mod: S$GLB,,, | Performed by: SPECIALIST

## 2021-04-16 ENCOUNTER — OFFICE VISIT (OUTPATIENT)
Dept: FAMILY MEDICINE | Facility: CLINIC | Age: 61
End: 2021-04-16
Payer: OTHER GOVERNMENT

## 2021-04-16 VITALS
HEIGHT: 64 IN | WEIGHT: 170.81 LBS | DIASTOLIC BLOOD PRESSURE: 72 MMHG | HEART RATE: 49 BPM | SYSTOLIC BLOOD PRESSURE: 133 MMHG | TEMPERATURE: 74 F | OXYGEN SATURATION: 98 % | BODY MASS INDEX: 29.16 KG/M2

## 2021-04-16 DIAGNOSIS — R73.02 IGT (IMPAIRED GLUCOSE TOLERANCE): ICD-10-CM

## 2021-04-16 DIAGNOSIS — I48.3 TYPICAL ATRIAL FLUTTER: ICD-10-CM

## 2021-04-16 DIAGNOSIS — I10 ESSENTIAL HYPERTENSION: Primary | ICD-10-CM

## 2021-04-16 PROCEDURE — 99214 OFFICE O/P EST MOD 30 MIN: CPT | Mod: S$PBB,,, | Performed by: FAMILY MEDICINE

## 2021-04-16 PROCEDURE — 99214 OFFICE O/P EST MOD 30 MIN: CPT | Performed by: FAMILY MEDICINE

## 2021-04-16 PROCEDURE — 99214 PR OFFICE/OUTPT VISIT, EST, LEVL IV, 30-39 MIN: ICD-10-PCS | Mod: S$PBB,,, | Performed by: FAMILY MEDICINE

## 2021-04-16 RX ORDER — ZOSTER VACCINE RECOMBINANT, ADJUVANTED 50 MCG/0.5
KIT INTRAMUSCULAR
COMMUNITY
Start: 2020-10-24 | End: 2022-06-16

## 2021-05-09 ENCOUNTER — PATIENT MESSAGE (OUTPATIENT)
Dept: FAMILY MEDICINE | Facility: CLINIC | Age: 61
End: 2021-05-09

## 2021-05-10 DIAGNOSIS — J30.2 PERENNIAL ALLERGIC RHINITIS WITH SEASONAL VARIATION: ICD-10-CM

## 2021-05-10 DIAGNOSIS — J30.89 PERENNIAL ALLERGIC RHINITIS WITH SEASONAL VARIATION: ICD-10-CM

## 2021-05-10 RX ORDER — FLUTICASONE PROPIONATE 50 MCG
SPRAY, SUSPENSION (ML) NASAL
Qty: 48 G | Refills: 11 | Status: SHIPPED | OUTPATIENT
Start: 2021-05-10

## 2021-10-01 ENCOUNTER — IMMUNIZATION (OUTPATIENT)
Dept: PRIMARY CARE CLINIC | Facility: CLINIC | Age: 61
End: 2021-10-01
Payer: OTHER GOVERNMENT

## 2021-10-01 DIAGNOSIS — Z23 NEED FOR VACCINATION: Primary | ICD-10-CM

## 2021-10-01 PROCEDURE — 91300 COVID-19, MRNA, LNP-S, PF, 30 MCG/0.3 ML DOSE VACCINE: CPT | Mod: S$GLB,,, | Performed by: FAMILY MEDICINE

## 2021-10-01 PROCEDURE — 0003A COVID-19, MRNA, LNP-S, PF, 30 MCG/0.3 ML DOSE VACCINE: ICD-10-PCS | Mod: S$GLB,,, | Performed by: FAMILY MEDICINE

## 2021-10-01 PROCEDURE — 91300 COVID-19, MRNA, LNP-S, PF, 30 MCG/0.3 ML DOSE VACCINE: ICD-10-PCS | Mod: S$GLB,,, | Performed by: FAMILY MEDICINE

## 2021-10-01 PROCEDURE — 0003A COVID-19, MRNA, LNP-S, PF, 30 MCG/0.3 ML DOSE VACCINE: CPT | Mod: S$GLB,,, | Performed by: FAMILY MEDICINE

## 2021-11-17 ENCOUNTER — OFFICE VISIT (OUTPATIENT)
Dept: CARDIOLOGY | Facility: CLINIC | Age: 61
End: 2021-11-17
Payer: OTHER GOVERNMENT

## 2021-11-17 VITALS
OXYGEN SATURATION: 98 % | DIASTOLIC BLOOD PRESSURE: 76 MMHG | BODY MASS INDEX: 29.37 KG/M2 | HEIGHT: 64 IN | HEART RATE: 56 BPM | WEIGHT: 172 LBS | SYSTOLIC BLOOD PRESSURE: 140 MMHG

## 2021-11-17 DIAGNOSIS — I48.4 ATYPICAL ATRIAL FLUTTER: Primary | ICD-10-CM

## 2021-11-17 DIAGNOSIS — R00.1 BRADYCARDIA: ICD-10-CM

## 2021-11-17 DIAGNOSIS — I10 ESSENTIAL HYPERTENSION: ICD-10-CM

## 2021-11-17 PROCEDURE — 99215 OFFICE O/P EST HI 40 MIN: CPT | Mod: S$GLB,,, | Performed by: SPECIALIST

## 2021-11-17 PROCEDURE — 99215 PR OFFICE/OUTPT VISIT, EST, LEVL V, 40-54 MIN: ICD-10-PCS | Mod: S$GLB,,, | Performed by: SPECIALIST

## 2021-12-29 ENCOUNTER — PATIENT MESSAGE (OUTPATIENT)
Dept: UROLOGY | Facility: CLINIC | Age: 61
End: 2021-12-29
Payer: OTHER GOVERNMENT

## 2022-01-28 DIAGNOSIS — E78.2 MULTIPLE-TYPE HYPERLIPIDEMIA: ICD-10-CM

## 2022-01-28 RX ORDER — ROSUVASTATIN CALCIUM 10 MG/1
TABLET, COATED ORAL
Qty: 90 TABLET | Refills: 0 | Status: SHIPPED | OUTPATIENT
Start: 2022-01-28 | End: 2022-03-16 | Stop reason: SDUPTHER

## 2022-02-02 ENCOUNTER — PATIENT MESSAGE (OUTPATIENT)
Dept: UROLOGY | Facility: CLINIC | Age: 62
End: 2022-02-02
Payer: OTHER GOVERNMENT

## 2022-02-03 ENCOUNTER — PATIENT MESSAGE (OUTPATIENT)
Dept: FAMILY MEDICINE | Facility: CLINIC | Age: 62
End: 2022-02-03
Payer: OTHER GOVERNMENT

## 2022-02-04 ENCOUNTER — PATIENT MESSAGE (OUTPATIENT)
Dept: FAMILY MEDICINE | Facility: CLINIC | Age: 62
End: 2022-02-04

## 2022-02-04 ENCOUNTER — OFFICE VISIT (OUTPATIENT)
Dept: FAMILY MEDICINE | Facility: CLINIC | Age: 62
End: 2022-02-04
Payer: OTHER GOVERNMENT

## 2022-02-04 VITALS
TEMPERATURE: 99 F | SYSTOLIC BLOOD PRESSURE: 138 MMHG | BODY MASS INDEX: 29.68 KG/M2 | DIASTOLIC BLOOD PRESSURE: 84 MMHG | OXYGEN SATURATION: 99 % | RESPIRATION RATE: 16 BRPM | HEART RATE: 63 BPM | WEIGHT: 172.88 LBS

## 2022-02-04 DIAGNOSIS — Z00.00 PREVENTATIVE HEALTH CARE: ICD-10-CM

## 2022-02-04 DIAGNOSIS — K64.8 INTERNAL HEMORRHOID: ICD-10-CM

## 2022-02-04 DIAGNOSIS — E78.2 MULTIPLE-TYPE HYPERLIPIDEMIA: Primary | ICD-10-CM

## 2022-02-04 DIAGNOSIS — Z12.5 ENCOUNTER FOR PROSTATE CANCER SCREENING: ICD-10-CM

## 2022-02-04 DIAGNOSIS — R73.02 IGT (IMPAIRED GLUCOSE TOLERANCE): ICD-10-CM

## 2022-02-04 DIAGNOSIS — I10 ESSENTIAL HYPERTENSION: ICD-10-CM

## 2022-02-04 PROCEDURE — 99213 PR OFFICE/OUTPT VISIT, EST, LEVL III, 20-29 MIN: ICD-10-PCS | Mod: S$PBB,,, | Performed by: FAMILY MEDICINE

## 2022-02-04 PROCEDURE — 99213 OFFICE O/P EST LOW 20 MIN: CPT | Performed by: FAMILY MEDICINE

## 2022-02-04 PROCEDURE — 99213 OFFICE O/P EST LOW 20 MIN: CPT | Mod: S$PBB,,, | Performed by: FAMILY MEDICINE

## 2022-02-04 RX ORDER — HYDROCORTISONE 25 MG/G
CREAM TOPICAL 2 TIMES DAILY
Qty: 15 G | Refills: 1 | Status: SHIPPED | OUTPATIENT
Start: 2022-02-04 | End: 2022-02-04

## 2022-02-04 RX ORDER — HYDROCORTISONE 25 MG/G
CREAM TOPICAL 2 TIMES DAILY
Qty: 15 G | Refills: 1 | Status: SHIPPED | OUTPATIENT
Start: 2022-02-04 | End: 2022-03-28

## 2022-02-04 NOTE — PATIENT INSTRUCTIONS
hPatient Education       Hemorrhoids Discharge Instructions   About this topic   Hemorrhoids are swollen veins in the rectum. Your rectum is where stool leaves your body. You may be able to see or feel your hemorrhoids outside of your body, but some hemorrhoids are inside of your rectum and cannot be seen. Hemorrhoids can cause itching, pain, and bleeding. Being constipated or having hard stools can make your hemorrhoids worse.     What care is needed at home?   · Ask your doctor what you need to do when you go home. Make sure you ask questions if you do not understand what the doctor says. This way you will know what you need to do.  · Soak your bottom in a few inches of warm water for 10 to 15 minutes at a time. You can do this 2 to 3 times each day. Do not add soap, bubble bath, or anything to the water.  · Use over-the-counter medicines to treat your hemorrhoids. These include ointments and creams to help with pain and swelling. You can also use a product like witch hazel to help dry out the skin in the area.  · To help with constipation:  ? Use stool softeners when needed.  Metamucil or Citrucel  ? Eat high-fiber foods. These include whole grains, fruits, and vegetables.  ? Drink plenty of water and other fluids each day. This helps to keep your stools soft.  ? Set a regular schedule to try and have a bowel movement. Do not ignore the urge to go to the bathroom. Dont hold it in.  ? Give yourself plenty of time to have a bowel movement, but do not linger on the toilet either, by sitting and reading for a long time.  ? Do mild exercise each day like taking a walk.  · Avoid heavy lifting or straining while the hemorrhoid is healing.  What follow-up care is needed?   If your problem does not get better, other care may be needed. Your doctor may ask you to make visits to the office to check on your progress. Be sure to keep these visits.  What drugs may be needed?   The doctor may order drugs to:  · Help with pain  and swelling  · Ease itching  · Soften stools  Will physical activity be limited?   · Working out can help with digestion. It might help keep you from having hard stools. Ask your doctor about the best kind of exercise for you.  What problems could happen?   · You may have very bad bleeding.  · Sometimes, treatments do not work. Some hemorrhoids are very large. You might need surgery for either of these.  When do I need to call the doctor?   · You have a lot of bleeding from your rectum.  · Your bowel movement looks like tar.  · You are not able to pass stool because of pain from your hemorrhoids.  · Your pain gets worse and is not helped by over-the-counter medicines, warm water, or your home care.  · You have a fever of 100.4°F (38°C) or higher.  Teach Back: Helping You Understand   The Teach Back Method helps you understand the information we are giving you. After you talk with the staff, tell them in your own words what you learned. This helps to make sure the staff has described each thing clearly. It also helps to explain things that may have been confusing. Before going home, make sure you can do these:  · I can tell you about my condition.  · I can tell you what may help ease my pain.  · I can tell you what I will do if I have blood in my rectum.  Where can I learn more?   American Academy of Family Physicians  https://familydoctor.org/condition/hemorrhoids/   National Digestive Disease Information Clearinghouse  https://www.niddk.nih.gov/health-information/digestive-diseases/hemorrhoids/definition-facts   Last Reviewed Date   2021-09-28  Consumer Information Use and Disclaimer   This information is not specific medical advice and does not replace information you receive from your health care provider. This is only a brief summary of general information. It does NOT include all information about conditions, illnesses, injuries, tests, procedures, treatments, therapies, discharge instructions or life-style  choices that may apply to you. You must talk with your health care provider for complete information about your health and treatment options. This information should not be used to decide whether or not to accept your health care providers advice, instructions or recommendations. Only your health care provider has the knowledge and training to provide advice that is right for you.  Copyright   Copyright © 2021 [employer] and its affiliates and/or licensors. All rights reserved.

## 2022-02-04 NOTE — PROGRESS NOTES
Subjective:       Patient ID: Patrice Smith is a 61 y.o. male.    Chief Complaint: Hemorrhoids (For the last week)      Patient is here because of hemorrhoids.  They had he had hemorrhoids a number of years ago but has not had any problems with recently.  He does have a admit to a change in his diet because of his wife's alterations in her diet.  And having episodes of constipation.  Denies bleeding but does report some pain and swelling.  Lab Results       Component                Value               Date                       WBC                      3.7                 03/25/2021                 HGB                      16.0                03/25/2021                 HCT                      46.1                03/25/2021                 PLT                      193                 03/25/2021                 CHOL                     124                 12/16/2020                 TRIG                     120                 12/16/2020                 HDL                      54                  12/16/2020                 ALT                      41                  03/25/2021                 AST                      28                  03/25/2021                 NA                       140                 03/25/2021                 K                        4.7                 03/25/2021                 CL                       101                 03/25/2021                 CREATININE               1.04                03/25/2021                 BUN                      19                  03/25/2021                 CO2                      19 (L)              03/25/2021                 TSH                      4.610               12/15/2020                 PSA                      1.6                 02/06/2014                 HGBA1C                   5.8                 12/15/2020                 MICROALBUR               <3.0                04/02/2019            Patient is due for his annual well visit in the  end of March.      Allergies and Medications:   Review of patient's allergies indicates:  No Known Allergies  Current Outpatient Medications   Medication Sig Dispense Refill    fluticasone propionate (FLONASE) 50 mcg/actuation nasal spray USE 1 SPRAY IN EACH NOSTRIL TWICE A DAY AS NEEDED 48 g 11    irbesartan (AVAPRO) 150 MG tablet TAKE 1 TABLET DAILY 90 tablet 0    multivitamin capsule Take 1 capsule by mouth once daily.      pantoprazole (PROTONIX) 40 MG tablet TAKE 1 TABLET DAILY 90 tablet 3    rosuvastatin (CRESTOR) 10 MG tablet TAKE 1 TABLET DAILY 90 tablet 0    tadalafil (CIALIS) 20 MG Tab Take 1 tablet (20 mg total) by mouth twice a week. As needed 30 tablet 3    vitamin A-vit C-vit E-zinc-Cu Tab Take by mouth once daily.       aspirin 81 MG Chew Take 1 tablet (81 mg total) by mouth once daily.  0    flu vac qs 21-22,6ms up,jamie/PF (FLUCELVAX QUAD 4217-2381, PF, IM)       fluorouraciL (EFUDEX) 5 % cream Apply twice a day to nose for 3 weeks. (Patient not taking: No sig reported) 40 g 0    hydrocortisone 2.5 % cream Apply topically 2 (two) times daily. 15 g 1    irbesartan (AVAPRO) 150 MG tablet       varicella-zoster gE-AS01B, PF, (SHINGRIX, PF,) 50 mcg/0.5 mL injection        No current facility-administered medications for this visit.       Family History:   Family History   Problem Relation Age of Onset    Parkinsonism Mother     Hypertension Mother     Ulcers Father     Arthritis Father     Diabetes Father     Hypertension Brother     Cancer Brother     Diabetes Brother     Anesthesia problems Neg Hx     Clotting disorder Neg Hx        Social History:   Social History     Socioeconomic History    Marital status:    Occupational History    Occupation: [employer]    Tobacco Use    Smoking status: Never Smoker    Smokeless tobacco: Never Used   Substance and Sexual Activity    Alcohol use: Yes     Comment: social    Drug use: No     Social Determinants of Health      Financial Resource Strain: Low Risk     Difficulty of Paying Living Expenses: Not hard at all   Food Insecurity: No Food Insecurity    Worried About Running Out of Food in the Last Year: Never true    Ran Out of Food in the Last Year: Never true   Transportation Needs: No Transportation Needs    Lack of Transportation (Medical): No    Lack of Transportation (Non-Medical): No   Physical Activity: Sufficiently Active    Days of Exercise per Week: 5 days    Minutes of Exercise per Session: 30 min   Stress: No Stress Concern Present    Feeling of Stress : Not at all   Social Connections: Unknown    Frequency of Communication with Friends and Family: Three times a week    Frequency of Social Gatherings with Friends and Family: More than three times a week    Active Member of Clubs or Organizations: Yes    Attends Club or Organization Meetings: 1 to 4 times per year    Marital Status:    Housing Stability: Low Risk     Unable to Pay for Housing in the Last Year: No    Number of Places Lived in the Last Year: 1    Unstable Housing in the Last Year: No       Review of Systems    Objective:     Vitals:    02/04/22 0905   BP: 138/84   Pulse: 63   Resp: 16   Temp: 98.8 °F (37.1 °C)        Physical Exam  Abdominal:      General: There is no distension.      Palpations: There is no mass.      Tenderness: There is no abdominal tenderness. There is no right CVA tenderness, left CVA tenderness, guarding or rebound.      Hernia: No hernia is present.   Genitourinary:     Penis: Normal.       Testes: Normal.      Prostate: Normal.      Rectum: Guaiac result negative.             Assessment:       1. Multiple-type hyperlipidemia    2. Essential hypertension    3. IGT (impaired glucose tolerance)    4. Preventative health care    5. Encounter for prostate cancer screening    6. Internal hemorrhoid        Plan:       Patrice was seen today for hemorrhoids.    Diagnoses and all orders for this  visit:    Multiple-type hyperlipidemia  -     Cancel: Lipid Panel; Future  -     Cancel: Comprehensive Metabolic Panel; Future  -     Comprehensive Metabolic Panel; Future  -     Lipid Panel; Future  -     Comprehensive Metabolic Panel  -     Lipid Panel    Essential hypertension    IGT (impaired glucose tolerance)  -     Cancel: Hemoglobin A1C; Future  -     Hemoglobin A1C; Future  -     Hemoglobin A1C    Preventative health care  -     Cancel: HIV 1/2 Ag/Ab (4th Gen); Future  -     HIV 1/2 Ag/Ab (4th Gen); Future  -     HIV 1/2 Ag/Ab (4th Gen)    Encounter for prostate cancer screening  -     Cancel: PSA, Screening; Future  -     PSA, Screening; Future  -     PSA, Screening    Internal hemorrhoid  -     hydrocortisone 2.5 % cream; Apply topically 2 (two) times daily.         Follow up in about 1 month (around 3/4/2022) for annual as scheduled..

## 2022-02-24 ENCOUNTER — PATIENT MESSAGE (OUTPATIENT)
Dept: FAMILY MEDICINE | Facility: CLINIC | Age: 62
End: 2022-02-24
Payer: OTHER GOVERNMENT

## 2022-02-26 LAB
ALBUMIN SERPL-MCNC: 4.9 G/DL (ref 3.8–4.8)
ALBUMIN/GLOB SERPL: 2.2 {RATIO} (ref 1.2–2.2)
ALP SERPL-CCNC: 61 IU/L (ref 44–121)
ALT SERPL-CCNC: 72 IU/L (ref 0–44)
AST SERPL-CCNC: 47 IU/L (ref 0–40)
BILIRUB SERPL-MCNC: 0.7 MG/DL (ref 0–1.2)
BUN SERPL-MCNC: 16 MG/DL (ref 8–27)
BUN/CREAT SERPL: 17 (ref 10–24)
CALCIUM SERPL-MCNC: 9.3 MG/DL (ref 8.6–10.2)
CHLORIDE SERPL-SCNC: 101 MMOL/L (ref 96–106)
CHOLEST SERPL-MCNC: 120 MG/DL (ref 100–199)
CO2 SERPL-SCNC: 21 MMOL/L (ref 20–29)
CREAT SERPL-MCNC: 0.95 MG/DL (ref 0.76–1.27)
GLOBULIN SER CALC-MCNC: 2.2 G/DL (ref 1.5–4.5)
GLUCOSE SERPL-MCNC: 105 MG/DL (ref 65–99)
HBA1C MFR BLD: 5.8 % (ref 4.8–5.6)
HDLC SERPL-MCNC: 53 MG/DL
HIV 1+2 AB+HIV1 P24 AG SERPL QL IA: NON REACTIVE
LDLC SERPL CALC-MCNC: 48 MG/DL (ref 0–99)
POTASSIUM SERPL-SCNC: 4.4 MMOL/L (ref 3.5–5.2)
PROT SERPL-MCNC: 7.1 G/DL (ref 6–8.5)
PSA SERPL-MCNC: 1.5 NG/ML (ref 0–4)
SODIUM SERPL-SCNC: 138 MMOL/L (ref 134–144)
TRIGL SERPL-MCNC: 106 MG/DL (ref 0–149)
VLDLC SERPL CALC-MCNC: 19 MG/DL (ref 5–40)

## 2022-02-28 ENCOUNTER — TELEPHONE (OUTPATIENT)
Dept: FAMILY MEDICINE | Facility: CLINIC | Age: 62
End: 2022-02-28
Payer: OTHER GOVERNMENT

## 2022-03-10 ENCOUNTER — OFFICE VISIT (OUTPATIENT)
Dept: FAMILY MEDICINE | Facility: CLINIC | Age: 62
End: 2022-03-10
Payer: OTHER GOVERNMENT

## 2022-03-10 VITALS
RESPIRATION RATE: 18 BRPM | TEMPERATURE: 98 F | OXYGEN SATURATION: 99 % | WEIGHT: 176 LBS | BODY MASS INDEX: 30.05 KG/M2 | HEIGHT: 64 IN | SYSTOLIC BLOOD PRESSURE: 150 MMHG | HEART RATE: 70 BPM | DIASTOLIC BLOOD PRESSURE: 90 MMHG

## 2022-03-10 DIAGNOSIS — Z00.00 PREVENTATIVE HEALTH CARE: Primary | ICD-10-CM

## 2022-03-10 DIAGNOSIS — I10 ESSENTIAL HYPERTENSION: ICD-10-CM

## 2022-03-10 DIAGNOSIS — E78.2 MULTIPLE-TYPE HYPERLIPIDEMIA: ICD-10-CM

## 2022-03-10 DIAGNOSIS — E66.9 OBESITY (BMI 30.0-34.9): ICD-10-CM

## 2022-03-10 DIAGNOSIS — R73.02 IGT (IMPAIRED GLUCOSE TOLERANCE): ICD-10-CM

## 2022-03-10 PROCEDURE — 99396 PREV VISIT EST AGE 40-64: CPT | Mod: S$PBB,,, | Performed by: FAMILY MEDICINE

## 2022-03-10 PROCEDURE — 99214 OFFICE O/P EST MOD 30 MIN: CPT | Performed by: FAMILY MEDICINE

## 2022-03-10 PROCEDURE — 99396 PR PREVENTIVE VISIT,EST,40-64: ICD-10-PCS | Mod: S$PBB,,, | Performed by: FAMILY MEDICINE

## 2022-03-10 RX ORDER — FLUOROURACIL 50 MG/G
CREAM TOPICAL
COMMUNITY
Start: 2021-09-27 | End: 2022-03-28

## 2022-03-10 RX ORDER — IRBESARTAN 300 MG/1
300 TABLET ORAL NIGHTLY
Qty: 90 TABLET | Refills: 3 | Status: SHIPPED | OUTPATIENT
Start: 2022-03-10 | End: 2023-03-10

## 2022-03-10 NOTE — PROGRESS NOTES
Subjective:       Patient ID: Patrice Smith is a 62 y.o. male.    Chief Complaint: Annual Exam      Patient is here for his annual well visit today.  Reports no new medical problems.  Patient Active Problem List:     High thyroid stimulating hormone (TSH) level     Gastroesophageal reflux disease without esophagitis     Hypertension     Multiple-type hyperlipidemia     Obstructive sleep apnea syndrome- CPAP at 4-12     Onychomycosis of toenail     Terminal esophageal web     Perennial allergic rhinitis with seasonal variation     Family history of prostate cancer     Other male erectile dysfunction     Overweight     IGT (impaired glucose tolerance)     Immunization due     Palpitation     Essential hypertension     Atrial flutter  Lab Results       Component                Value               Date                       WBC                      3.7                 03/25/2021                 HGB                      16.0                03/25/2021                 HCT                      46.1                03/25/2021                 PLT                      193                 03/25/2021                 CHOL                     120                 02/25/2022                 TRIG                     106                 02/25/2022                 HDL                      53                  02/25/2022                 ALT                      72 (H)              02/25/2022                 AST                      47 (H)              02/25/2022                 NA                       138                 02/25/2022                 K                        4.4                 02/25/2022                 CL                       101                 02/25/2022                 CREATININE               0.95                02/25/2022                 BUN                      16                  02/25/2022                 CO2                      21                  02/25/2022                 TSH                      4.610                12/15/2020                 PSA                      1.6                 02/06/2014                 HGBA1C                   5.8 (H)             02/25/2022  impaired fasting glucose a               MICROALBUR               <3.0                04/02/2019            Wt Readings from Last 3 Encounters:  03/10/22 : 79.8 kg (176 lb)  02/04/22 : 78.4 kg (172 lb 14.4 oz)  11/17/21 : 78 kg (172 lb)  PSA was 1.5.  BP at home 138/70.        Allergies and Medications:   Review of patient's allergies indicates:  No Known Allergies  Current Outpatient Medications   Medication Sig Dispense Refill    aspirin 81 MG Chew Take 1 tablet (81 mg total) by mouth once daily.  0    flu vac qs 21-22,6ms up,jamie/PF (FLUCELVAX QUAD 4470-8855, PF, IM)       fluorouraciL (EFUDEX) 5 % cream       fluticasone propionate (FLONASE) 50 mcg/actuation nasal spray USE 1 SPRAY IN EACH NOSTRIL TWICE A DAY AS NEEDED 48 g 11    hydrocortisone 2.5 % cream Apply topically 2 (two) times daily. 15 g 1    multivitamin capsule Take 1 capsule by mouth once daily.      pantoprazole (PROTONIX) 40 MG tablet TAKE 1 TABLET DAILY 90 tablet 3    rosuvastatin (CRESTOR) 10 MG tablet TAKE 1 TABLET DAILY 90 tablet 0    tadalafil (CIALIS) 20 MG Tab Take 1 tablet (20 mg total) by mouth twice a week. As needed 30 tablet 3    varicella-zoster gE-AS01B, PF, (SHINGRIX, PF,) 50 mcg/0.5 mL injection       vitamin A-vit C-vit E-zinc-Cu Tab Take by mouth once daily.       irbesartan (AVAPRO) 300 MG tablet Take 1 tablet (300 mg total) by mouth every evening. 90 tablet 3     No current facility-administered medications for this visit.       Family History:   Family History   Problem Relation Age of Onset    Parkinsonism Mother     Hypertension Mother     Ulcers Father     Arthritis Father     Diabetes Father     Hypertension Brother     Cancer Brother     Diabetes Brother     Anesthesia problems Neg Hx     Clotting disorder Neg Hx        Social History:    Social History     Socioeconomic History    Marital status:    Occupational History    Occupation: [employer].    Tobacco Use    Smoking status: Never Smoker    Smokeless tobacco: Never Used   Substance and Sexual Activity    Alcohol use: Yes     Comment: social    Drug use: No     Social Determinants of Health     Financial Resource Strain: Low Risk     Difficulty of Paying Living Expenses: Not hard at all   Food Insecurity: No Food Insecurity    Worried About Running Out of Food in the Last Year: Never true    Ran Out of Food in the Last Year: Never true   Transportation Needs: No Transportation Needs    Lack of Transportation (Medical): No    Lack of Transportation (Non-Medical): No   Physical Activity: Sufficiently Active    Days of Exercise per Week: 5 days    Minutes of Exercise per Session: 30 min   Stress: No Stress Concern Present    Feeling of Stress : Not at all   Social Connections: Unknown    Frequency of Communication with Friends and Family: Three times a week    Frequency of Social Gatherings with Friends and Family: More than three times a week    Active Member of Clubs or Organizations: Yes    Attends Club or Organization Meetings: 1 to 4 times per year    Marital Status:    Housing Stability: Low Risk     Unable to Pay for Housing in the Last Year: No    Number of Places Lived in the Last Year: 1    Unstable Housing in the Last Year: No       Review of Systems   Constitutional: Positive for unexpected weight change (4 lb weight gain).   Gastrointestinal:        Hemorrhoids improving.   Genitourinary:        Since starting CPAP he does not have any nocturnal urination.   Musculoskeletal: Positive for neck pain (rare intermitant).       Objective:     Vitals:    03/10/22 1041   BP: (!) 150/90   Pulse: 70   Resp: 18   Temp: 98.3 °F (36.8 °C)        Physical Exam    Assessment:       1. Preventative health care    2. Essential hypertension    3. Multiple-type  hyperlipidemia    4. IGT (impaired glucose tolerance)    5. Obesity (BMI 30.0-34.9)        Plan:       Patrice was seen today for annual exam.    Diagnoses and all orders for this visit:    Preventative health care    Essential hypertension  -     irbesartan (AVAPRO) 300 MG tablet; Take 1 tablet (300 mg total) by mouth every evening.    Multiple-type hyperlipidemia  -     Lipid Panel; Future  -     Comprehensive Metabolic Panel; Future    IGT (impaired glucose tolerance)  -     Hemoglobin A1C; Future    Obesity (BMI 30.0-34.9)  -     Vitamin D; Future         No follow-ups on file.

## 2022-03-17 ENCOUNTER — HOSPITAL ENCOUNTER (EMERGENCY)
Facility: HOSPITAL | Age: 62
Discharge: HOME | End: 2022-03-17
Attending: EMERGENCY MEDICINE
Payer: OTHER GOVERNMENT

## 2022-03-17 VITALS
SYSTOLIC BLOOD PRESSURE: 147 MMHG | RESPIRATION RATE: 10 BRPM | DIASTOLIC BLOOD PRESSURE: 78 MMHG | WEIGHT: 168 LBS | BODY MASS INDEX: 28.68 KG/M2 | HEART RATE: 48 BPM | TEMPERATURE: 99 F | OXYGEN SATURATION: 97 % | HEIGHT: 64 IN

## 2022-03-17 DIAGNOSIS — R07.9 CHEST PAIN: ICD-10-CM

## 2022-03-17 DIAGNOSIS — I48.3 TYPICAL ATRIAL FLUTTER: ICD-10-CM

## 2022-03-17 DIAGNOSIS — R00.2 PALPITATIONS: Primary | ICD-10-CM

## 2022-03-17 LAB
ALBUMIN SERPL BCP-MCNC: 5 G/DL (ref 3.5–5.2)
ALP SERPL-CCNC: 53 U/L (ref 55–135)
ALT SERPL W/O P-5'-P-CCNC: 53 U/L (ref 10–44)
ANION GAP SERPL CALC-SCNC: 10 MMOL/L (ref 8–16)
AST SERPL-CCNC: 28 U/L (ref 10–40)
BASOPHILS # BLD AUTO: 0.03 K/UL (ref 0–0.2)
BASOPHILS NFR BLD: 0.4 % (ref 0–1.9)
BILIRUB SERPL-MCNC: 1.3 MG/DL (ref 0.1–1)
BNP SERPL-MCNC: 25 PG/ML (ref 0–99)
BUN SERPL-MCNC: 16 MG/DL (ref 8–23)
CALCIUM SERPL-MCNC: 9.8 MG/DL (ref 8.7–10.5)
CHLORIDE SERPL-SCNC: 105 MMOL/L (ref 95–110)
CO2 SERPL-SCNC: 24 MMOL/L (ref 23–29)
CREAT SERPL-MCNC: 1.1 MG/DL (ref 0.5–1.4)
DIFFERENTIAL METHOD: ABNORMAL
EOSINOPHIL # BLD AUTO: 0.1 K/UL (ref 0–0.5)
EOSINOPHIL NFR BLD: 1.9 % (ref 0–8)
ERYTHROCYTE [DISTWIDTH] IN BLOOD BY AUTOMATED COUNT: 11.9 % (ref 11.5–14.5)
EST. GFR  (AFRICAN AMERICAN): >60 ML/MIN/1.73 M^2
EST. GFR  (NON AFRICAN AMERICAN): >60 ML/MIN/1.73 M^2
ETHANOL SERPL-MCNC: <5 MG/DL
GLUCOSE SERPL-MCNC: 115 MG/DL (ref 70–110)
HCT VFR BLD AUTO: 48.1 % (ref 40–54)
HGB BLD-MCNC: 17.2 G/DL (ref 14–18)
IMM GRANULOCYTES # BLD AUTO: 0.03 K/UL (ref 0–0.04)
IMM GRANULOCYTES NFR BLD AUTO: 0.4 % (ref 0–0.5)
LYMPHOCYTES # BLD AUTO: 1.4 K/UL (ref 1–4.8)
LYMPHOCYTES NFR BLD: 21 % (ref 18–48)
MAGNESIUM SERPL-MCNC: 2.1 MG/DL (ref 1.6–2.6)
MCH RBC QN AUTO: 33.8 PG (ref 27–31)
MCHC RBC AUTO-ENTMCNC: 35.8 G/DL (ref 32–36)
MCV RBC AUTO: 95 FL (ref 82–98)
MONOCYTES # BLD AUTO: 0.4 K/UL (ref 0.3–1)
MONOCYTES NFR BLD: 6.5 % (ref 4–15)
NEUTROPHILS # BLD AUTO: 4.7 K/UL (ref 1.8–7.7)
NEUTROPHILS NFR BLD: 69.8 % (ref 38–73)
NRBC BLD-RTO: 0 /100 WBC
PHOSPHATE SERPL-MCNC: 2.9 MG/DL (ref 2.7–4.5)
PLATELET # BLD AUTO: 237 K/UL (ref 150–450)
PMV BLD AUTO: 10.8 FL (ref 9.2–12.9)
POTASSIUM SERPL-SCNC: 3.5 MMOL/L (ref 3.5–5.1)
PROT SERPL-MCNC: 7.5 G/DL (ref 6–8.4)
RBC # BLD AUTO: 5.09 M/UL (ref 4.6–6.2)
SODIUM SERPL-SCNC: 139 MMOL/L (ref 136–145)
TROPONIN I SERPL DL<=0.01 NG/ML-MCNC: <0.03 NG/ML
TSH SERPL DL<=0.005 MIU/L-ACNC: 3.49 UIU/ML (ref 0.34–5.6)
WBC # BLD AUTO: 6.76 K/UL (ref 3.9–12.7)

## 2022-03-17 PROCEDURE — 99284 EMERGENCY DEPT VISIT MOD MDM: CPT | Mod: 25

## 2022-03-17 PROCEDURE — 83735 ASSAY OF MAGNESIUM: CPT | Performed by: NURSE PRACTITIONER

## 2022-03-17 PROCEDURE — 84100 ASSAY OF PHOSPHORUS: CPT | Performed by: NURSE PRACTITIONER

## 2022-03-17 PROCEDURE — 80053 COMPREHEN METABOLIC PANEL: CPT | Performed by: NURSE PRACTITIONER

## 2022-03-17 PROCEDURE — 84484 ASSAY OF TROPONIN QUANT: CPT | Performed by: NURSE PRACTITIONER

## 2022-03-17 PROCEDURE — 85025 COMPLETE CBC W/AUTO DIFF WBC: CPT | Performed by: NURSE PRACTITIONER

## 2022-03-17 PROCEDURE — 93010 EKG 12-LEAD: ICD-10-PCS | Mod: ,,, | Performed by: INTERNAL MEDICINE

## 2022-03-17 PROCEDURE — 93010 ELECTROCARDIOGRAM REPORT: CPT | Mod: ,,, | Performed by: INTERNAL MEDICINE

## 2022-03-17 PROCEDURE — 93005 ELECTROCARDIOGRAM TRACING: CPT | Performed by: INTERNAL MEDICINE

## 2022-03-17 PROCEDURE — 36415 COLL VENOUS BLD VENIPUNCTURE: CPT | Performed by: NURSE PRACTITIONER

## 2022-03-17 PROCEDURE — 84443 ASSAY THYROID STIM HORMONE: CPT | Performed by: NURSE PRACTITIONER

## 2022-03-17 PROCEDURE — 83880 ASSAY OF NATRIURETIC PEPTIDE: CPT | Performed by: NURSE PRACTITIONER

## 2022-03-17 PROCEDURE — 82077 ASSAY SPEC XCP UR&BREATH IA: CPT | Performed by: NURSE PRACTITIONER

## 2022-03-17 RX ORDER — ASPIRIN 325 MG
325 TABLET ORAL
Status: DISCONTINUED | OUTPATIENT
Start: 2022-03-17 | End: 2022-03-17

## 2022-03-18 NOTE — DISCHARGE INSTRUCTIONS
Please return to the emergency department if you experience any new concerning and/or worsening symptoms including palpitations, chest pain, persistent nausea/vomiting, sudden onset of significant sweating, jaw/left arm pain, severe headaches, persistent dizziness, visual changes, slurred speech, facial drooping, arm/leg weakness or numbness.    Please schedule close follow-up with your outpatient cardiologist within the week, if possible, for further evaluation of your intermittent atrial flutter.

## 2022-03-18 NOTE — FIRST PROVIDER EVALUATION
Emergency Department TeleTriage Encounter Note      CHIEF COMPLAINT    Chief Complaint   Patient presents with    Palpitations     X 1 hour. Pt was diagnosed with a flutter over a year ago and has had no issues since then.         VITAL SIGNS   Initial Vitals   BP Pulse Resp Temp SpO2   03/17/22 1855 03/17/22 1854 03/17/22 1854 03/17/22 1854 03/17/22 1854   (!) 158/96 (!) 112 16 98.7 °F (37.1 °C) 98 %      MAP       --                   ALLERGIES    Review of patient's allergies indicates:  No Known Allergies    PROVIDER TRIAGE NOTE  Hx of a-flutter. Reports 1h of palpitations.  Started abruptly 1h ago.  No pain.  Neg for sob.        ORDERS  Labs Reviewed - No data to display    ED Orders (720h ago, onward)    Start Ordered     Status Ordering Provider    03/17/22 2207 03/17/22 1906  Troponin I #2  Once Timed         Ordered CHAMPAGNE, COOPER A.    03/17/22 1915 03/17/22 1906  aspirin tablet 325 mg  ED 1 Time         Ordered CHAMPAGNE, COOPER A.    03/17/22 1907 03/17/22 1906  Vital signs  Every 15 min         Ordered CHAMPAGNE, COOPER A.    03/17/22 1907 03/17/22 1906  Cardiac Monitoring - Adult  Continuous        Comments: Notify Physician If:    Ordered CHAMPAGNE, COOPER A.    03/17/22 1907 03/17/22 1906  Pulse Oximetry Continuous  Continuous         Ordered CHAMPAGNE, COOPER A.    03/17/22 1907 03/17/22 1906  Diet NPO  Diet effective now         Ordered CHAMPAGNE, COOPER A.    03/17/22 1907 03/17/22 1906  Saline lock IV  Once         Ordered CHAMPAGNE, COOPER A.    03/17/22 1907 03/17/22 1906  EKG 12-lead  Once        Comments: Do not perform if previously done during this visit/ triage    Ordered CHAMPAGNE, COOPER A.    03/17/22 1907 03/17/22 1906  CBC auto differential  STAT         Ordered CHAMPAGNE, COOPER A.    03/17/22 1907 03/17/22 1906  Comprehensive metabolic panel  STAT         Ordered CHAMPAGNE, COOPER A.    03/17/22 1907 03/17/22 1906  Troponin I #1  STAT         Ordered CHAMPAGNE, COOPER A.    03/17/22 1907  03/17/22 1906  B-Type natriuretic peptide (BNP)  STAT         Ordered COOPER BERNAL    03/17/22 1907 03/17/22 1906  X-Ray Chest AP Portable  1 time imaging         Ordered COOPER BERNAL.            Virtual Visit Note: The provider triage portion of this emergency department evaluation and documentation was performed via [employer], a HIPAA-compliant telemedicine application, in concert with a tele-presenter in the room. A face to face patient evaluation with one of my colleagues will occur once the patient is placed in an emergency department room.      DISCLAIMER: This note was prepared with [employer] voice recognition transcription software. Garbled syntax, mangled pronouns, and other bizarre constructions may be attributed to that software system.

## 2022-03-18 NOTE — ED PROVIDER NOTES
Encounter Date: 3/17/2022       History     Chief Complaint   Patient presents with    Palpitations     X 1 hour. Pt was diagnosed with a flutter over a year ago and has had no issues since then.       HPI   60-year-old male with a past medical history of paroxysmal a flutter, GERD, hypertension presenting with palpitations.  Patient reports onset of symptoms at approximately 5:00 p.m. this afternoon while driving home.  Palpitation not accompanied by chest pain, nausea/emesis, diaphoresis, abdominal pain, headaches, visual changes.  Reports similar episode approximately 1.5 years ago assisting brief inpatient hospital stay. Was admitted at that time for approximately 1 day with negative stress test and echo.  Per outpatient cardiology notes, patient had negative Holter monitor evaluations and was counseled to reduce stress, as well as coffee and alcohol intake.  Patient reports he has been asymptomatic until today. He thinks that his presenting symptoms may have been precipitated by stress 2/2 his wife undergoing an orthopedic procedure, as well as lack of food and water while waiting for her surgery to finish. Patient takes baby aspirin at home, but is on no other anticoagulation. He reports that his cardiologist's deferred rate control therapy 2/2 his low resting heart rate of approximately 40-50 BPM.     Review of patient's allergies indicates:  No Known Allergies  Past Medical History:   Diagnosis Date    Acid reflux     Atrial flutter     Hyperlipidemia     Hypertension     Perennial allergic rhinitis with seasonal variation      No past surgical history on file.  Family History   Problem Relation Age of Onset    Parkinsonism Mother     Hypertension Mother     Ulcers Father     Arthritis Father     Diabetes Father     Hypertension Brother     Cancer Brother     Diabetes Brother     Anesthesia problems Neg Hx     Clotting disorder Neg Hx      Social History     Tobacco Use    Smoking status:  Never Smoker    Smokeless tobacco: Never Used   Substance Use Topics    Alcohol use: Yes     Comment: social    Drug use: No     Review of Systems   Constitutional: Negative for diaphoresis, fatigue and fever.   HENT: Negative for sore throat.    Respiratory: Negative for shortness of breath.    Cardiovascular: Positive for palpitations. Negative for chest pain and leg swelling.   Gastrointestinal: Negative for abdominal pain, nausea and vomiting.   Endocrine: Negative for cold intolerance and heat intolerance.   Genitourinary: Negative for dysuria and frequency.   Musculoskeletal: Negative for back pain.   Skin: Negative for rash.   Neurological: Negative for weakness.   Hematological: Does not bruise/bleed easily.       Physical Exam     Initial Vitals   BP Pulse Resp Temp SpO2   03/17/22 1855 03/17/22 1854 03/17/22 1854 03/17/22 1854 03/17/22 1854   (!) 158/96 (!) 112 16 98.7 °F (37.1 °C) 98 %      MAP       --                Physical Exam    Nursing note and vitals reviewed.  Constitutional: He appears well-developed and well-nourished. He is not diaphoretic. No distress.   HENT:   Head: Normocephalic and atraumatic.   Right Ear: External ear normal.   Left Ear: External ear normal.   Eyes: Conjunctivae and EOM are normal. Pupils are equal, round, and reactive to light.   Neck: Neck supple. No thyromegaly present.   Normal range of motion.  Cardiovascular:   Normal rhythm at 65-70 BP, regular rhythm.  Heart sounds normal without appreciable murmur, rub, gallop.  2+ radial/DP pulses bilaterally   Pulmonary/Chest: Breath sounds normal. No respiratory distress. He has no wheezes. He has no rhonchi. He has no rales.   Abdominal: Abdomen is soft. He exhibits no distension. There is no abdominal tenderness. There is no rebound and no guarding.   Musculoskeletal:      Cervical back: Normal range of motion and neck supple.     Neurological: He is alert and oriented to person, place, and time. He has normal strength.  No cranial nerve deficit. GCS score is 15. GCS eye subscore is 4. GCS verbal subscore is 5. GCS motor subscore is 6.   No appreciable upper extremity tremors   Skin: Skin is warm. Capillary refill takes less than 2 seconds. No rash noted.   Psychiatric: He has a normal mood and affect.         ED Course   Procedures  Labs Reviewed   CBC W/ AUTO DIFFERENTIAL - Abnormal; Notable for the following components:       Result Value    MCH 33.8 (*)     All other components within normal limits   COMPREHENSIVE METABOLIC PANEL - Abnormal; Notable for the following components:    Glucose 115 (*)     Total Bilirubin 1.3 (*)     Alkaline Phosphatase 53 (*)     ALT 53 (*)     All other components within normal limits   TROPONIN I   B-TYPE NATRIURETIC PEPTIDE     EKG Readings: (Independently Interpreted)   Initial Reading: No STEMI. Rhythm: Atrial Flutter. Heart Rate: 137. Ectopy: No Ectopy.   A flutter with 2-1 conduction     Other EKG Interpretations: Repeat EKG normal sinus rhythm  Sinus Olaf with sinus arrhythmia rate 57 no ischemic change       Imaging Results          X-Ray Chest AP Portable (Final result)  Result time 03/17/22 19:32:03    Final result by Adarsh Fenton MD (03/17/22 19:32:03)                 Narrative:    HISTORY: Chest Pain  palpitations.    FINDINGS: Portable chest radiograph at 1908 hours compared to 12/15/2020 shows the cardiomediastinal silhouette and pulmonary vasculature are within normal limits.    The lungs are normally expanded, with no consolidation, large pleural effusion, or evidence of pulmonary edema. No confluent infiltrates or pneumothorax. There are no significant osseous abnormalities.    IMPRESSION: No evidence of active cardiopulmonary disease.    Electronically signed by:  Adarsh Fenton MD  3/17/2022 7:32 PM CDT Workstation: [id number]                            X-Rays:   Independently Interpreted Readings:   Chest X-Ray: Normal heart size.  No infiltrates.  No acute abnormalities.      Medications   aspirin tablet 325 mg (has no administration in time range)     Medical Decision Making:   Independently Interpreted Test(s):   I have ordered and independently interpreted X-rays - see prior notes.  I have ordered and independently interpreted EKG Reading(s) - see prior notes  Clinical Tests:   Lab Tests: Ordered and Reviewed  The following lab test(s) were unremarkable: CBC, Troponin and BNP       <> Summary of Lab: Bili 1.3 alk-phos 53  Normal TSH  Alcohol level negative  Radiological Study: Ordered and Reviewed  Medical Tests: Ordered and Reviewed  ED Management:  62-year-old male with a previous history of intermittent atrial flutter presenting with sudden onset palpitations. On ED presentation, tachycardic 130s to 140s, mildly hypertensive; remainder vitals WNL.  Initial triage EKG with evidence of 2:1 atrial flutter, rate of 137 BPM.  Patient reports resolution of sensations of palpitations prior to receiving ED bed.  On initial evaluation, heart rate WNL, rhythm regular.  Physical exam benign.  Metabolic panel/magnesium/phosphorus, cardiac biomarkers, TSH WNL. [id number] score calculated at 1 today. Given the resolution of symptoms/normalization of heart rate without medical intervention, in setting of established outpatient cardiology care, felt comfortable with patient's discharge from the emergency department.  Will defer initiation of anticoagulation therapy to his cardiology physician given low stroke risk based on aforementioned scale.  Discuss strict return precautions with patient, as well as recommendations to follow-up with his cardiologist within the week if able.  Patient voiced understanding and agreement with plan of care as outlined above, all questions answered satisfactorily.  Subsequently discharged from emergency department stable condition.    Hari Arreola MD PGY-3  [facility]  11:21 PM         Attending Note:  I provided a face to face evaluation of this  patient.  I discussed the patient's care with the Resident.  I reviewed their note and agree with the history, physical, assessment, diagnosis, treatment, all procedures performed, xray and EKG interpretations and discharge plan provided by the Resident. My overall impression is paroxysmal a flutter patient had presented for palpitations in a flutter EKG revealed a flutter with 2-1 conduction.  Patient had no chest pain or shortness of breath lab work was benign negative troponin BNP repeat EKG sinus bradycardia with no ischemic changes patient has a cardiologist with she he will follow-up with.  The patient has been instructed to follow up with their physician or the one provided as well as specific return precautions.   Bridget Collins M.D. 3/17/2022 11:38 PM                        Clinical Impression:   Final diagnoses:  [R07.9] Chest pain                 Hari Arreola MD  Resident  03/17/22 [id number]       Bridget Collins MD  03/17/22 0924

## 2022-03-21 ENCOUNTER — TELEPHONE (OUTPATIENT)
Dept: CARDIOLOGY | Facility: CLINIC | Age: 62
End: 2022-03-21
Payer: OTHER GOVERNMENT

## 2022-03-28 ENCOUNTER — OFFICE VISIT (OUTPATIENT)
Dept: UROLOGY | Facility: CLINIC | Age: 62
End: 2022-03-28
Payer: OTHER GOVERNMENT

## 2022-03-28 VITALS
SYSTOLIC BLOOD PRESSURE: 158 MMHG | WEIGHT: 177 LBS | DIASTOLIC BLOOD PRESSURE: 89 MMHG | BODY MASS INDEX: 30.22 KG/M2 | HEART RATE: 56 BPM | HEIGHT: 64 IN

## 2022-03-28 DIAGNOSIS — Z80.42 FAMILY HISTORY OF PROSTATE CANCER: Primary | ICD-10-CM

## 2022-03-28 DIAGNOSIS — N52.8 OTHER MALE ERECTILE DYSFUNCTION: ICD-10-CM

## 2022-03-28 PROCEDURE — 99213 OFFICE O/P EST LOW 20 MIN: CPT | Mod: S$PBB,,, | Performed by: UROLOGY

## 2022-03-28 PROCEDURE — 99999 PR PBB SHADOW E&M-EST. PATIENT-LVL III: ICD-10-PCS | Mod: PBBFAC,,, | Performed by: UROLOGY

## 2022-03-28 PROCEDURE — 99999 PR PBB SHADOW E&M-EST. PATIENT-LVL III: CPT | Mod: PBBFAC,,, | Performed by: UROLOGY

## 2022-03-28 PROCEDURE — 99213 OFFICE O/P EST LOW 20 MIN: CPT | Mod: PBBFAC | Performed by: UROLOGY

## 2022-03-28 PROCEDURE — 99213 PR OFFICE/OUTPT VISIT, EST, LEVL III, 20-29 MIN: ICD-10-PCS | Mod: S$PBB,,, | Performed by: UROLOGY

## 2022-03-28 NOTE — PROGRESS NOTES
CHIEF COMPLAINT:    Mr. Smith is a 62 y.o. male presenting for his annual exam    PRESENTING ILLNESS:    Patrice Smith is a 62 y.o. male follow up on family history of prostate cancer and ED.  He states that he has not had any new diagnoses or surgeries since his last visit on 12/7/2020.  He continues to use the cialis 20 mg on occasion.  He states he does not need a refill at this point.    No urinary complaints.  PSA was checked by his primary.  Noted from [facility]    REVIEW OF SYSTEMS:    Review of Systems   Constitutional: Negative.    HENT: Negative.    Eyes: Negative.    Respiratory: Negative.    Cardiovascular: Negative.    Gastrointestinal: Negative.    Genitourinary: Negative.    Musculoskeletal: Negative.    Skin: Negative.    Neurological: Negative.    Endo/Heme/Allergies: Negative.    Psychiatric/Behavioral: Negative.        PATIENT HISTORY:    Past Medical History:   Diagnosis Date    Acid reflux     Atrial flutter     Hyperlipidemia     Hypertension     Perennial allergic rhinitis with seasonal variation        No past surgical history on file.    Family History   Problem Relation Age of Onset    Parkinsonism Mother     Hypertension Mother     Ulcers Father     Arthritis Father     Diabetes Father     Hypertension Brother     Cancer Brother     Diabetes Brother        Social History     Socioeconomic History    Marital status:    Occupational History    Occupation: [employer]    Tobacco Use    Smoking status: Never Smoker    Smokeless tobacco: Never Used   Substance and Sexual Activity    Alcohol use: Yes     Comment: social    Drug use: No     Social Determinants of Health     Financial Resource Strain: Low Risk     Difficulty of Paying Living Expenses: Not hard at all   Food Insecurity: No Food Insecurity    Worried About Running Out of Food in the Last Year: Never true    Ran Out of Food in the Last Year: Never true   Transportation Needs: No Transportation Needs     Lack of Transportation (Medical): No    Lack of Transportation (Non-Medical): No   Physical Activity: Sufficiently Active    Days of Exercise per Week: 5 days    Minutes of Exercise per Session: 30 min   Stress: No Stress Concern Present    Feeling of Stress : Not at all   Social Connections: Unknown    Frequency of Communication with Friends and Family: Once a week    Frequency of Social Gatherings with Friends and Family: Twice a week    Active Member of Clubs or Organizations: No    Attends Club or Organization Meetings: Never    Marital Status:    Housing Stability: Low Risk     Unable to Pay for Housing in the Last Year: No    Number of Places Lived in the Last Year: 1    Unstable Housing in the Last Year: No       Allergies:  Patient has no known allergies.    Medications:  Outpatient Encounter Medications as of 3/28/2022   Medication Sig Dispense Refill    fluticasone propionate (FLONASE) 50 mcg/actuation nasal spray USE 1 SPRAY IN EACH NOSTRIL TWICE A DAY AS NEEDED 48 g 11    irbesartan (AVAPRO) 300 MG tablet Take 1 tablet (300 mg total) by mouth every evening. 90 tablet 3    multivitamin capsule Take 1 capsule by mouth once daily.      pantoprazole (PROTONIX) 40 MG tablet TAKE 1 TABLET DAILY 90 tablet 3    rosuvastatin (CRESTOR) 10 MG tablet Take 1 tablet (10 mg total) by mouth every evening. 90 tablet 1    tadalafil (CIALIS) 20 MG Tab Take 1 tablet (20 mg total) by mouth twice a week. As needed 30 tablet 3    vitamin A-vit C-vit E-zinc-Cu Tab Take by mouth once daily.       aspirin 81 MG Chew Take 1 tablet (81 mg total) by mouth once daily.  0    flu vac qs 21-22,6ms up,jamie/PF (FLUCELVAX QUAD 5195-7879, PF, IM)       fluorouraciL (EFUDEX) 5 % cream       hydrocortisone 2.5 % cream Apply topically 2 (two) times daily. 15 g 1    varicella-zoster gE-AS01B, PF, (SHINGRIX, PF,) 50 mcg/0.5 mL injection        No facility-administered encounter medications on file as of  3/28/2022.         PHYSICAL EXAMINATION:    The patient generally appears in good health, is appropriately interactive, and is in no apparent distress.    Skin: No lesions.    Mental: Cooperative with normal affect.    Neuro: Grossly intact.    HEENT: Normal. No evidence of lymphadenopathy.    Chest:  normal inspiratory effort.    Abdomen: Soft, non-tender. No masses or organomegaly. Bladder is not palpable. No evidence of flank discomfort. No evidence of inguinal hernia.    Extremities: No clubbing, cyanosis, or edema    Scrotum showed no rashes or lesions. Testicles showed no masses or tenderness.  Epididymis showed no masses or tenderness.  Penis was circumcised. No meatal stenosis. No penile discharge.  No inguinal hernias.  No inguinal lymphadenopathy.    CRISTÓBAL:  30 g prostate. Median sulcus preserved.  Normal rectal tone.  No anal masses.    LABS:    Lab Results   Component Value Date    BUN 16 03/17/2022    CREATININE 1.1 03/17/2022     Lab Results   Component Value Date    PSA 1.6 02/06/2014    PSA 1.72 11/19/2012    PSA 1.4 01/06/2011    PSADIAG 1.2 09/28/2018    PSADIAG 1.2 09/21/2017     PSA at [facility] 1.5 2/4/2022   1.6 1 year ago  1.6 2 years ago     UA 1.000, pH 7, otherwise, negative    IMPRESSION:    Encounter Diagnoses   Name Primary?    Family history of prostate cancer Yes    Other male erectile dysfunction        PLAN:    1.  Follow up in 1 year.

## 2022-03-29 ENCOUNTER — OFFICE VISIT (OUTPATIENT)
Dept: CARDIOLOGY | Facility: CLINIC | Age: 62
End: 2022-03-29
Payer: OTHER GOVERNMENT

## 2022-03-29 VITALS
RESPIRATION RATE: 16 BRPM | HEART RATE: 61 BPM | DIASTOLIC BLOOD PRESSURE: 72 MMHG | BODY MASS INDEX: 29.19 KG/M2 | HEIGHT: 64 IN | WEIGHT: 171 LBS | OXYGEN SATURATION: 98 % | SYSTOLIC BLOOD PRESSURE: 126 MMHG

## 2022-03-29 DIAGNOSIS — I10 ESSENTIAL HYPERTENSION: ICD-10-CM

## 2022-03-29 DIAGNOSIS — E78.2 MULTIPLE-TYPE HYPERLIPIDEMIA: ICD-10-CM

## 2022-03-29 DIAGNOSIS — I48.4 ATYPICAL ATRIAL FLUTTER: ICD-10-CM

## 2022-03-29 PROCEDURE — 99214 PR OFFICE/OUTPT VISIT, EST, LEVL IV, 30-39 MIN: ICD-10-PCS | Mod: S$GLB,,,

## 2022-03-29 PROCEDURE — 99214 OFFICE O/P EST MOD 30 MIN: CPT | Mod: S$GLB,,,

## 2022-03-29 NOTE — PROGRESS NOTES
Subjective:    Patient ID:  Patrice Smith is a 62 y.o. male patient here for evaluation Hyperlipidemia and Hypertension      History of Present Illness:   Patient is here today for a ER follow up. He went due to moderate palpitations and reportedly was found to be in atrial flutter. He was sat out in the waiting room for observation and within two hours it resolved on it's own. However, the only EKG that was uploaded from the ED visit was sinus bradycardia. He states this happened one other times several years ago and it was severe palpitations. Then he had just gotten out of bed and was feeding his dogs when he suddenly started to feel palpitations. He came to the ED and was given a medication in his IV and it resolved. This time he was waiting on his wife to get out of surgery and had not eaten or drank all day and was stressed, when he began to feel them . He is very active, runs on his treadmill 5x week with no palpitations, SOB, dizziness, syncope, or chest pains. His resting heart rate is low 50-60s, preventing us in the past to start him on any medications to prevent recurrence.     Of note in 2020, he had a stress test which were both negative for ischemia and an echocardiogram which was unremarkable with normal EF. He has also completed a 7 day, 24 hour, and 30 day holter monitor which did not catch any a flutter or fib. He also had a normal angiogram about 8 year ago.       ED Management:  62-year-old male with a previous history of intermittent atrial flutter presenting with sudden onset palpitations. On ED presentation, tachycardic 130s to 140s, mildly hypertensive; remainder vitals WNL.  Initial triage EKG with evidence of 2:1 atrial flutter, rate of 137 BPM.  Patient reports resolution of sensations of palpitations prior to receiving ED bed.  On initial evaluation, heart rate WNL, rhythm regular.  Physical exam benign.  Metabolic panel/magnesium/phosphorus, cardiac biomarkers, TSH WNL. [id number]  score calculated at 1 today. Given the resolution of symptoms/normalization of heart rate without medical intervention, in setting of established outpatient cardiology care, felt comfortable with patient's discharge from the emergency department.  Will defer initiation of anticoagulation therapy to his cardiology physician given low stroke risk based on aforementioned scale.  Discuss strict return precautions with patient, as well as recommendations to follow-up with his cardiologist within the week if able.  Patient voiced understanding and agreement with plan of care as outlined above, all questions answered satisfactorily.  Subsequently discharged from emergency department stable condition.    Review of patient's allergies indicates:  No Known Allergies    Past Medical History:   Diagnosis Date    Acid reflux     Atrial flutter     Hyperlipidemia     Hypertension     Perennial allergic rhinitis with seasonal variation      History reviewed. No pertinent surgical history.  Social History     Tobacco Use    Smoking status: Never Smoker    Smokeless tobacco: Never Used   Substance Use Topics    Alcohol use: Yes     Comment: social    Drug use: No        Review of Systems:    As noted in HPI in addition      REVIEW OF SYSTEMS  CARDIOVASCULAR: No recent chest pain, palpitations, arm, neck, or jaw pain  RESPIRATORY: No recent fever, cough chills, SOB or congestion  : No blood in the urine  GI: No Nausea, vomiting, constipation, diarrhea, blood, or reflux.  MUSCULOSKELETAL: No myalgias  NEURO: No lightheadedness or dizziness  EYES: No Double vision, blurry, vision or headache          Objective        Vitals:    03/29/22 1422   BP: 126/72   Pulse: 61   Resp: 16       LIPIDS - LAST 2   Lab Results   Component Value Date    CHOL 120 02/25/2022    CHOL 124 12/16/2020    HDL 53 02/25/2022    HDL 54 12/16/2020    LDLCALC 48 02/25/2022    LDLCALC 46.0 (L) 12/16/2020    TRIG 106 02/25/2022    TRIG 120 12/16/2020     CHOLHDL 43.5 12/16/2020    CHOLHDL 2.0 09/28/2018       CBC - LAST 2  Lab Results   Component Value Date    WBC 6.76 03/17/2022    WBC 3.7 03/25/2021    RBC 5.09 03/17/2022    RBC 4.60 03/25/2021    HGB 17.2 03/17/2022    HGB 16.0 03/25/2021    HCT 48.1 03/17/2022    HCT 46.1 03/25/2021    MCV 95 03/17/2022     (H) 03/25/2021    MCH 33.8 (H) 03/17/2022    MCH 34.8 (H) 03/25/2021    MCHC 35.8 03/17/2022    MCHC 34.7 03/25/2021    RDW 11.9 03/17/2022    RDW 12.8 03/25/2021     03/17/2022     03/25/2021    MPV 10.8 03/17/2022    MPV 10.8 12/16/2020    GRAN 4.7 03/17/2022    GRAN 69.8 03/17/2022    LYMPH 1.4 03/17/2022    LYMPH 21.0 03/17/2022    MONO 0.4 03/17/2022    MONO 6.5 03/17/2022    BASO 0.03 03/17/2022    BASO 0.0 03/25/2021    NRBC 0 03/17/2022    NRBC 0 12/16/2020       CHEMISTRY & LIVER FUNCTION - LAST 2  Lab Results   Component Value Date     03/17/2022     02/25/2022    K 3.5 03/17/2022    K 4.4 02/25/2022     03/17/2022     02/25/2022    CO2 24 03/17/2022    CO2 21 02/25/2022    ANIONGAP 10 03/17/2022    ANIONGAP 9 12/16/2020    BUN 16 03/17/2022    BUN 16 02/25/2022    CREATININE 1.1 03/17/2022    CREATININE 0.95 02/25/2022     (H) 03/17/2022     (H) 02/25/2022    CALCIUM 9.8 03/17/2022    CALCIUM 9.3 02/25/2022    MG 2.1 03/17/2022    MG 2.1 12/16/2020    ALBUMIN 5.0 03/17/2022    ALBUMIN 4.9 (H) 02/25/2022    PROT 7.5 03/17/2022    PROT 6.6 12/16/2020    ALKPHOS 53 (L) 03/17/2022    ALKPHOS 44 (L) 12/16/2020    ALT 53 (H) 03/17/2022    ALT 72 (H) 02/25/2022    AST 28 03/17/2022    AST 47 (H) 02/25/2022    BILITOT 1.3 (H) 03/17/2022    BILITOT 0.7 02/25/2022        CARDIAC PROFILE - LAST 2  Lab Results   Component Value Date    BNP 25 03/17/2022    TROPONINI <0.030 03/17/2022    TROPONINI <0.030 12/15/2020        COAGULATION - LAST 2  No results found for: LABPT, INR, APTT    ENDOCRINE & PSA - LAST 2  Lab Results   Component Value Date     HGBA1C 5.8 (H) 02/25/2022    HGBA1C 5.8 12/15/2020    MICROALBUR <3.0 04/02/2019    MICROALBUR <0.2 09/28/2018    TSH 3.490 03/17/2022    TSH 4.610 12/15/2020    PSA 1.6 02/06/2014    PSA 1.72 11/19/2012        ECHOCARDIOGRAM RESULTS  Results for orders placed during the hospital encounter of 12/15/20    Echo Color Flow Doppler? Yes    Interpretation Summary  · Normal central venous pressure (3 mmHg).  · The estimated PA systolic pressure is 25 mmHg.  · The left ventricle is normal in size with hyperdynamic systolic function. There is left ventricular concentric remodeling. The estimated ejection fraction is 80%.  · Normal left ventricular diastolic function.  · Normal right ventricular size with normal right ventricular systolic function.      CURRENT/PREVIOUS VISIT EKG  Results for orders placed or performed during the hospital encounter of 03/17/22   Repeat EKG 12-lead    Collection Time: 03/17/22 10:00 PM    Narrative    Test Reason : R00.2,    Vent. Rate : 055 BPM     Atrial Rate : 055 BPM     P-R Int : 172 ms          QRS Dur : 102 ms      QT Int : 426 ms       P-R-T Axes : 050 029 025 degrees     QTc Int : 407 ms    Sinus bradycardia with marked sinus arrythmia  Otherwise normal ECG  When compared with ECG of 17-MAR-2022 19:12,  AZ interval has increased  Vent. rate has decreased BY  82 BPM  ST no longer depressed in Inferior leads  ST no longer depressed in Anterior-lateral leads  Nonspecific T wave abnormality no longer evident in Lateral leads  Confirmed by Jude Max MD ([id number]) on 3/20/2022 6:25:15 PM    Referred By: CATHY   SELF           Confirmed By:Jude Max MD     No valid procedures specified.   Results for orders placed during the hospital encounter of 12/15/20    Treadmill Stress Test    Interpretation Summary    The EKG portion of this study is negative for ischemia.    The patient reported no chest pain during the stress test.    There were no arrhythmias during stress.    ECG  Stress Nuclear portion of this study will be reported separately.    No valid procedures specified.    PHYSICAL EXAM  CONSTITUTIONAL: Well built, well nourished in no apparent distress  NECK: no carotid bruit, no JVD  LUNGS: CTA  CHEST WALL: no tenderness  HEART: regular rate and rhythm, S1, S2 normal, no murmur, click, rub or gallop   ABDOMEN: soft, non-tender; bowel sounds normal; no masses,  no organomegaly  EXTREMITIES: Extremities normal, no edema, no calf tenderness noted  NEURO: AAO X 3    I HAVE REVIEWED :    The vital signs, nurses notes, and all the pertinent radiology and labs.      Current Outpatient Medications   Medication Instructions    aspirin 81 mg, Oral, Daily    flu vac qs 21-22,6ms up,jamie/PF (FLUCELVAX QUAD 0404-8053, PF, IM) No dose, route, or frequency recorded.    fluticasone propionate (FLONASE) 50 mcg/actuation nasal spray USE 1 SPRAY IN EACH NOSTRIL TWICE A DAY AS NEEDED    irbesartan (AVAPRO) 300 mg, Oral, Nightly    multivitamin capsule 1 capsule, Oral, Daily,      pantoprazole (PROTONIX) 40 MG tablet TAKE 1 TABLET DAILY    rosuvastatin (CRESTOR) 10 mg, Oral, Nightly    tadalafiL (CIALIS) 20 mg, Oral, Twice weekly, As needed    varicella-zoster gE-AS01B, PF, (SHINGRIX, PF,) 50 mcg/0.5 mL injection No dose, route, or frequency recorded.    vitamin A-vit C-vit E-zinc-Cu Tab Oral, Daily          Assessment & Plan     Essential hypertension  BP good today 126/72   Cont irbesartan 300 mg   Low Na diet     Continue diet and exercise     Atrial flutter  Very symptomatic with both occasions   He would like to be sent to Dr. Martinez to discuss ablation vs PPM placement for medication control of recurrence     CHADs is 1, remain on baby ASA daily. NO bleeding tendencies noted     Multiple-type hyperlipidemia  Last LDL 48   Cont crestor 10 mg nightly   Low fat low cholesterol diet           Keep follow up with Dr. Weston in July.

## 2022-03-29 NOTE — ASSESSMENT & PLAN NOTE
Very symptomatic with both occasions   He would like to be sent to Dr. Martinez to discuss ablation vs PPM placement for medication control of recurrence     CHADs is 1, remain on baby ASA daily. NO bleeding tendencies noted

## 2022-04-11 ENCOUNTER — IMMUNIZATION (OUTPATIENT)
Dept: PRIMARY CARE CLINIC | Facility: CLINIC | Age: 62
End: 2022-04-11
Payer: OTHER GOVERNMENT

## 2022-04-11 DIAGNOSIS — Z23 NEED FOR VACCINATION: Primary | ICD-10-CM

## 2022-04-11 PROCEDURE — 91305 COVID-19, MRNA, LNP-S, PF, 30 MCG/0.3 ML DOSE VACCINE (PFIZER): ICD-10-PCS | Mod: S$GLB,,, | Performed by: FAMILY MEDICINE

## 2022-04-11 PROCEDURE — 0054A COVID-19, MRNA, LNP-S, PF, 30 MCG/0.3 ML DOSE VACCINE (PFIZER): ICD-10-PCS | Mod: S$GLB,,, | Performed by: FAMILY MEDICINE

## 2022-04-11 PROCEDURE — 91305 COVID-19, MRNA, LNP-S, PF, 30 MCG/0.3 ML DOSE VACCINE (PFIZER): CPT | Mod: S$GLB,,, | Performed by: FAMILY MEDICINE

## 2022-04-11 PROCEDURE — 0054A COVID-19, MRNA, LNP-S, PF, 30 MCG/0.3 ML DOSE VACCINE (PFIZER): CPT | Mod: S$GLB,,, | Performed by: FAMILY MEDICINE

## 2022-04-25 NOTE — PROGRESS NOTES
Subjective:     HPI    I had the pleasure of seeing Patrice Smith in consultation at your request for the evaluation of atrial flutter. He is a 62M with HTN, borderline HLD, BRICE on CPAP, whose history of atrial flutter dates back to 12/2020 when he presented to [facility] with typical flutter with RVR. He was rate controlled, spontaneously converted to sinus rhythm, and was discharged. He had recurrent atrial flutter in 3/2022 following a stressful day (not in the EMR but the ED doc said this was the rhythm he was in). A follow-up ECG in the EMR shows sinus rhythm. He is not on anticoagulation or rate control agents. He presents to me to discuss management options.    Echo in 12/2020 showed a structurally normal heart, normal EF. An exercise SPECT done in 12/2020 was negative for ischemia.    My interpretation of today's ECG is sinus bradycardia at 52 bpm.    Review of Systems   Constitutional: Negative for decreased appetite, malaise/fatigue, weight gain and weight loss.   HENT: Negative for sore throat.    Eyes: Negative for blurred vision.   Cardiovascular: Positive for palpitations. Negative for chest pain, dyspnea on exertion, irregular heartbeat, leg swelling, near-syncope, orthopnea, paroxysmal nocturnal dyspnea and syncope.   Respiratory: Negative for shortness of breath.    Skin: Negative for rash.   Musculoskeletal: Negative for arthritis.   Gastrointestinal: Negative for abdominal pain.   Neurological: Negative for focal weakness.   Psychiatric/Behavioral: Negative for altered mental status.        Objective:    Physical Exam  Vitals and nursing note reviewed.   Constitutional:       General: He is not in acute distress.     Appearance: He is well-developed.   HENT:      Head: Normocephalic and atraumatic.   Eyes:      General: No scleral icterus.     Pupils: Pupils are equal, round, and reactive to light.   Neck:      Thyroid: No thyromegaly.   Cardiovascular:      Rate and Rhythm: Regular rhythm.       Pulses: Normal pulses.      Heart sounds: Normal heart sounds. No murmur heard.    No friction rub. No gallop.   Pulmonary:      Effort: Pulmonary effort is normal.      Breath sounds: Normal breath sounds.   Abdominal:      General: Bowel sounds are normal. There is no distension.      Palpations: Abdomen is soft.      Tenderness: There is no abdominal tenderness.   Musculoskeletal:      Cervical back: Neck supple.   Skin:     General: Skin is warm and dry.      Findings: No rash.   Neurological:      Mental Status: He is alert and oriented to person, place, and time.   Psychiatric:         Behavior: Behavior normal.           Assessment:       1. Atrial flutter, unspecified type    2. Palpitation    3. Essential hypertension    4. Multiple-type hyperlipidemia    5. Obstructive sleep apnea syndrome         Plan:       In summary, Patrice Smith is a 62M with a history of typical atrial flutter. His [id number] Score is 1 (HTN) so for now it is reasonable to defer anticoagulation.    We discussed in detail the pathophysiology of atrial flutter as well as treatment options including antiarrhythmics and catheter ablation. We specifically discussed the risks, benefits, indications, and alternatives to CTI-line. After considering his options he has decided to proceed.    CARTO. LETA--cancel if sinus.    Thank you for allowing me to participate in the care of this patient. Please do not hesitate to call me with any questions or concerns.

## 2022-04-27 ENCOUNTER — OFFICE VISIT (OUTPATIENT)
Dept: CARDIOLOGY | Facility: CLINIC | Age: 62
End: 2022-04-27
Payer: OTHER GOVERNMENT

## 2022-04-27 VITALS
BODY MASS INDEX: 30.39 KG/M2 | HEART RATE: 62 BPM | OXYGEN SATURATION: 99 % | DIASTOLIC BLOOD PRESSURE: 78 MMHG | RESPIRATION RATE: 16 BRPM | SYSTOLIC BLOOD PRESSURE: 128 MMHG | WEIGHT: 178 LBS | HEIGHT: 64 IN

## 2022-04-27 DIAGNOSIS — I10 ESSENTIAL HYPERTENSION: ICD-10-CM

## 2022-04-27 DIAGNOSIS — I48.92 ATRIAL FLUTTER, UNSPECIFIED TYPE: Primary | ICD-10-CM

## 2022-04-27 DIAGNOSIS — G47.33 OBSTRUCTIVE SLEEP APNEA SYNDROME: ICD-10-CM

## 2022-04-27 DIAGNOSIS — E78.2 MULTIPLE-TYPE HYPERLIPIDEMIA: ICD-10-CM

## 2022-04-27 DIAGNOSIS — R00.2 PALPITATION: ICD-10-CM

## 2022-04-27 PROCEDURE — 99205 PR OFFICE/OUTPT VISIT, NEW, LEVL V, 60-74 MIN: ICD-10-PCS | Mod: S$GLB,,, | Performed by: INTERNAL MEDICINE

## 2022-04-27 PROCEDURE — 99205 OFFICE O/P NEW HI 60 MIN: CPT | Mod: S$GLB,,, | Performed by: INTERNAL MEDICINE

## 2022-04-27 PROCEDURE — 93010 ELECTROCARDIOGRAM REPORT: CPT | Mod: S$GLB,,, | Performed by: INTERNAL MEDICINE

## 2022-04-27 PROCEDURE — 93010 EKG 12-LEAD: ICD-10-PCS | Mod: S$GLB,,, | Performed by: INTERNAL MEDICINE

## 2022-04-27 PROCEDURE — 93005 EKG 12-LEAD: ICD-10-PCS | Mod: S$GLB,,, | Performed by: INTERNAL MEDICINE

## 2022-04-27 PROCEDURE — 93005 ELECTROCARDIOGRAM TRACING: CPT | Mod: S$GLB,,, | Performed by: INTERNAL MEDICINE

## 2022-04-29 ENCOUNTER — PATIENT MESSAGE (OUTPATIENT)
Dept: CARDIOLOGY | Facility: CLINIC | Age: 62
End: 2022-04-29
Payer: OTHER GOVERNMENT

## 2022-05-03 DIAGNOSIS — Z01.818 PRE-OP TESTING: ICD-10-CM

## 2022-05-03 DIAGNOSIS — I48.3 TYPICAL ATRIAL FLUTTER: Primary | ICD-10-CM

## 2022-05-04 ENCOUNTER — PATIENT MESSAGE (OUTPATIENT)
Dept: ELECTROPHYSIOLOGY | Facility: CLINIC | Age: 62
End: 2022-05-04
Payer: OTHER GOVERNMENT

## 2022-05-04 DIAGNOSIS — Z01.818 PRE-OP TESTING: ICD-10-CM

## 2022-05-04 DIAGNOSIS — I48.3 TYPICAL ATRIAL FLUTTER: Primary | ICD-10-CM

## 2022-05-05 DIAGNOSIS — E78.2 MULTIPLE-TYPE HYPERLIPIDEMIA: ICD-10-CM

## 2022-05-05 RX ORDER — ROSUVASTATIN CALCIUM 10 MG/1
10 TABLET, COATED ORAL NIGHTLY
Qty: 90 TABLET | Refills: 1 | Status: SHIPPED | OUTPATIENT
Start: 2022-05-05

## 2022-06-06 ENCOUNTER — TELEPHONE (OUTPATIENT)
Dept: ELECTROPHYSIOLOGY | Facility: CLINIC | Age: 62
End: 2022-06-06
Payer: OTHER GOVERNMENT

## 2022-06-06 NOTE — TELEPHONE ENCOUNTER
Spoke to Mr Smith    CONFIRMED procedure arrival time on 6/07/2022 at 8am    Reiterated instructions including:  -Directions to check in desk  -NPO after midnight night prior to procedure  -Pre-procedure LABS Completed and confirmed  -COVID vaccines documented  -Confirmed no fever, cough, or shortness of breath in the past 30 days  -Reviewed current visitor policy    Patient verbalizes understanding of above and appreciates call.

## 2022-06-06 NOTE — H&P
Electrophysiology Pre Procedure H&P  Reason for visit: Elective catheter ablation     HPI:   Patrice Smith is a 62 y.o. male with  HTN, borderline HLD, BRICE on CPAP, whose history of atrial flutter dates back to 12/2020 when he presented to [facility] with typical flutter with RVR. He was rate controlled, spontaneously converted to sinus rhythm, and was discharged. He had recurrent symptomatic atrial flutter in 3/2022 following a stressful day that spontaneously resolved. He was seen in clinic and opted for catheter ablation.  Echo in 12/2020 showed a structurally normal heart, normal EF. An exercise SPECT done in 12/2020 was negative for ischemia. He is clinically stable today. Procedure for CTI ablation was explained to him in detail and he expressed understanding and consented to the planned procedure. He is accompanied by his wife. He is not on oral anticoagulation.      My interpretation of today's ECG is sinus bradycardia at 54 bpm.      Past Medical History:   Diagnosis Date    Acid reflux     Atrial flutter     Hyperlipidemia     Hypertension     Perennial allergic rhinitis with seasonal variation         Social History     Socioeconomic History    Marital status:    Occupational History    Occupation: [employer].    Tobacco Use    Smoking status: Never Smoker    Smokeless tobacco: Never Used   Substance and Sexual Activity    Alcohol use: Yes     Comment: social    Drug use: No     Social Determinants of Health     Financial Resource Strain: Low Risk     Difficulty of Paying Living Expenses: Not hard at all   Food Insecurity: No Food Insecurity    Worried About Running Out of Food in the Last Year: Never true    Ran Out of Food in the Last Year: Never true   Transportation Needs: No Transportation Needs    Lack of Transportation (Medical): No    Lack of Transportation (Non-Medical): No   Physical Activity: Sufficiently Active    Days of Exercise per Week: 5 days    Minutes of Exercise  per Session: 30 min   Stress: No Stress Concern Present    Feeling of Stress : Not at all   Social Connections: Unknown    Frequency of Communication with Friends and Family: Once a week    Frequency of Social Gatherings with Friends and Family: Twice a week    Active Member of Clubs or Organizations: No    Attends Club or Organization Meetings: Never    Marital Status:    Housing Stability: Low Risk     Unable to Pay for Housing in the Last Year: No    Number of Places Lived in the Last Year: 1    Unstable Housing in the Last Year: No        Family History   Problem Relation Age of Onset    Parkinsonism Mother     Hypertension Mother     Ulcers Father     Arthritis Father     Diabetes Father     Hypertension Brother     Cancer Brother     Diabetes Brother     Anesthesia problems Neg Hx     Clotting disorder Neg Hx         No current facility-administered medications for this encounter.     Current Outpatient Medications   Medication Sig Dispense Refill    aspirin 81 MG Chew Take 1 tablet (81 mg total) by mouth once daily.  0    fluticasone propionate (FLONASE) 50 mcg/actuation nasal spray USE 1 SPRAY IN EACH NOSTRIL TWICE A DAY AS NEEDED 48 g 11    irbesartan (AVAPRO) 300 MG tablet Take 1 tablet (300 mg total) by mouth every evening. 90 tablet 3    multivitamin capsule Take 1 capsule by mouth once daily.      pantoprazole (PROTONIX) 40 MG tablet TAKE 1 TABLET DAILY 90 tablet 3    rosuvastatin (CRESTOR) 10 MG tablet Take 1 tablet (10 mg total) by mouth every evening. 90 tablet 1    tadalafil (CIALIS) 20 MG Tab Take 1 tablet (20 mg total) by mouth twice a week. As needed 30 tablet 3    varicella-zoster gE-AS01B, PF, (SHINGRIX, PF,) 50 mcg/0.5 mL injection       vitamin A-vit C-vit E-zinc-Cu Tab Take by mouth once daily.         ROS:  Constitutional: (-)fevers, (-)chills, (-)night sweats  HEENT: (-) headaches (-) lightheadedness (-) blurry vision  Cardiovascular: (-)chest pain  (-)paroxysmal nocturnal dyspnea (-)orthopnea  Respiratory: (-)shortness of breath, (-)dyspnea on exertion (-)hemoptysis  Gastrointestinal: (-)abdominal pain (-)nausea (-)vomiting (-)tarry stools  Musculoskeletal: (-)arthralgias (-)limited range of motion  Neurologic: (-)parasthesias (-)mood disorder (-)anxiety  Endo: (-)polyuria (-)polydipsia (-)heat/cold intolerance  Skin: (-)rash     Physical Exam:   Wt Readings from Last 3 Encounters:   06/07/22 76.2 kg (168 lb)   04/27/22 80.7 kg (178 lb)   03/29/22 77.6 kg (171 lb)     Temp Readings from Last 3 Encounters:   06/07/22 99.3 °F (37.4 °C) (Temporal)   03/17/22 98.7 °F (37.1 °C)   03/10/22 98.3 °F (36.8 °C) (Oral)     BP Readings from Last 3 Encounters:   06/07/22 (!) 154/83   04/27/22 128/78   03/29/22 126/72     Pulse Readings from Last 3 Encounters:   06/07/22 (!) 57   04/27/22 62   03/29/22 61       Gen: no acute distress, pleasant patient answering questions appropriately  HEENT: extra-ocular muscles intact, normocephalic-atraumatic  Neck: no jugular venous distension, no lymphadenopathy, no thyromegaly, no carotid bruits  CVS: regular rate and rhythm, S1S2 normal, no murmurs/rubs/gallops  CHEST: clear to auscultation bilaterally, no wheezes/rales/ronchi  ABD: Soft, non-tender, nondistended, bowel sounds present  EXT: No Edema, 2+ pulses bilaterally   NEURO: awake, alert, and oriented   Skin: No rash     Review of Labs:   Lab Results   Component Value Date    WBC 6.76 03/17/2022    HGB 17.2 03/17/2022    HCT 48.1 03/17/2022    MCV 95 03/17/2022     03/17/2022       CMP  Sodium   Date Value Ref Range Status   03/17/2022 139 136 - 145 mmol/L Final     Potassium   Date Value Ref Range Status   03/17/2022 3.5 3.5 - 5.1 mmol/L Final     Chloride   Date Value Ref Range Status   03/17/2022 105 95 - 110 mmol/L Final     CO2   Date Value Ref Range Status   03/17/2022 24 23 - 29 mmol/L Final     Glucose   Date Value Ref Range Status   03/17/2022 115 (H) 70 - 110  mg/dL Final     BUN   Date Value Ref Range Status   03/17/2022 16 8 - 23 mg/dL Final     Creatinine   Date Value Ref Range Status   03/17/2022 1.1 0.5 - 1.4 mg/dL Final     Calcium   Date Value Ref Range Status   03/17/2022 9.8 8.7 - 10.5 mg/dL Final     Total Protein   Date Value Ref Range Status   03/17/2022 7.5 6.0 - 8.4 g/dL Final     Albumin   Date Value Ref Range Status   03/17/2022 5.0 3.5 - 5.2 g/dL Final     Total Bilirubin   Date Value Ref Range Status   03/17/2022 1.3 (H) 0.1 - 1.0 mg/dL Final     Comment:     For infants and newborns, interpretation of results should be based  on gestational age, weight and in agreement with clinical  observations.    Premature Infant recommended reference ranges:  Up to 24 hours.............<8.0 mg/dL  Up to 48 hours............<12.0 mg/dL  3-5 days..................<15.0 mg/dL  6-29 days.................<15.0 mg/dL       Alkaline Phosphatase   Date Value Ref Range Status   03/17/2022 53 (L) 55 - 135 U/L Final     AST   Date Value Ref Range Status   03/17/2022 28 10 - 40 U/L Final     ALT   Date Value Ref Range Status   03/17/2022 53 (H) 10 - 44 U/L Final     Anion Gap   Date Value Ref Range Status   03/17/2022 10 8 - 16 mmol/L Final     eGFR if    Date Value Ref Range Status   03/17/2022 >60.0 >60 mL/min/1.73 m^2 Final     eGFR if non    Date Value Ref Range Status   03/17/2022 >60.0 >60 mL/min/1.73 m^2 Final     Comment:     Calculation used to obtain the estimated glomerular filtration  rate (eGFR) is the CKD-EPI equation.        Results for orders placed during the hospital encounter of 12/15/20    Echo Color Flow Doppler? Yes    Interpretation Summary  · Normal central venous pressure (3 mmHg).  · The estimated PA systolic pressure is 25 mmHg.  · The left ventricle is normal in size with hyperdynamic systolic function. There is left ventricular concentric remodeling. The estimated ejection fraction is 80%.  · Normal left ventricular  diastolic function.  · Normal right ventricular size with normal right ventricular systolic function.    Most recent ECG  Sinus rbradycardia 54 bpm, normal intervals     Summary:  Patrice Smith is a 62 y.o. male with a history of typical atrial flutter. His [id number] Score is 1 (HTN) and currently not on anticoagulation. He is here today for elective catheter ablation. We discussed procedure involved in catheter ablation. We specifically discussed the risks, benefits, indications, and alternatives to CTI-line. After considering his options he has decided to proceed. All questions answered.     Plan:  Proceed to the planned catheter ablation.

## 2022-06-07 ENCOUNTER — ANESTHESIA EVENT (OUTPATIENT)
Dept: MEDSURG UNIT | Facility: HOSPITAL | Age: 62
End: 2022-06-07
Payer: OTHER GOVERNMENT

## 2022-06-07 ENCOUNTER — TELEPHONE (OUTPATIENT)
Dept: CARDIOLOGY | Facility: HOSPITAL | Age: 62
End: 2022-06-07
Payer: OTHER GOVERNMENT

## 2022-06-07 ENCOUNTER — ANESTHESIA (OUTPATIENT)
Dept: MEDSURG UNIT | Facility: HOSPITAL | Age: 62
End: 2022-06-07
Payer: OTHER GOVERNMENT

## 2022-06-07 ENCOUNTER — HOSPITAL ENCOUNTER (OUTPATIENT)
Facility: HOSPITAL | Age: 62
Discharge: HOME | End: 2022-06-07
Attending: INTERNAL MEDICINE | Admitting: INTERNAL MEDICINE
Payer: OTHER GOVERNMENT

## 2022-06-07 VITALS
SYSTOLIC BLOOD PRESSURE: 151 MMHG | OXYGEN SATURATION: 96 % | RESPIRATION RATE: 18 BRPM | DIASTOLIC BLOOD PRESSURE: 81 MMHG | BODY MASS INDEX: 28.68 KG/M2 | HEIGHT: 64 IN | HEART RATE: 43 BPM | TEMPERATURE: 98 F | WEIGHT: 168 LBS

## 2022-06-07 DIAGNOSIS — I48.3 TYPICAL ATRIAL FLUTTER: ICD-10-CM

## 2022-06-07 DIAGNOSIS — Z86.79 S/P ABLATION OF ATRIAL FLUTTER: ICD-10-CM

## 2022-06-07 DIAGNOSIS — Z98.890 S/P ABLATION OF ATRIAL FLUTTER: ICD-10-CM

## 2022-06-07 DIAGNOSIS — I48.91 ATRIAL FIBRILLATION: ICD-10-CM

## 2022-06-07 DIAGNOSIS — I49.9 ARRHYTHMIA: ICD-10-CM

## 2022-06-07 LAB
APTT BLDCRRT: 24.4 SEC (ref 21–32)
INR PPP: 1 (ref 0.8–1.2)
PROTHROMBIN TIME: 10.3 SEC (ref 9–12.5)

## 2022-06-07 PROCEDURE — C1732 CATH, EP, DIAG/ABL, 3D/VECT: HCPCS | Performed by: INTERNAL MEDICINE

## 2022-06-07 PROCEDURE — 93010 ELECTROCARDIOGRAM REPORT: CPT | Mod: 76,,, | Performed by: INTERNAL MEDICINE

## 2022-06-07 PROCEDURE — 93653 COMPRE EP EVAL TX SVT: CPT | Performed by: INTERNAL MEDICINE

## 2022-06-07 PROCEDURE — 37000009 HC ANESTHESIA EA ADD 15 MINS: Performed by: INTERNAL MEDICINE

## 2022-06-07 PROCEDURE — 63600175 PHARM REV CODE 636 W HCPCS: Performed by: NURSE ANESTHETIST, CERTIFIED REGISTERED

## 2022-06-07 PROCEDURE — 93010 EKG 12-LEAD: ICD-10-PCS | Mod: 76,,, | Performed by: INTERNAL MEDICINE

## 2022-06-07 PROCEDURE — 25000003 PHARM REV CODE 250: Performed by: INTERNAL MEDICINE

## 2022-06-07 PROCEDURE — 93005 ELECTROCARDIOGRAM TRACING: CPT | Mod: 59

## 2022-06-07 PROCEDURE — 93653 COMPRE EP EVAL TX SVT: CPT | Mod: ,,, | Performed by: INTERNAL MEDICINE

## 2022-06-07 PROCEDURE — 93653 PR ELECTROPHYS EVAL, COMPREHEN, W/SUPRAVENT TACHYCARD TRMT: ICD-10-PCS | Mod: ,,, | Performed by: INTERNAL MEDICINE

## 2022-06-07 PROCEDURE — D9220A PRA ANESTHESIA: ICD-10-PCS | Mod: ANES,,, | Performed by: ANESTHESIOLOGY

## 2022-06-07 PROCEDURE — D9220A PRA ANESTHESIA: ICD-10-PCS | Mod: CRNA,,, | Performed by: NURSE ANESTHETIST, CERTIFIED REGISTERED

## 2022-06-07 PROCEDURE — 00537 ANES CARDIAC EP PROCEDURES: CPT | Performed by: INTERNAL MEDICINE

## 2022-06-07 PROCEDURE — D9220A PRA ANESTHESIA: Mod: ANES,,, | Performed by: ANESTHESIOLOGY

## 2022-06-07 PROCEDURE — 93005 ELECTROCARDIOGRAM TRACING: CPT

## 2022-06-07 PROCEDURE — 27201423 OPTIME MED/SURG SUP & DEVICES STERILE SUPPLY: Performed by: INTERNAL MEDICINE

## 2022-06-07 PROCEDURE — 85610 PROTHROMBIN TIME: CPT | Performed by: INTERNAL MEDICINE

## 2022-06-07 PROCEDURE — 93010 ELECTROCARDIOGRAM REPORT: CPT | Mod: ,,, | Performed by: INTERNAL MEDICINE

## 2022-06-07 PROCEDURE — C1894 INTRO/SHEATH, NON-LASER: HCPCS | Performed by: INTERNAL MEDICINE

## 2022-06-07 PROCEDURE — D9220A PRA ANESTHESIA: Mod: CRNA,,, | Performed by: NURSE ANESTHETIST, CERTIFIED REGISTERED

## 2022-06-07 PROCEDURE — 63600175 PHARM REV CODE 636 W HCPCS: Performed by: INTERNAL MEDICINE

## 2022-06-07 PROCEDURE — 25000003 PHARM REV CODE 250: Performed by: NURSE ANESTHETIST, CERTIFIED REGISTERED

## 2022-06-07 PROCEDURE — 85730 THROMBOPLASTIN TIME PARTIAL: CPT | Performed by: INTERNAL MEDICINE

## 2022-06-07 PROCEDURE — 37000008 HC ANESTHESIA 1ST 15 MINUTES: Performed by: INTERNAL MEDICINE

## 2022-06-07 PROCEDURE — C1730 CATH, EP, 19 OR FEW ELECT: HCPCS | Performed by: INTERNAL MEDICINE

## 2022-06-07 RX ORDER — ACETAMINOPHEN 325 MG/1
650 TABLET ORAL EVERY 4 HOURS PRN
Status: DISCONTINUED | OUTPATIENT
Start: 2022-06-07 | End: 2022-06-07 | Stop reason: HOSPADM

## 2022-06-07 RX ORDER — HALOPERIDOL 5 MG/ML
0.5 INJECTION INTRAMUSCULAR EVERY 10 MIN PRN
Status: DISCONTINUED | OUTPATIENT
Start: 2022-06-07 | End: 2022-06-07 | Stop reason: HOSPADM

## 2022-06-07 RX ORDER — HEPARIN SOD,PORCINE/0.9 % NACL 1000/500ML
INTRAVENOUS SOLUTION INTRAVENOUS
Status: DISCONTINUED | OUTPATIENT
Start: 2022-06-07 | End: 2022-06-07 | Stop reason: HOSPADM

## 2022-06-07 RX ORDER — LIDOCAINE HYDROCHLORIDE 20 MG/ML
INJECTION INTRAVENOUS
Status: DISCONTINUED | OUTPATIENT
Start: 2022-06-07 | End: 2022-06-07

## 2022-06-07 RX ORDER — KETAMINE HCL IN 0.9 % NACL 50 MG/5 ML
SYRINGE (ML) INTRAVENOUS
Status: DISCONTINUED | OUTPATIENT
Start: 2022-06-07 | End: 2022-06-07

## 2022-06-07 RX ORDER — FENTANYL CITRATE 50 UG/ML
INJECTION, SOLUTION INTRAMUSCULAR; INTRAVENOUS
Status: DISCONTINUED | OUTPATIENT
Start: 2022-06-07 | End: 2022-06-07

## 2022-06-07 RX ORDER — MIDAZOLAM HYDROCHLORIDE 1 MG/ML
INJECTION, SOLUTION INTRAMUSCULAR; INTRAVENOUS
Status: DISCONTINUED | OUTPATIENT
Start: 2022-06-07 | End: 2022-06-07

## 2022-06-07 RX ORDER — PHENYLEPHRINE HYDROCHLORIDE 10 MG/ML
INJECTION INTRAVENOUS
Status: DISCONTINUED | OUTPATIENT
Start: 2022-06-07 | End: 2022-06-07

## 2022-06-07 RX ORDER — SODIUM CHLORIDE 0.9 % (FLUSH) 0.9 %
10 SYRINGE (ML) INJECTION
Status: DISCONTINUED | OUTPATIENT
Start: 2022-06-07 | End: 2022-06-07 | Stop reason: HOSPADM

## 2022-06-07 RX ORDER — FENTANYL CITRATE 50 UG/ML
25 INJECTION, SOLUTION INTRAMUSCULAR; INTRAVENOUS EVERY 5 MIN PRN
Status: DISCONTINUED | OUTPATIENT
Start: 2022-06-07 | End: 2022-06-07 | Stop reason: HOSPADM

## 2022-06-07 RX ORDER — LIDOCAINE HYDROCHLORIDE 20 MG/ML
INJECTION, SOLUTION INFILTRATION; PERINEURAL
Status: DISCONTINUED | OUTPATIENT
Start: 2022-06-07 | End: 2022-06-07 | Stop reason: HOSPADM

## 2022-06-07 RX ORDER — PROPOFOL 10 MG/ML
VIAL (ML) INTRAVENOUS
Status: DISCONTINUED | OUTPATIENT
Start: 2022-06-07 | End: 2022-06-07

## 2022-06-07 RX ADMIN — Medication 10 MG: at 11:06

## 2022-06-07 RX ADMIN — PROPOFOL 125 MCG/KG/MIN: 10 INJECTION, EMULSION INTRAVENOUS at 11:06

## 2022-06-07 RX ADMIN — MIDAZOLAM HYDROCHLORIDE 2 MG: 1 INJECTION, SOLUTION INTRAMUSCULAR; INTRAVENOUS at 11:06

## 2022-06-07 RX ADMIN — PROPOFOL 50 MG: 10 INJECTION, EMULSION INTRAVENOUS at 11:06

## 2022-06-07 RX ADMIN — FENTANYL CITRATE 50 MCG: 0.05 INJECTION, SOLUTION INTRAMUSCULAR; INTRAVENOUS at 11:06

## 2022-06-07 RX ADMIN — LIDOCAINE HYDROCHLORIDE 40 MG: 20 INJECTION, SOLUTION INTRAVENOUS at 11:06

## 2022-06-07 RX ADMIN — PHENYLEPHRINE HYDROCHLORIDE 100 MCG: 10 INJECTION, SOLUTION INTRAMUSCULAR; INTRAVENOUS; SUBCUTANEOUS at 12:06

## 2022-06-07 RX ADMIN — SODIUM CHLORIDE: 0.9 INJECTION, SOLUTION INTRAVENOUS at 11:06

## 2022-06-07 RX ADMIN — PHENYLEPHRINE HYDROCHLORIDE 200 MCG: 10 INJECTION, SOLUTION INTRAMUSCULAR; INTRAVENOUS; SUBCUTANEOUS at 12:06

## 2022-06-07 RX ADMIN — ACETAMINOPHEN 650 MG: 325 TABLET ORAL at 02:06

## 2022-06-07 NOTE — BRIEF OP NOTE
: Olman Martinez M.D    Post-operative Diagnosis: AFlutter    Procedure Performed: Emperic Radiofrequency ablation of the CTI.     Description of Procedure: The patient was brought to the EP lab in the fasting state. Prepped and draped in sterile fashion. Safety timeout was performed. Sedation administered by anesthesia staff. Ultrasound guided venous access of the right femoral vein was performed x3. HALO, CS, and ablation catheters placed. RFA to the CTI with confirmation of bidirectional block. All catheters and sheaths removed and patient was transferred to recovery in stable condition.     EBL: <10 mL    Specimens Removed: None  Complications: no immediate      Ulisses Blanton  EP Fellow

## 2022-06-07 NOTE — TRANSFER OF CARE
"Anesthesia Transfer of Care Note    Patient: Patrice Smith    Procedure(s) Performed: Procedure(s) (LRB):  ABLATION, ATRIAL FLUTTER, TYPICAL (N/A)    Patient location: PACU    Anesthesia Type: general    Transport from OR: Transported from OR on 6-10 L/min O2 by face mask with adequate spontaneous ventilation    Post pain: adequate analgesia    Post assessment: no apparent anesthetic complications    Post vital signs: stable    Level of consciousness: responds to stimulation    Nausea/Vomiting: no nausea/vomiting    Complications: none    Transfer of care protocol was followed      Last vitals:   Visit Vitals  /69 (BP Location: Right arm, Patient Position: Lying)   Pulse (!) 52   Temp 36.4 °C (97.5 °F) (Temporal)   Resp 18   Ht 5' 4" (1.626 m)   Wt 76.2 kg (168 lb)   SpO2 100%   BMI 28.84 kg/m²     "

## 2022-06-07 NOTE — TELEPHONE ENCOUNTER
6/6/2022: Patient noted to have a history of a terminal esophageal web diagnosed in 2017 in his medical history. Unable to find outside vs [facility] EGD report. Dr. Marco Geller notified of this finding and stated that he would discuss with Dr. Martinez

## 2022-06-07 NOTE — HOSPITAL COURSE
Patrice Smith is a 62 year old male with  HTN, borderline HLD, BRICE on CPAP, whose history of atrial flutter dates back to 12/2020 when he presented to [facility] with typical flutter with RVR. He was rate controlled, spontaneously converted to sinus rhythm, and was discharged. He had recurrent symptomatic atrial flutter in 3/2022 following a stressful day that spontaneously resolved. He was seen in clinic and opted for catheter ablation.  Echo in 12/2020 showed a structurally normal heart, normal EF. An exercise SPECT done in 12/2020 was negative for ischemia. He is clinically stable today. Procedure for CTI ablation was explained to him in detail and he expressed understanding and consented to the planned procedure. He is accompanied by his wife. He is not on oral anticoagulation.       After consenting to the procedure, patient was brought to the lab in the EP lab. He had empiric CTI ablation to treat typical atrial flutter. He tolerated the procedure well and was discharged home following bedrest in good condition.

## 2022-06-07 NOTE — DISCHARGE SUMMARY
[facility] - Short Stay Cardiac Unit  Cardiac Electrophysiology  Discharge Summary      Patient Name: Patrice Smith  MRN: [id number]  Admission Date: 6/7/2022  Hospital Length of Stay: 0 days  Discharge Date and Time:  06/07/2022 4:41 PM  Attending Physician: Olman Martinez MD    Discharging Provider: Ulisses Blanton MD  Primary Care Physician: Ant Gallo MD    HPI:   No notes on file    Procedure(s) (LRB):  ABLATION, ATRIAL FLUTTER, TYPICAL (N/A)     Indwelling Lines/Drains at time of discharge:  Lines/Drains/Airways     None                 Hospital Course:  Patrice Smith is a 62 year old male with  HTN, borderline HLD, BRICE on CPAP, whose history of atrial flutter dates back to 12/2020 when he presented to [facility] with typical flutter with RVR. He was rate controlled, spontaneously converted to sinus rhythm, and was discharged. He had recurrent symptomatic atrial flutter in 3/2022 following a stressful day that spontaneously resolved. He was seen in clinic and opted for catheter ablation.  Echo in 12/2020 showed a structurally normal heart, normal EF. An exercise SPECT done in 12/2020 was negative for ischemia. He is clinically stable today. Procedure for CTI ablation was explained to him in detail and he expressed understanding and consented to the planned procedure. He is accompanied by his wife. He is not on oral anticoagulation.       After consenting to the procedure, patient was brought to the lab in the EP lab. He had empiric CTI ablation to treat typical atrial flutter. He tolerated the procedure well and was discharged home following bedrest in good condition.       Goals of Care Treatment Preferences:  Code Status: Full Code      Lab Results   Component Value Date    WBC 6.76 03/17/2022    HGB 17.2 03/17/2022    HCT 48.1 03/17/2022    MCV 95 03/17/2022     03/17/2022       CMP  Sodium   Date Value Ref Range Status   03/17/2022 139 136 - 145 mmol/L Final     Potassium   Date Value Ref Range  Status   03/17/2022 3.5 3.5 - 5.1 mmol/L Final     Chloride   Date Value Ref Range Status   03/17/2022 105 95 - 110 mmol/L Final     CO2   Date Value Ref Range Status   03/17/2022 24 23 - 29 mmol/L Final     Glucose   Date Value Ref Range Status   03/17/2022 115 (H) 70 - 110 mg/dL Final     BUN   Date Value Ref Range Status   03/17/2022 16 8 - 23 mg/dL Final     Creatinine   Date Value Ref Range Status   03/17/2022 1.1 0.5 - 1.4 mg/dL Final     Calcium   Date Value Ref Range Status   03/17/2022 9.8 8.7 - 10.5 mg/dL Final     Total Protein   Date Value Ref Range Status   03/17/2022 7.5 6.0 - 8.4 g/dL Final     Albumin   Date Value Ref Range Status   03/17/2022 5.0 3.5 - 5.2 g/dL Final     Total Bilirubin   Date Value Ref Range Status   03/17/2022 1.3 (H) 0.1 - 1.0 mg/dL Final     Comment:     For infants and newborns, interpretation of results should be based  on gestational age, weight and in agreement with clinical  observations.    Premature Infant recommended reference ranges:  Up to 24 hours.............<8.0 mg/dL  Up to 48 hours............<12.0 mg/dL  3-5 days..................<15.0 mg/dL  6-29 days.................<15.0 mg/dL       Alkaline Phosphatase   Date Value Ref Range Status   03/17/2022 53 (L) 55 - 135 U/L Final     AST   Date Value Ref Range Status   03/17/2022 28 10 - 40 U/L Final     ALT   Date Value Ref Range Status   03/17/2022 53 (H) 10 - 44 U/L Final     Anion Gap   Date Value Ref Range Status   03/17/2022 10 8 - 16 mmol/L Final     eGFR if    Date Value Ref Range Status   03/17/2022 >60.0 >60 mL/min/1.73 m^2 Final     eGFR if non    Date Value Ref Range Status   03/17/2022 >60.0 >60 mL/min/1.73 m^2 Final     Comment:     Calculation used to obtain the estimated glomerular filtration  rate (eGFR) is the CKD-EPI equation.            Pending Diagnostic Studies:     Procedure Component Value Units Date/Time    Transesophageal echo (LETA) [[id number]]     Order Status:  Sent Lab Status: No result           There are no hospital problems to display for this patient.    No new Assessment & Plan notes have been filed under this hospital service since the last note was generated.  Service: Arrhythmia      Discharged Condition: good    Disposition: Home     Follow Up:   Follow-up Information     Olman Martinez MD Follow up in 3 month(s).    Specialties: Electrophysiology, Cardiovascular Disease, Cardiology  Contact information:  [address]  [facility] [id number]  [phone]                       Patient Instructions:     Resume routine home medications   Do not strain or lift anything greater than 5 lb for 1 week.  Do not drive or operate any dangerous machinery for 24 hours.   Clean the area with mild soap and water and then cover it with a bandage.   Once the skin has healed, bathing in a tub or swimming is allowed.   Inspect the groin site daily and report to the physician any swelling at the site that   cannot be controlled with manual pressure for 10 minutes, unusual pain at the   access site or affected extremity, unusual swelling at the access site, or signs or   symptoms of infection such as redness, pain, or fever.   Call 911 if you have:   Bleeding from the puncture site that you cannot stop by doing the following:   Relax and lie down right away. Keep your leg flat and apply firm pressure to the site using your fingers and a gauze pad. Keep the pressure on for 20 minutes. Continue this until the bleeding stops. This may take awhile. When bleeding stops, cover the site with a sterile bandage and keep your leg still as much as possible.      Medications:  Reconciled Home Medications:      Medication List      CONTINUE taking these medications    aspirin 81 MG Chew  Take 1 tablet (81 mg total) by mouth once daily.     fluticasone propionate 50 mcg/actuation nasal spray  Commonly known as: FLONASE  USE 1 SPRAY IN EACH NOSTRIL TWICE A DAY AS NEEDED     irbesartan 300 MG  tablet  Commonly known as: AVAPRO  Take 1 tablet (300 mg total) by mouth every evening.     multivitamin capsule  Take 1 capsule by mouth once daily.     pantoprazole 40 MG tablet  Commonly known as: PROTONIX  TAKE 1 TABLET DAILY     rosuvastatin 10 MG tablet  Commonly known as: CRESTOR  Take 1 tablet (10 mg total) by mouth every evening.     tadalafiL 20 MG Tab  Commonly known as: CIALIS  Take 1 tablet (20 mg total) by mouth twice a week. As needed     vitamin A-vit C-vit E-zinc-Cu Tab  Take by mouth once daily.        ASK your doctor about these medications    SHINGRIX (PF) 50 mcg/0.5 mL injection  Generic drug: varicella-zoster gE-AS01B (PF)            Time spent on the discharge of patient: 20 minutes    Ulisses Blanton MD  Cardiac Electrophysiology  [facility] - Short Stay Cardiac Unit

## 2022-06-07 NOTE — DISCHARGE INSTRUCTIONS
Resume routine home medications   Do not strain or lift anything greater than 5 lb for 1 week.  Do not drive or operate any dangerous machinery for 24 hours.   Clean the area with mild soap and water and then cover it with a bandage.   Once the skin has healed, bathing in a tub or swimming is allowed.   Inspect the groin site daily and report to the physician any swelling at the site that   cannot be controlled with manual pressure for 10 minutes, unusual pain at the   access site or affected extremity, unusual swelling at the access site, or signs or   symptoms of infection such as redness, pain, or fever.   Call 911 if you have:   Bleeding from the puncture site that you cannot stop by doing the following:   Relax and lie down right away. Keep your leg flat and apply firm pressure to the site using your fingers and a gauze pad. Keep the pressure on for 20 minutes. Continue this until the bleeding stops. This may take awhile. When bleeding stops, cover the site with a sterile bandage and keep your leg still as much as possible.

## 2022-06-07 NOTE — NURSING TRANSFER
Nursing Transfer Note      6/7/2022     Reason patient is being transferred: D/C CRITERIA MET    Transfer To: SSCU 2    Transfer via stretcher    Transfer with cardiac monitoring    Transported by D/C CRITERIA MET     Medicines sent: NONE    Any special needs or follow-up needed: GROIN CHECKS     Chart send with patient: Yes    Notified: REPORTED TO PRITESH SILVERIO     Patient reassessed at: SEE Epic  (date, time)    Upon arrival to floor: TO ROOM NO COMPLAINTS NO DISTRESS NOTED.

## 2022-06-07 NOTE — PLAN OF CARE
Patient arrived to room. PIV placed, labs sent. Admit assessment completed. Plan of care discussed with patient. Will monitor. Call light within reach, instructed in use.

## 2022-06-07 NOTE — PROGRESS NOTES
Patient has ambulated and voided; right groin site is dry and intact; discharge instructions given to patient and wife; patient is ready for discharge

## 2022-06-07 NOTE — PLAN OF CARE
Received report from NUSRAT Pierce. Patient s/p ablation, AAOx3. VSS, no c/o pain or discomfort at this time, resp even and unlabored. Gauze/tegaderm dressing to R groin is CDI. No active bleeding. No hematoma noted. Post procedure protocol reviewed with patient and patient's family. Understanding verbalized. Family members at bedside. Nurse call bell within reach. Will continue to monitor per post procedure protocol.

## 2022-06-07 NOTE — PROGRESS NOTES
PATIENT ADMITTED TO RECOVERY SEE [facility] FOR COMPLETE ASSESSMENT PACU [facility]'S MAINTAINED SAFETY MEASURES VERIFIED PATIENT INSTRUCTED ON PAIN SCALE AND PATIENT VERBALIZED UNDERSTANDING. CALLED FOR EKG AND IT WAS DONE AND IN CHART. CALLED PATIENT'S WIFE AND UPDATED ON PATIENT LOCATION WITH THE PERMISSION OF PATIENT.

## 2022-06-07 NOTE — ANESTHESIA PREPROCEDURE EVALUATION
06/07/2022  Patrice Smith is a 62 y.o., male.    Pre-operative evaluation for Procedure(s) (LRB):  ABLATION, ATRIAL FLUTTER, TYPICAL (N/A)    Patrice Smith is a 62 y.o. male     Patient Active Problem List   Diagnosis    High thyroid stimulating hormone (TSH) level    Gastroesophageal reflux disease without esophagitis    Hypertension    Multiple-type hyperlipidemia    Obstructive sleep apnea syndrome    Onychomycosis of toenail    Terminal esophageal web    Perennial allergic rhinitis with seasonal variation    Family history of prostate cancer    Other male erectile dysfunction    Overweight    IGT (impaired glucose tolerance)    Immunization due    Palpitation    Essential hypertension    Atrial flutter       Review of patient's allergies indicates:  No Known Allergies    No current facility-administered medications on file prior to encounter.     Current Outpatient Medications on File Prior to Encounter   Medication Sig Dispense Refill    aspirin 81 MG Chew Take 1 tablet (81 mg total) by mouth once daily.  0    fluticasone propionate (FLONASE) 50 mcg/actuation nasal spray USE 1 SPRAY IN EACH NOSTRIL TWICE A DAY AS NEEDED 48 g 11    irbesartan (AVAPRO) 300 MG tablet Take 1 tablet (300 mg total) by mouth every evening. 90 tablet 3    multivitamin capsule Take 1 capsule by mouth once daily.      pantoprazole (PROTONIX) 40 MG tablet TAKE 1 TABLET DAILY 90 tablet 3    tadalafil (CIALIS) 20 MG Tab Take 1 tablet (20 mg total) by mouth twice a week. As needed 30 tablet 3    vitamin A-vit C-vit E-zinc-Cu Tab Take by mouth once daily.       varicella-zoster gE-AS01B, PF, (SHINGRIX, PF,) 50 mcg/0.5 mL injection          History reviewed. No pertinent surgical history.    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    Tobacco Use    Smoking status: Never Smoker    Smokeless tobacco: Never Used   Substance and Sexual Activity    Alcohol use: Yes     Comment: social    Drug use: No    Sexual activity: Not Currently     Partners: Female     Social Determinants of Health     Financial Resource Strain: Low Risk     Difficulty of Paying Living Expenses: Not hard at all   Food Insecurity: No Food Insecurity    Worried About Running Out of Food in the Last Year: Never true    Ran Out of Food in the Last Year: Never true   Transportation Needs: No Transportation Needs    Lack of Transportation (Medical): No    Lack of Transportation (Non-Medical): No   Physical Activity: Sufficiently Active    Days of Exercise per Week: 5 days    Minutes of Exercise per Session: 30 min   Stress: No Stress Concern Present    Feeling of Stress : Not at all   Social Connections: Unknown    Frequency of Communication with Friends and Family: Once a week    Frequency of Social Gatherings with Friends and Family: Twice a week    Active Member of Clubs or Organizations: No    Attends Club or Organization Meetings: Never    Marital Status:    Housing Stability: Low Risk     Unable to Pay for Housing in the Last Year: No    Number of Places Lived in the Last Year: 1    Unstable Housing in the Last Year: No         CBC: No results for input(s): WBC, RBC, HGB, HCT, PLT, MCV, MCH, MCHC in the last 72 hours.    CMP: No results for input(s): NA, K, CL, CO2, BUN, CREATININE, GLU, MG, PHOS, CALCIUM, ALBUMIN, PROT, ALKPHOS, ALT, AST, BILITOT in the last 72 hours.    INR  Recent Labs     22  0817   INR 1.0   APTT 24.4           Diagnostic Studies:      EKD Echo:  No results found for this or any previous visit.        Pre-op Assessment    I have reviewed the Patient Summary Reports.    I have reviewed the NPO Status.   I have reviewed the Medications.     Review of Systems  Anesthesia Hx:  No problems with previous  Anesthesia   Denies Personal Hx of Anesthesia complications.   Cardiovascular:   Exercise tolerance: good Hypertension Dysrhythmias    Pulmonary:   Sleep Apnea, CPAP    Renal/:  Renal/ Normal     Hepatic/GI:   GERD, well controlled    Neurological:   Denies CVA. Denies Seizures.        Physical Exam  General: Cooperative, Alert and Oriented    Airway:  Mallampati: III   Mouth Opening: Normal  TM Distance: Normal  Tongue: Large  Neck ROM: Normal ROM    Dental:  Intact        Anesthesia Plan  Type of Anesthesia, risks & benefits discussed:    Anesthesia Type: Gen Natural Airway  Intra-op Monitoring Plan: Standard ASA Monitors  Post Op Pain Control Plan: multimodal analgesia and IV/PO Opioids PRN  Induction:  IV  Informed Consent: Informed consent signed with the Patient and all parties understand the risks and agree with anesthesia plan.  All questions answered.   ASA Score: 2  Day of Surgery Review of History & Physical: H&P Update referred to the surgeon/provider.    Ready For Surgery From Anesthesia Perspective.     .

## 2022-06-07 NOTE — PROGRESS NOTES
DR. GILLILAND HERE LET HIM KNOW PATIENT HR DROPS TO 33 AND NO SYMPTOMS NOTED PATIENT STATES HIS HR DOES RUN LOW. NO DISTRESS NO COMPLAINTS NOTED.

## 2022-06-08 NOTE — ANESTHESIA POSTPROCEDURE EVALUATION
Anesthesia Post Evaluation    Patient: Patrice Smith    Procedure(s) Performed: Procedure(s) (LRB):  ABLATION, ATRIAL FLUTTER, TYPICAL (N/A)    Final Anesthesia Type: general      Patient location during evaluation: PACU  Patient participation: Yes- Able to Participate  Level of consciousness: awake and alert  Post-procedure vital signs: reviewed and stable  Pain management: adequate  Airway patency: patent    PONV status at discharge: No PONV  Anesthetic complications: no      Cardiovascular status: stable  Respiratory status: spontaneous ventilation  Hydration status: euvolemic  Follow-up not needed.          Vitals Value Taken Time   /81 06/07/22 1735   Temp 36.7 °C (98 °F) 06/07/22 1500   Pulse 43 06/07/22 1735   Resp 18 06/07/22 1735   SpO2 98 % 06/07/22 1629   Vitals shown include unvalidated device data.      Event Time   Out of Recovery 14:43:00         Pain/[facility]cy Score: Pain Rating Prior to Med Admin: 3 (6/7/2022  2:08 PM)  [facility]cy Score: 9 (6/7/2022  5:30 PM)

## 2022-06-09 ENCOUNTER — TELEPHONE (OUTPATIENT)
Dept: CARDIOLOGY | Facility: CLINIC | Age: 62
End: 2022-06-09
Payer: OTHER GOVERNMENT

## 2022-06-09 NOTE — TELEPHONE ENCOUNTER
----- Message from Obed Avila MA sent at 6/9/2022 10:47 AM CDT -----  Contact: ARIANA KAMINSKI [[id number]]  Type: Needs Medical Advice    Who Called:ARIANA KAMINSKI [[id number]]  Best Call Back Number: [phone]  Inquiry/Question: Would you kindly call ARIANA KAMINSKI [[id number]] regarding rescheduling upcoming appt  Thank you~

## 2022-06-16 ENCOUNTER — PATIENT MESSAGE (OUTPATIENT)
Dept: CARDIOLOGY | Facility: CLINIC | Age: 62
End: 2022-06-16

## 2022-06-16 ENCOUNTER — OFFICE VISIT (OUTPATIENT)
Dept: CARDIOLOGY | Facility: CLINIC | Age: 62
End: 2022-06-16
Payer: OTHER GOVERNMENT

## 2022-06-16 ENCOUNTER — TELEPHONE (OUTPATIENT)
Dept: ELECTROPHYSIOLOGY | Facility: CLINIC | Age: 62
End: 2022-06-16
Payer: OTHER GOVERNMENT

## 2022-06-16 VITALS
OXYGEN SATURATION: 99 % | DIASTOLIC BLOOD PRESSURE: 72 MMHG | HEART RATE: 76 BPM | BODY MASS INDEX: 28.78 KG/M2 | SYSTOLIC BLOOD PRESSURE: 122 MMHG | HEIGHT: 64 IN | WEIGHT: 168.56 LBS

## 2022-06-16 DIAGNOSIS — I48.92 ATRIAL FLUTTER, UNSPECIFIED TYPE: Primary | ICD-10-CM

## 2022-06-16 DIAGNOSIS — S30.1XXA GROIN HEMATOMA, INITIAL ENCOUNTER: ICD-10-CM

## 2022-06-16 DIAGNOSIS — I10 PRIMARY HYPERTENSION: ICD-10-CM

## 2022-06-16 DIAGNOSIS — I10 ESSENTIAL HYPERTENSION: ICD-10-CM

## 2022-06-16 PROCEDURE — 99214 OFFICE O/P EST MOD 30 MIN: CPT | Mod: S$GLB,,, | Performed by: SPECIALIST

## 2022-06-16 PROCEDURE — 99214 PR OFFICE/OUTPT VISIT, EST, LEVL IV, 30-39 MIN: ICD-10-PCS | Mod: S$GLB,,, | Performed by: SPECIALIST

## 2022-06-16 NOTE — PROGRESS NOTES
Subjective:    Patient ID:  Patrice Smith is a 62 y.o. male who presents for   Hospital Follow Up (Ablation ) and Atrial Fibrillation    HPIdoing well 1 week ago  Now kalia smith  Not on  BB due  Heart rate   He will be traveling    status post atrial flutter ablation and is feeling well and is not on beta-blockers or calcium channel blockers due to heart rate.     He is planning to take a 4-5 month  Trip   his LDL is at goal  Review of patient's allergies indicates:  No Known Allergies    Past Medical History:   Diagnosis Date    Acid reflux     Atrial flutter     Hyperlipidemia     Hypertension     Perennial allergic rhinitis with seasonal variation      History reviewed. No pertinent surgical history.  Social History     Tobacco Use    Smoking status: Never Smoker    Smokeless tobacco: Never Used   Substance Use Topics    Alcohol use: Yes     Comment: social    Drug use: No        Review of Systems     ROS        Objective:        Vitals:    06/16/22 1320   BP: 122/72   Pulse: 76       Lab Results   Component Value Date    WBC 6.76 03/17/2022    HGB 17.2 03/17/2022    HCT 48.1 03/17/2022     03/17/2022    CHOL 120 02/25/2022    TRIG 106 02/25/2022    HDL 53 02/25/2022    ALT 53 (H) 03/17/2022    AST 28 03/17/2022     03/17/2022    K 3.5 03/17/2022     03/17/2022    CREATININE 1.1 03/17/2022    BUN 16 03/17/2022    CO2 24 03/17/2022    TSH 3.490 03/17/2022    PSA 1.6 02/06/2014    INR 1.0 06/07/2022    HGBA1C 5.8 (H) 02/25/2022    MICROALBUR <3.0 04/02/2019        ECHOCARDIOGRAM RESULTS  Results for orders placed during the hospital encounter of 12/15/20    Echo Color Flow Doppler? Yes    Interpretation Summary  · Normal central venous pressure (3 mmHg).  · The estimated PA systolic pressure is 25 mmHg.  · The left ventricle is normal in size with hyperdynamic systolic function. There is left ventricular concentric remodeling. The estimated ejection fraction is 80%.  · Normal left  ventricular diastolic function.  · Normal right ventricular size with normal right ventricular systolic function.      CURRENT/PREVIOUS VISIT EKG  Results for orders placed or performed during the hospital encounter of 06/07/22   EKG 12-lead    Collection Time: 06/07/22  1:43 PM    Narrative    Test Reason : Z98.890,Z86.79,    Vent. Rate : 052 BPM     Atrial Rate : 052 BPM     P-R Int : 188 ms          QRS Dur : 098 ms      QT Int : 440 ms       P-R-T Axes : 116 135 162 degrees     QTc Int : 409 ms     Suspect arm lead reversal, interpretation assumes no reversal  Sinus bradycardia with sinus arrhythmia  Right axis deviation  Nonspecific ST and T wave abnormality  Possible Lateral MI vs lead reversal  Abnormal ECG  When compared with ECG of 07-JUN-2022 07:57,  The axis Shifted right  Confirmed by Duane PATHAK MD ([id number]) on 6/7/2022 2:57:50 PM    Referred By: WOJCIECH FRAGOSO           Confirmed By:Duane PATHAK MD     Results for orders placed during the hospital encounter of 12/15/20    Echo Color Flow Doppler? Yes    Interpretation Summary  · Normal central venous pressure (3 mmHg).  · The estimated PA systolic pressure is 25 mmHg.  · The left ventricle is normal in size with hyperdynamic systolic function. There is left ventricular concentric remodeling. The estimated ejection fraction is 80%.  · Normal left ventricular diastolic function.  · Normal right ventricular size with normal right ventricular systolic function.    Results for orders placed during the hospital encounter of 12/15/20    Treadmill Stress Test    Interpretation Summary    The EKG portion of this study is negative for ischemia.    The patient reported no chest pain during the stress test.    There were no arrhythmias during stress.    ECG Stress Nuclear portion of this study will be reported separately.      PHYSICAL EXAM    Physical Exam r groin  Slight swell no  Pulsation     Medication List with Changes/Refills   Current Medications    ASPIRIN 81  MG CHEW    Take 1 tablet (81 mg total) by mouth once daily.    FLUTICASONE PROPIONATE (FLONASE) 50 MCG/ACTUATION NASAL SPRAY    USE 1 SPRAY IN EACH NOSTRIL TWICE A DAY AS NEEDED    IRBESARTAN (AVAPRO) 300 MG TABLET    Take 1 tablet (300 mg total) by mouth every evening.    MULTIVITAMIN CAPSULE    Take 1 capsule by mouth once daily.    PANTOPRAZOLE (PROTONIX) 40 MG TABLET    TAKE 1 TABLET DAILY    ROSUVASTATIN (CRESTOR) 10 MG TABLET    Take 1 tablet (10 mg total) by mouth every evening.    TADALAFIL (CIALIS) 20 MG TAB    Take 1 tablet (20 mg total) by mouth twice a week. As needed    VITAMIN A-VIT C-VIT E-ZINC-CU TAB    Take by mouth once daily.    Discontinued Medications    VARICELLA-ZOSTER GE-AS01B, PF, (SHINGRIX, PF,) 50 MCG/0.5 ML INJECTION               Assessment:       1. Atrial flutter, unspecified type    2. Essential hypertension    3. Primary hypertension    4. Groin hematoma, initial encounter         Plan:    ultrasound of the right groin make sure there is no pseudoaneurysm although I do not think he has any evidence of it   return to clinic in approximately 6 months    Problem List Items Addressed This Visit        Cardiac/Vascular    Hypertension    Essential hypertension    Atrial flutter - Primary      Other Visit Diagnoses     Groin hematoma, initial encounter        Relevant Orders    US Soft Tissue, Groin Right           No follow-ups on file.

## 2022-06-16 NOTE — TELEPHONE ENCOUNTER
Spoke to patient regarding post op care. Patient denies any complaints. Wound site dry and intact without redness, swelling, hematoma or drainage. Patient denies any fever or pain. He reports a lump that is hard and he cannot determine if larger that a marble.  This lump has been there since discharge, has not changed in size.  Instructed on using warm compresses or heating pad to soften.  Report to clinic any signs of infection.    Patient  has resumed medications taking ASA daily, Protonix daily and his other medications.   Discussed blanking period with patient , when to call the MD or go to the ER.   He denies any symptoms of CP, SOB or aflutter.    Patient verbalizes understanding of all post op instructions.

## 2022-06-21 ENCOUNTER — PATIENT MESSAGE (OUTPATIENT)
Dept: UROLOGY | Facility: CLINIC | Age: 62
End: 2022-06-21
Payer: OTHER GOVERNMENT

## 2022-06-21 ENCOUNTER — OFFICE VISIT (OUTPATIENT)
Dept: FAMILY MEDICINE | Facility: CLINIC | Age: 62
End: 2022-06-21
Payer: OTHER GOVERNMENT

## 2022-06-21 ENCOUNTER — LAB VISIT (OUTPATIENT)
Dept: LAB | Facility: HOSPITAL | Age: 62
End: 2022-06-21
Attending: FAMILY MEDICINE
Payer: OTHER GOVERNMENT

## 2022-06-21 VITALS
DIASTOLIC BLOOD PRESSURE: 82 MMHG | HEART RATE: 83 BPM | BODY MASS INDEX: 28.36 KG/M2 | TEMPERATURE: 98 F | SYSTOLIC BLOOD PRESSURE: 150 MMHG | RESPIRATION RATE: 16 BRPM | WEIGHT: 165.19 LBS | OXYGEN SATURATION: 98 %

## 2022-06-21 DIAGNOSIS — F41.9 ANXIETY: Primary | ICD-10-CM

## 2022-06-21 DIAGNOSIS — N52.8 OTHER MALE ERECTILE DYSFUNCTION: ICD-10-CM

## 2022-06-21 DIAGNOSIS — K11.7 XEROSTOMIA: ICD-10-CM

## 2022-06-21 LAB — CRP SERPL-MCNC: <0.02 MG/DL

## 2022-06-21 PROCEDURE — 86140 C-REACTIVE PROTEIN: CPT | Performed by: FAMILY MEDICINE

## 2022-06-21 PROCEDURE — 86038 ANTINUCLEAR ANTIBODIES: CPT | Performed by: FAMILY MEDICINE

## 2022-06-21 PROCEDURE — 99214 PR OFFICE/OUTPT VISIT, EST, LEVL IV, 30-39 MIN: ICD-10-PCS | Mod: S$PBB,AQ,, | Performed by: FAMILY MEDICINE

## 2022-06-21 PROCEDURE — 99213 OFFICE O/P EST LOW 20 MIN: CPT | Performed by: FAMILY MEDICINE

## 2022-06-21 PROCEDURE — 99214 OFFICE O/P EST MOD 30 MIN: CPT | Mod: S$PBB,AQ,, | Performed by: FAMILY MEDICINE

## 2022-06-21 PROCEDURE — 86431 RHEUMATOID FACTOR QUANT: CPT | Performed by: FAMILY MEDICINE

## 2022-06-21 RX ORDER — SERTRALINE HYDROCHLORIDE 50 MG/1
50 TABLET, FILM COATED ORAL DAILY
Qty: 30 TABLET | Refills: 11 | Status: SHIPPED | OUTPATIENT
Start: 2022-06-21 | End: 2023-06-21

## 2022-06-21 RX ORDER — HYOSCYAMINE SULFATE 0.125 MG
125 TABLET ORAL EVERY 4 HOURS PRN
Qty: 60 TABLET | Refills: 3 | Status: SHIPPED | OUTPATIENT
Start: 2022-06-21 | End: 2022-07-21

## 2022-06-21 NOTE — PROGRESS NOTES
Subjective:       Patient ID: Patrice Smith is a 62 y.o. male.    Chief Complaint: dry mouth (For the last month/), Chest Pain (Having rib pain, during his current stressful time. ), and Anxiety      Patient is here because of 1 month history of dry mouth and postnasal drip denies fever.  Patient is status post atrial ablation for atrial flutter. Dr Weston.  He did have a hematoma in the right groin secondary to his procedure June 7. Has no cough but only clears throat occasionally.  Uses CPAP at night with high humidity.  Has had a lot of physical activity recently moving furniture out of the house he has sold.       Chest Pain   This is a chronic problem. The onset quality is sudden. The problem occurs constantly. The pain is present in the epigastric region.   Anxiety  Symptoms include chest pain.           Allergies and Medications:   Review of patient's allergies indicates:  No Known Allergies  Current Outpatient Medications   Medication Sig Dispense Refill    fluticasone propionate (FLONASE) 50 mcg/actuation nasal spray USE 1 SPRAY IN EACH NOSTRIL TWICE A DAY AS NEEDED 48 g 11    irbesartan (AVAPRO) 300 MG tablet Take 1 tablet (300 mg total) by mouth every evening. 90 tablet 3    multivitamin capsule Take 1 capsule by mouth once daily.      pantoprazole (PROTONIX) 40 MG tablet TAKE 1 TABLET DAILY 90 tablet 3    rosuvastatin (CRESTOR) 10 MG tablet Take 1 tablet (10 mg total) by mouth every evening. 90 tablet 1    tadalafil (CIALIS) 20 MG Tab Take 1 tablet (20 mg total) by mouth twice a week. As needed 30 tablet 3    vitamin A-vit C-vit E-zinc-Cu Tab Take by mouth once daily.       aspirin 81 MG Chew Take 1 tablet (81 mg total) by mouth once daily.  0    hyoscyamine (ANASPAZ,LEVSIN) 0.125 mg Tab Take 1 tablet (125 mcg total) by mouth every 4 (four) hours as needed (esophogeal spasm). 60 tablet 3    sertraline (ZOLOFT) 50 MG tablet Take 1 tablet (50 mg total) by mouth once daily. 30 tablet 11     No  current facility-administered medications for this visit.       Family History:   Family History   Problem Relation Age of Onset    Parkinsonism Mother     Hypertension Mother     Ulcers Father     Arthritis Father     Diabetes Father     Hypertension Brother     Cancer Brother     Diabetes Brother     Anesthesia problems Neg Hx     Clotting disorder Neg Hx        Social History:   Social History     Socioeconomic History    Marital status:     Number of children: 3   Occupational History    Occupation: [employer] M[employer].    Tobacco Use    Smoking status: Never Smoker    Smokeless tobacco: Never Used   Substance and Sexual Activity    Alcohol use: Yes     Comment: social    Drug use: No    Sexual activity: Not Currently     Partners: Female     Social Determinants of Health     Financial Resource Strain: Low Risk     Difficulty of Paying Living Expenses: Not hard at all   Food Insecurity: No Food Insecurity    Worried About Running Out of Food in the Last Year: Never true    Ran Out of Food in the Last Year: Never true   Transportation Needs: No Transportation Needs    Lack of Transportation (Medical): No    Lack of Transportation (Non-Medical): No   Physical Activity: Sufficiently Active    Days of Exercise per Week: 5 days    Minutes of Exercise per Session: 40 min   Stress: No Stress Concern Present    Feeling of Stress : Only a little   Social Connections: Unknown    Frequency of Communication with Friends and Family: More than three times a week    Frequency of Social Gatherings with Friends and Family: More than three times a week    Active Member of Clubs or Organizations: Yes    Attends Club or Organization Meetings: More than 4 times per year    Marital Status:    Housing Stability: Low Risk     Unable to Pay for Housing in the Last Year: No    Number of Places Lived in the Last Year: 1    Unstable Housing in the Last Year: No       Review of Systems    Cardiovascular: Positive for chest pain.       Objective:     Vitals:    06/21/22 0809   BP: (!) 145/76   Pulse: 83   Resp: 16   Temp: 97.8 °F (36.6 °C)        Physical Exam  Vitals and nursing note reviewed.   Constitutional:       General: He is not in acute distress.     Appearance: Normal appearance. He is well-developed and normal weight. He is not ill-appearing, toxic-appearing or diaphoretic.   HENT:      Head: Normocephalic and atraumatic.      Right Ear: Hearing, tympanic membrane, ear canal and external ear normal. No decreased hearing noted. No drainage, swelling or tenderness. No middle ear effusion. There is no impacted cerumen. No foreign body. No hemotympanum. Tympanic membrane is not injected, scarred, perforated, erythematous, retracted or bulging. Tympanic membrane has normal mobility.      Left Ear: Hearing, tympanic membrane, ear canal and external ear normal. No decreased hearing noted. No drainage, swelling or tenderness.  No middle ear effusion. There is no impacted cerumen. No foreign body. No hemotympanum. Tympanic membrane is not injected, scarred, perforated, erythematous, retracted or bulging. Tympanic membrane has normal mobility.      Nose: Nose normal. No nasal deformity, septal deviation, laceration, mucosal edema, congestion or rhinorrhea.      Right Sinus: No maxillary sinus tenderness or frontal sinus tenderness.      Left Sinus: No maxillary sinus tenderness or frontal sinus tenderness.      Mouth/Throat:      Dentition: Normal dentition.      Pharynx: Uvula midline. No oropharyngeal exudate or posterior oropharyngeal erythema.      Tonsils: No tonsillar abscesses.   Eyes:      General: No scleral icterus.        Right eye: No discharge.         Left eye: No discharge.      Conjunctiva/sclera: Conjunctivae normal.      Pupils: Pupils are equal, round, and reactive to light.   Neck:      Thyroid: No thyromegaly.      Vascular: No carotid bruit.   Cardiovascular:      Rate and  Rhythm: Normal rate and regular rhythm.      Heart sounds: Normal heart sounds. No murmur heard.    No friction rub. No gallop.   Pulmonary:      Effort: Pulmonary effort is normal. No respiratory distress.      Breath sounds: Normal breath sounds. No stridor. No wheezing, rhonchi or rales.   Chest:      Chest wall: No tenderness.   Abdominal:      General: Abdomen is flat. Bowel sounds are normal.      Palpations: Abdomen is soft.   Musculoskeletal:      Cervical back: Normal range of motion and neck supple. No rigidity or tenderness.   Lymphadenopathy:      Cervical: No cervical adenopathy.   Neurological:      Mental Status: He is alert.         Assessment:       1. Anxiety    2. Xerostomia        Plan:       Patrice was seen today for dry mouth, chest pain and anxiety.    Diagnoses and all orders for this visit:    Anxiety  -     hyoscyamine (ANASPAZ,LEVSIN) 0.125 mg Tab; Take 1 tablet (125 mcg total) by mouth every 4 (four) hours as needed (esophogeal spasm).  -     sertraline (ZOLOFT) 50 MG tablet; Take 1 tablet (50 mg total) by mouth once daily.    Xerostomia  -     DAWSON; Future  -     C-reactive protein; Future  -     Rheumatoid factor; Future         Follow up in about 3 months (around 9/21/2022) for follow up dry mouth..

## 2022-06-22 ENCOUNTER — TELEPHONE (OUTPATIENT)
Dept: FAMILY MEDICINE | Facility: CLINIC | Age: 62
End: 2022-06-22

## 2022-06-22 LAB
ANA TITR SER IF: NEGATIVE {TITER}
RHEUMATOID FACT SERPL-ACNC: <10 IU/ML

## 2022-06-22 RX ORDER — TADALAFIL 20 MG/1
20 TABLET ORAL
Qty: 30 TABLET | Refills: 3 | Status: SHIPPED | OUTPATIENT
Start: 2022-06-23

## 2022-06-28 ENCOUNTER — HOSPITAL ENCOUNTER (OUTPATIENT)
Dept: RADIOLOGY | Facility: HOSPITAL | Age: 62
Discharge: HOME | End: 2022-06-28
Attending: SPECIALIST
Payer: OTHER GOVERNMENT

## 2022-06-28 DIAGNOSIS — S30.1XXA GROIN HEMATOMA, INITIAL ENCOUNTER: ICD-10-CM

## 2022-06-28 PROCEDURE — 76882 US LMTD JT/FCL EVL NVASC XTR: CPT | Mod: TC,PO,LT

## 2023-01-11 ENCOUNTER — PATIENT MESSAGE (OUTPATIENT)
Dept: CARDIOLOGY | Facility: CLINIC | Age: 63
End: 2023-01-11
Payer: OTHER GOVERNMENT

## 2024-12-09 NOTE — TELEPHONE ENCOUNTER
No care due was identified.  [facility] Embedded Care Due Messages. Reference number: [id number].   12/09/2024 6:08:47 AM CST   Pharmacy faxed and requested a refill on Irbesartan Tabs 150 mg    Patient was seen in the office on 10/5/2018.

## (undated) DEVICE — SHEATH HEMOSTASIS 8.5FR

## (undated) DEVICE — PAD RADI FEMORAL

## (undated) DEVICE — PATCH CARTO REFERENCE

## (undated) DEVICE — CATH DS NAV THERMOCOUPLE 7FR

## (undated) DEVICE — ELECTRODE REM PLYHSV RETURN 9

## (undated) DEVICE — INTRODUCER HEMOSTASIS 7.5F

## (undated) DEVICE — PAD DEFIB CADENCE ADULT R2

## (undated) DEVICE — PACK EP DRAPE

## (undated) DEVICE — KIT PROBE COVER WITH GEL

## (undated) DEVICE — CATH BIDIRECTIONAL DF CRV 7FR

## (undated) DEVICE — CATH TRICUSPID HALO XP 7FRX110

## (undated) DEVICE — INTRO 8.5FR 63CM SRO